# Patient Record
Sex: FEMALE | Race: WHITE | Employment: OTHER | URBAN - NONMETROPOLITAN AREA
[De-identification: names, ages, dates, MRNs, and addresses within clinical notes are randomized per-mention and may not be internally consistent; named-entity substitution may affect disease eponyms.]

---

## 2017-11-16 ENCOUNTER — HOSPITAL ENCOUNTER (OUTPATIENT)
Dept: MAMMOGRAPHY | Age: 62
Discharge: HOME | End: 2017-11-16
Attending: ORTHOPAEDIC SURGERY | Admitting: ORTHOPAEDIC SURGERY

## 2017-11-16 DIAGNOSIS — Z12.31 VISIT FOR SCREENING MAMMOGRAM: ICD-10-CM

## 2020-09-22 ENCOUNTER — HOSPITAL ENCOUNTER (EMERGENCY)
Age: 65
Discharge: ANOTHER HOSPITAL | End: 2020-09-22
Attending: EMERGENCY MEDICINE
Payer: COMMERCIAL

## 2020-09-22 ENCOUNTER — HOSPITAL ENCOUNTER (INPATIENT)
Age: 65
LOS: 7 days | Discharge: NURSING, REHAB OR LONG-TERM CARE | DRG: 481 | End: 2020-09-29
Attending: INTERNAL MEDICINE | Admitting: INTERNAL MEDICINE
Payer: COMMERCIAL

## 2020-09-22 ENCOUNTER — APPOINTMENT (OUTPATIENT)
Dept: GENERAL RADIOLOGY | Age: 65
End: 2020-09-22
Payer: COMMERCIAL

## 2020-09-22 VITALS
OXYGEN SATURATION: 98 % | HEIGHT: 68 IN | WEIGHT: 209 LBS | BODY MASS INDEX: 31.67 KG/M2 | SYSTOLIC BLOOD PRESSURE: 132 MMHG | HEART RATE: 101 BPM | TEMPERATURE: 98.6 F | DIASTOLIC BLOOD PRESSURE: 57 MMHG | RESPIRATION RATE: 13 BRPM

## 2020-09-22 PROBLEM — S72.111K: Status: ACTIVE | Noted: 2020-09-22

## 2020-09-22 LAB
ABO/RH: NORMAL
ANION GAP SERPL CALCULATED.3IONS-SCNC: 17 MMOL/L (ref 4–16)
ANTIBODY SCREEN: NEGATIVE
BACTERIA: ABNORMAL /HPF
BASOPHILS ABSOLUTE: 0 K/CU MM
BASOPHILS RELATIVE PERCENT: 0.4 % (ref 0–1)
BILIRUBIN URINE: NEGATIVE MG/DL
BLOOD, URINE: ABNORMAL
BUN BLDV-MCNC: 17 MG/DL (ref 6–23)
CALCIUM SERPL-MCNC: 9.1 MG/DL (ref 8.3–10.6)
CAST TYPE: ABNORMAL /HPF
CHLORIDE BLD-SCNC: 98 MMOL/L (ref 99–110)
CLARITY: CLEAR
CO2: 23 MMOL/L (ref 21–32)
COLOR: YELLOW
CREAT SERPL-MCNC: 0.7 MG/DL (ref 0.6–1.1)
CRYSTAL TYPE: NEGATIVE /HPF
DIFFERENTIAL TYPE: ABNORMAL
EKG ATRIAL RATE: 80 BPM
EKG DIAGNOSIS: NORMAL
EKG P AXIS: 53 DEGREES
EKG P-R INTERVAL: 132 MS
EKG Q-T INTERVAL: 398 MS
EKG QRS DURATION: 96 MS
EKG QTC CALCULATION (BAZETT): 459 MS
EKG R AXIS: 28 DEGREES
EKG T AXIS: 23 DEGREES
EKG VENTRICULAR RATE: 80 BPM
EOSINOPHILS ABSOLUTE: 0.1 K/CU MM
EOSINOPHILS RELATIVE PERCENT: 1.5 % (ref 0–3)
EPITHELIAL CELLS, UA: 3 /HPF
GFR AFRICAN AMERICAN: >60 ML/MIN/1.73M2
GFR NON-AFRICAN AMERICAN: >60 ML/MIN/1.73M2
GLUCOSE BLD-MCNC: 281 MG/DL (ref 70–99)
GLUCOSE BLD-MCNC: 306 MG/DL (ref 70–99)
GLUCOSE, URINE: 500 MG/DL
HCT VFR BLD CALC: 41.5 % (ref 37–47)
HEMOGLOBIN: 13.3 GM/DL (ref 12.5–16)
IMMATURE NEUTROPHIL %: 1.7 % (ref 0–0.43)
INR BLD: 0.94 INDEX
KETONES, URINE: 15 MG/DL
LEUKOCYTE ESTERASE, URINE: NEGATIVE
LYMPHOCYTES ABSOLUTE: 2.4 K/CU MM
LYMPHOCYTES RELATIVE PERCENT: 25.2 % (ref 24–44)
MCH RBC QN AUTO: 29.2 PG (ref 27–31)
MCHC RBC AUTO-ENTMCNC: 32 % (ref 32–36)
MCV RBC AUTO: 91 FL (ref 78–100)
MONOCYTES ABSOLUTE: 0.5 K/CU MM
MONOCYTES RELATIVE PERCENT: 5 % (ref 0–4)
NITRITE URINE, QUANTITATIVE: NEGATIVE
PDW BLD-RTO: 13 % (ref 11.7–14.9)
PH, URINE: 5.5 (ref 5–8)
PLATELET # BLD: 269 K/CU MM (ref 140–440)
PMV BLD AUTO: 10.4 FL (ref 7.5–11.1)
POTASSIUM SERPL-SCNC: 3.9 MMOL/L (ref 3.5–5.1)
PROTEIN UA: ABNORMAL MG/DL
PROTHROMBIN TIME: 10.7 SECONDS (ref 11.7–14.5)
RBC # BLD: 4.56 M/CU MM (ref 4.2–5.4)
RBC URINE: 1 /HPF (ref 0–6)
SEGMENTED NEUTROPHILS ABSOLUTE COUNT: 6.4 K/CU MM
SEGMENTED NEUTROPHILS RELATIVE PERCENT: 66.2 % (ref 36–66)
SODIUM BLD-SCNC: 138 MMOL/L (ref 135–145)
SPECIFIC GRAVITY UA: >1.03 (ref 1–1.03)
TOTAL IMMATURE NEUTOROPHIL: 0.16 K/CU MM
UROBILINOGEN, URINE: 0.2 MG/DL (ref 0.2–1)
WBC # BLD: 9.7 K/CU MM (ref 4–10.5)
WBC UA: 5 /HPF (ref 0–5)

## 2020-09-22 PROCEDURE — 6360000002 HC RX W HCPCS: Performed by: HOSPITALIST

## 2020-09-22 PROCEDURE — 6360000002 HC RX W HCPCS: Performed by: EMERGENCY MEDICINE

## 2020-09-22 PROCEDURE — 86850 RBC ANTIBODY SCREEN: CPT

## 2020-09-22 PROCEDURE — 87086 URINE CULTURE/COLONY COUNT: CPT

## 2020-09-22 PROCEDURE — 96374 THER/PROPH/DIAG INJ IV PUSH: CPT

## 2020-09-22 PROCEDURE — 2580000003 HC RX 258: Performed by: NURSE PRACTITIONER

## 2020-09-22 PROCEDURE — 85610 PROTHROMBIN TIME: CPT

## 2020-09-22 PROCEDURE — 86900 BLOOD TYPING SEROLOGIC ABO: CPT

## 2020-09-22 PROCEDURE — 73502 X-RAY EXAM HIP UNI 2-3 VIEWS: CPT

## 2020-09-22 PROCEDURE — 80048 BASIC METABOLIC PNL TOTAL CA: CPT

## 2020-09-22 PROCEDURE — 93010 ELECTROCARDIOGRAM REPORT: CPT | Performed by: INTERNAL MEDICINE

## 2020-09-22 PROCEDURE — 81001 URINALYSIS AUTO W/SCOPE: CPT

## 2020-09-22 PROCEDURE — 86901 BLOOD TYPING SEROLOGIC RH(D): CPT

## 2020-09-22 PROCEDURE — 85025 COMPLETE CBC W/AUTO DIFF WBC: CPT

## 2020-09-22 PROCEDURE — 4500000027

## 2020-09-22 PROCEDURE — 82962 GLUCOSE BLOOD TEST: CPT

## 2020-09-22 PROCEDURE — 99285 EMERGENCY DEPT VISIT HI MDM: CPT

## 2020-09-22 PROCEDURE — 93005 ELECTROCARDIOGRAM TRACING: CPT | Performed by: EMERGENCY MEDICINE

## 2020-09-22 PROCEDURE — 2580000003 HC RX 258: Performed by: HOSPITALIST

## 2020-09-22 PROCEDURE — 6370000000 HC RX 637 (ALT 250 FOR IP): Performed by: HOSPITALIST

## 2020-09-22 PROCEDURE — 96375 TX/PRO/DX INJ NEW DRUG ADDON: CPT

## 2020-09-22 PROCEDURE — 73552 X-RAY EXAM OF FEMUR 2/>: CPT

## 2020-09-22 PROCEDURE — 1200000000 HC SEMI PRIVATE

## 2020-09-22 PROCEDURE — 6360000002 HC RX W HCPCS: Performed by: NURSE PRACTITIONER

## 2020-09-22 RX ORDER — FENTANYL CITRATE 50 UG/ML
200 INJECTION, SOLUTION INTRAMUSCULAR; INTRAVENOUS ONCE
Status: COMPLETED | OUTPATIENT
Start: 2020-09-22 | End: 2020-09-22

## 2020-09-22 RX ORDER — FAMOTIDINE 20 MG/1
20 TABLET, FILM COATED ORAL 2 TIMES DAILY
Status: DISCONTINUED | OUTPATIENT
Start: 2020-09-22 | End: 2020-09-29 | Stop reason: HOSPADM

## 2020-09-22 RX ORDER — PROMETHAZINE HYDROCHLORIDE 25 MG/1
12.5 TABLET ORAL EVERY 6 HOURS PRN
Status: DISCONTINUED | OUTPATIENT
Start: 2020-09-22 | End: 2020-09-29 | Stop reason: HOSPADM

## 2020-09-22 RX ORDER — HYDROMORPHONE HCL 110MG/55ML
2 PATIENT CONTROLLED ANALGESIA SYRINGE INTRAVENOUS ONCE
Status: COMPLETED | OUTPATIENT
Start: 2020-09-22 | End: 2020-09-22

## 2020-09-22 RX ORDER — POTASSIUM CHLORIDE 20 MEQ/1
40 TABLET, EXTENDED RELEASE ORAL PRN
Status: DISCONTINUED | OUTPATIENT
Start: 2020-09-22 | End: 2020-09-29 | Stop reason: HOSPADM

## 2020-09-22 RX ORDER — SODIUM CHLORIDE 0.9 % (FLUSH) 0.9 %
10 SYRINGE (ML) INJECTION EVERY 12 HOURS SCHEDULED
Status: DISCONTINUED | OUTPATIENT
Start: 2020-09-22 | End: 2020-09-23

## 2020-09-22 RX ORDER — ONDANSETRON 2 MG/ML
4 INJECTION INTRAMUSCULAR; INTRAVENOUS EVERY 6 HOURS PRN
Status: DISCONTINUED | OUTPATIENT
Start: 2020-09-22 | End: 2020-09-29 | Stop reason: HOSPADM

## 2020-09-22 RX ORDER — SODIUM CHLORIDE 0.9 % (FLUSH) 0.9 %
10 SYRINGE (ML) INJECTION PRN
Status: DISCONTINUED | OUTPATIENT
Start: 2020-09-22 | End: 2020-09-23

## 2020-09-22 RX ORDER — NICOTINE POLACRILEX 4 MG
15 LOZENGE BUCCAL PRN
Status: DISCONTINUED | OUTPATIENT
Start: 2020-09-22 | End: 2020-09-23 | Stop reason: SDUPTHER

## 2020-09-22 RX ORDER — POLYETHYLENE GLYCOL 3350 17 G/17G
17 POWDER, FOR SOLUTION ORAL DAILY PRN
Status: DISCONTINUED | OUTPATIENT
Start: 2020-09-22 | End: 2020-09-29 | Stop reason: HOSPADM

## 2020-09-22 RX ORDER — LOSARTAN POTASSIUM 25 MG/1
50 TABLET ORAL 2 TIMES DAILY
Status: DISCONTINUED | OUTPATIENT
Start: 2020-09-22 | End: 2020-09-29 | Stop reason: HOSPADM

## 2020-09-22 RX ORDER — MORPHINE SULFATE 4 MG/ML
4 INJECTION, SOLUTION INTRAMUSCULAR; INTRAVENOUS
Status: DISCONTINUED | OUTPATIENT
Start: 2020-09-22 | End: 2020-09-22

## 2020-09-22 RX ORDER — DEXTROSE MONOHYDRATE 50 MG/ML
100 INJECTION, SOLUTION INTRAVENOUS PRN
Status: DISCONTINUED | OUTPATIENT
Start: 2020-09-22 | End: 2020-09-29 | Stop reason: HOSPADM

## 2020-09-22 RX ORDER — MORPHINE SULFATE 2 MG/ML
2 INJECTION, SOLUTION INTRAMUSCULAR; INTRAVENOUS
Status: DISCONTINUED | OUTPATIENT
Start: 2020-09-22 | End: 2020-09-22

## 2020-09-22 RX ORDER — POTASSIUM CHLORIDE 7.45 MG/ML
10 INJECTION INTRAVENOUS PRN
Status: DISCONTINUED | OUTPATIENT
Start: 2020-09-22 | End: 2020-09-29 | Stop reason: HOSPADM

## 2020-09-22 RX ORDER — DEXTROSE MONOHYDRATE 25 G/50ML
12.5 INJECTION, SOLUTION INTRAVENOUS PRN
Status: DISCONTINUED | OUTPATIENT
Start: 2020-09-22 | End: 2020-09-23 | Stop reason: SDUPTHER

## 2020-09-22 RX ORDER — ONDANSETRON 2 MG/ML
4 INJECTION INTRAMUSCULAR; INTRAVENOUS ONCE
Status: COMPLETED | OUTPATIENT
Start: 2020-09-22 | End: 2020-09-22

## 2020-09-22 RX ORDER — LOSARTAN POTASSIUM 50 MG/1
50 TABLET ORAL 2 TIMES DAILY
COMMUNITY

## 2020-09-22 RX ORDER — SODIUM CHLORIDE 9 MG/ML
INJECTION, SOLUTION INTRAVENOUS CONTINUOUS
Status: ACTIVE | OUTPATIENT
Start: 2020-09-23 | End: 2020-09-23

## 2020-09-22 RX ORDER — MAGNESIUM SULFATE IN WATER 40 MG/ML
2 INJECTION, SOLUTION INTRAVENOUS PRN
Status: DISCONTINUED | OUTPATIENT
Start: 2020-09-22 | End: 2020-09-29 | Stop reason: HOSPADM

## 2020-09-22 RX ADMIN — SODIUM CHLORIDE: 9 INJECTION, SOLUTION INTRAVENOUS at 22:26

## 2020-09-22 RX ADMIN — HYDROMORPHONE HYDROCHLORIDE 0.5 MG: 1 INJECTION, SOLUTION INTRAMUSCULAR; INTRAVENOUS; SUBCUTANEOUS at 22:25

## 2020-09-22 RX ADMIN — ENOXAPARIN SODIUM 40 MG: 40 INJECTION SUBCUTANEOUS at 21:31

## 2020-09-22 RX ADMIN — HYDROMORPHONE HYDROCHLORIDE 2 MG: 2 INJECTION, SOLUTION INTRAMUSCULAR; INTRAVENOUS; SUBCUTANEOUS at 14:03

## 2020-09-22 RX ADMIN — HYDROMORPHONE HYDROCHLORIDE 2 MG: 2 INJECTION, SOLUTION INTRAMUSCULAR; INTRAVENOUS; SUBCUTANEOUS at 17:38

## 2020-09-22 RX ADMIN — FAMOTIDINE 20 MG: 20 TABLET ORAL at 21:30

## 2020-09-22 RX ADMIN — SODIUM CHLORIDE, PRESERVATIVE FREE 10 ML: 5 INJECTION INTRAVENOUS at 21:31

## 2020-09-22 RX ADMIN — LOSARTAN POTASSIUM 50 MG: 25 TABLET, FILM COATED ORAL at 21:30

## 2020-09-22 RX ADMIN — ONDANSETRON 4 MG: 2 INJECTION INTRAMUSCULAR; INTRAVENOUS at 11:59

## 2020-09-22 RX ADMIN — FENTANYL CITRATE 200 MCG: 50 INJECTION INTRAMUSCULAR; INTRAVENOUS at 11:59

## 2020-09-22 ASSESSMENT — ENCOUNTER SYMPTOMS
COLOR CHANGE: 0
COUGH: 0
ABDOMINAL PAIN: 0
SORE THROAT: 0
VOMITING: 0
CONSTIPATION: 0
SHORTNESS OF BREATH: 0
EYE DISCHARGE: 0
DIARRHEA: 0
RHINORRHEA: 0
EYE ITCHING: 0

## 2020-09-22 ASSESSMENT — PAIN SCALES - GENERAL
PAINLEVEL_OUTOF10: 10
PAINLEVEL_OUTOF10: 8
PAINLEVEL_OUTOF10: 10

## 2020-09-22 ASSESSMENT — PAIN DESCRIPTION - DESCRIPTORS: DESCRIPTORS: THROBBING;ACHING

## 2020-09-22 ASSESSMENT — PAIN DESCRIPTION - ORIENTATION: ORIENTATION: RIGHT

## 2020-09-22 ASSESSMENT — PAIN DESCRIPTION - LOCATION: LOCATION: LEG

## 2020-09-22 ASSESSMENT — PAIN DESCRIPTION - FREQUENCY: FREQUENCY: CONTINUOUS

## 2020-09-22 NOTE — ED NOTES
Called access center to have [facility] hospitalist paged for consult      Kathleen Chance  09/22/20 1257

## 2020-09-22 NOTE — H&P
[facility] History & Physical      PCP: Stefan Crigler, DO    Date of Admission: 9/22/2020    of Service: Pt seen/examined on 09/22/20 and Admitted to Inpatient    Hx taken from patient    Chief Complaint: Right leg pain status post fall  History Of Present Illness: The patient is a 76 y.o. female PMHx HTN, DM who was transferred from [facility]. Patient was in bathroom around noon and had gotten out of shower and slipped on a wet spot. She fell back and hit her right leg against door frame. Has pain in the entire right leg. Does not radiate. Aching pain. Sharp pain when moves. Moving makes worse. Laying still helps. She is not able to get up from the floor. She had her cell phone on her and called her family and had the squad come get her. Never broken a bone before. Does not think she hit her head. Did not lose consciousness. No lightheadedness or dizziness. No chest pain or palpitations. No shortness of breath. No fever. She is not on any blood thinners. In the ER patient was found to have a laterally displaced right distal femur fracture. She was given pain medication. Patient requested to be seen by Dr. Henry Enter the orthopedic surgeon, who she is familiar with. He was notified in the ER. Past Medical History:        Diagnosis Date    Arthritis     Diabetes mellitus ([address]     Hypertension        PastSurgical History:        Procedure Laterality Date    CHOLECYSTECTOMY  1998       Medications Prior to Admission:    Prior to Admission medications    Medication Sig Start Date End Date Taking? Authorizing Provider   losartan (COZAAR) 50 MG tablet Take 50 mg by mouth 2 times daily   Yes Historical Provider, MD   metFORMIN (GLUCOPHAGE) 500 MG tablet Take 500 mg by mouth 2 times daily (with meals)   Yes Historical Provider, MD   GLIPIZIDE PO Take 10 mg by mouth daily    Yes Historical Provider, MD       Allergies:    Dristan spray [nasal spray] and Entex la    Social History:     The patient currently lives at home at independent living. TOBACCO:   reports that she has never smoked. She has never used smokeless tobacco.  ETOH:   reports current alcohol use. History:  Positive as follows:        Problem Relation Age of Onset    Diabetes Mother     Diabetes Sister        REVIEW OF SYSTEMS:   Pertinent positives and negatives as noted in the HPI and ROS. All other systems reviewed and negative. Review of Systems   Constitutional: Negative for fever. HENT: Negative for rhinorrhea and sore throat. Eyes: Negative for discharge and itching. Respiratory: Negative for cough and shortness of breath. Cardiovascular: Positive for leg swelling. Negative for chest pain and palpitations. Gastrointestinal: Negative for abdominal pain, constipation, diarrhea and vomiting. Genitourinary: Negative for dysuria and hematuria. Musculoskeletal:        Chronic arthritis  Right hip and leg pain       Skin: Negative for color change and pallor. Neurological: Negative for dizziness, light-headedness and headaches. Psychiatric/Behavioral: Negative for confusion. The patient is not nervous/anxious. PHYSICAL EXAM:  BP (!) 132/59   Pulse 91   Temp 98.9 °F (37.2 °C) (Oral)   Resp 16   SpO2 96%   Physical Exam  Constitutional:       General: She is not in acute distress. Appearance: She is not ill-appearing, toxic-appearing or diaphoretic. HENT:      Head: Normocephalic and atraumatic. Nose: No rhinorrhea. Mouth/Throat:      Mouth: Mucous membranes are dry. Eyes:      General: No scleral icterus. Right eye: No discharge. Left eye: No discharge. Conjunctiva/sclera: Conjunctivae normal.   Neck:      Musculoskeletal: Normal range of motion and neck supple. No neck rigidity or muscular tenderness. Cardiovascular:      Rate and Rhythm: Normal rate and regular rhythm. Pulses: Normal pulses. Heart sounds: No murmur. No friction rub. No gallop. Pulmonary:      Effort: Pulmonary effort is normal. No respiratory distress. Breath sounds: Normal breath sounds. No stridor. No wheezing, rhonchi or rales. Abdominal:      General: Abdomen is flat. There is no distension. Palpations: Abdomen is soft. There is no mass. Tenderness: There is no abdominal tenderness. There is no guarding or rebound. Musculoskeletal:         General: Swelling (Right leg swelling) and tenderness (Tenderness over the lateral medial aspect of the right femur.) present. Comments: Right lower extremity is externally rotated   Lymphadenopathy:      Cervical: No cervical adenopathy. Skin:     General: Skin is warm and dry. Coloration: Skin is not jaundiced or pale. Findings: No erythema or rash. Neurological:      General: No focal deficit present. Mental Status: She is alert. Mental status is at baseline. Cranial Nerves: No cranial nerve deficit. Psychiatric:         Mood and Affect: Mood normal.         Behavior: Behavior normal.         Thought Content: Thought content normal.         Judgment: Judgment normal.           Imaging:    Xr Femur Right (min 2 Views)    Result Date: 9/22/2020  EXAMINATION: ONE XRAY VIEW OF THE PELVIS AND TWO XRAY VIEWS RIGHT HIP; 3 XRAY VIEWS OF THE RIGHT FEMUR 9/22/2020 12:04 pm COMPARISON: None. HISTORY: ORDERING SYSTEM PROVIDED HISTORY: Fall TECHNOLOGIST PROVIDED HISTORY: Reason for exam:->Fall Reason for Exam: fall Acuity: Acute Type of Exam: Initial Mechanism of Injury: tripped Relevant Medical/Surgical History: distal femur pain and swelling; ORDERING SYSTEM PROVIDED HISTORY: Fall TECHNOLOGIST PROVIDED HISTORY: Reason for exam:->Fall Reason for Exam: fall Acuity: Acute Type of Exam: Initial Mechanism of Injury: tripped Relevant Medical/Surgical History: diatal femur pain and swelling FINDINGS: Oblique laterally displaced fracture distal femur. Otherwise, anatomic alignment. No other fracture identified. that Katia Carias is expected to be hospitalized for >2 midnights based on the following assessment and plan:    ASSESSMENT/PLAN:    1. Laterally displaced fracture of the right distal femur- consult orthopedic surgeon. Patient requesting Dr. Merlin Hippo. Morphine as needed for pain. Neurovascular checks. 2. Hypertension-continue home losartan  3. Diabetes mellitus-hold home meds. Placed on sliding scale insulin.       DVT Prophylaxis: lovenox  Diet: No diet orders on file  Code Status: Full   POA: Daughter Norman Tai MD

## 2020-09-22 NOTE — ED PROVIDER NOTES
Triage Chief Complaint:   Fall (to room per [facility]. States (slipped and fell coming from bathroom striking right leg on door frame))    [place]:  Adela Benitez is a 76 y.o. female that presents to the ED by EMS. Patient slipped getting out of the shower. She hit the door frame 7 0 pain to the right hip right leg. She cannot bear weight. Her knee is slightly flexed. She is laying on back and was transferred by the medics. She denies striking her head. She denies any associated symptoms prior such as lightheadedness and rapid regular heart rate chest pain cough shortness of breath. No associated back pain. No incontinence    Past Medical History:   Diagnosis Date    Arthritis     Hypertension      History reviewed. No pertinent surgical history. History reviewed. No pertinent family history. Social History     Socioeconomic History    Marital status:       Spouse name: Not on file    Number of children: Not on file    Years of education: Not on file    Highest education level: Not on file   Occupational History    Not on file   Social Needs    Financial resource strain: Not on file    Food insecurity     Worry: Not on file     Inability: Not on file    Transportation needs     Medical: Not on file     Non-medical: Not on file   Tobacco Use    Smoking status: Never Smoker    Smokeless tobacco: Never Used   Substance and Sexual Activity    Alcohol use: Yes     Comment: occasionally    Drug use: No    Sexual activity: Not on file   Lifestyle    Physical activity     Days per week: Not on file     Minutes per session: Not on file    Stress: Not on file   Relationships    Social connections     Talks on phone: Not on file     Gets together: Not on file     Attends Tenriism service: Not on file     Active member of club or organization: Not on file     Attends meetings of clubs or organizations: Not on file     Relationship status: Not on file    Intimate partner violence     Fear of current or ex partner: Not on file     Emotionally abused: Not on file     Physically abused: Not on file     Forced sexual activity: Not on file   Other Topics Concern    Not on file   Social History Narrative    Not on file     No current facility-administered medications for this encounter. Current Outpatient Medications   Medication Sig Dispense Refill    losartan (COZAAR) 50 MG tablet Take 50 mg by mouth 2 times daily      metFORMIN (GLUCOPHAGE) 500 MG tablet Take 500 mg by mouth 2 times daily (with meals)      GLIPIZIDE PO Take by mouth daily       Allergies   Allergen Reactions    Dristan Spray [Nasal Spray] Hives    Entex La Rash         ROS:    Review of Systems   Musculoskeletal: Positive for gait problem and joint swelling (R hip ; leg ). All other systems reviewed and are negative. Nursing Notes Reviewed    Physical Exam:  ED Triage Vitals [09/22/20 1123]   Enc Vitals Group      /73      Pulse 92      Resp 16      Temp 98.6 °F (37 °C)      Temp Source Oral      SpO2 100 %      Weight 209 lb (94.8 kg)      Height 5' 8\" (1.727 m)      Head Circumference       Peak Flow       Pain Score       Pain Loc       Pain Edu? Excl. in [address]? Physical Exam  Vitals signs and nursing note reviewed. Exam conducted with a chaperone present. Constitutional:       General: She is in acute distress. Appearance: She is well-developed. She is obese. She is ill-appearing. HENT:      Head: Normocephalic and atraumatic. Right Ear: External ear normal.      Left Ear: External ear normal.   Eyes:      General: No scleral icterus. Right eye: No discharge. Left eye: No discharge. Conjunctiva/sclera: Conjunctivae normal.      Pupils: Pupils are equal, round, and reactive to light. Neck:      Musculoskeletal: Normal range of motion and neck supple. Thyroid: No thyromegaly. Vascular: No JVD. Trachea: No tracheal deviation.    Cardiovascular:      Rate and Rhythm: Normal rate and regular rhythm. Pulses:           Carotid pulses are 2+ on the right side and 2+ on the left side. Radial pulses are 2+ on the right side and 2+ on the left side. Femoral pulses are 2+ on the right side and 2+ on the left side. Popliteal pulses are 2+ on the right side and 2+ on the left side. Dorsalis pedis pulses are 2+ on the right side and 2+ on the left side. Posterior tibial pulses are 2+ on the right side and 2+ on the left side. Heart sounds: Normal heart sounds. No murmur. No friction rub. No gallop. Pulmonary:      Effort: Pulmonary effort is normal. No respiratory distress. Breath sounds: Normal breath sounds. No stridor. No wheezing or rales. Chest:      Chest wall: No tenderness. Abdominal:      General: Bowel sounds are normal. There is no distension. Palpations: Abdomen is soft. There is no mass. Tenderness: There is no abdominal tenderness. There is no guarding or rebound. Hernia: No hernia is present. Musculoskeletal:         General: No deformity. Right hip: She exhibits decreased range of motion, tenderness and bony tenderness. She exhibits normal strength. Right upper leg: She exhibits tenderness, bony tenderness and swelling. Lymphadenopathy:      Cervical: No cervical adenopathy. Skin:     General: Skin is warm and dry. Coloration: Skin is not pale. Findings: No erythema or rash. Neurological:      Mental Status: She is alert and oriented to person, place, and time. Cranial Nerves: No cranial nerve deficit. Sensory: No sensory deficit. Deep Tendon Reflexes: Reflexes are normal and symmetric. Reflexes normal.   Psychiatric:         Speech: Speech normal.         Behavior: Behavior normal.         Thought Content:  Thought content normal.         Judgment: Judgment normal.         I have reviewed and interpreted all of the currently available lab results from this visit (ifapplicable):  Results for orders placed or performed during the hospital encounter of 09/22/20   CBC Auto Differential   Result Value Ref Range    WBC 9.7 4.0 - 10.5 K/CU MM    RBC 4.56 4.2 - 5.4 M/CU MM    Hemoglobin 13.3 12.5 - 16.0 GM/DL    Hematocrit 41.5 37 - 47 %    MCV 91.0 78 - 100 FL    MCH 29.2 27 - 31 PG    MCHC 32.0 32.0 - 36.0 %    RDW 13.0 11.7 - 14.9 %    Platelets 527 381 - 016 K/CU MM    MPV 10.4 7.5 - 11.1 FL    Differential Type AUTOMATED DIFFERENTIAL     Segs Relative 66.2 (H) 36 - 66 %    Lymphocytes % 25.2 24 - 44 %    Monocytes % 5.0 (H) 0 - 4 %    Eosinophils % 1.5 0 - 3 %    Basophils % 0.4 0 - 1 %    Segs Absolute 6.4 K/CU MM    Lymphocytes Absolute 2.4 K/CU MM    Monocytes Absolute 0.5 K/CU MM    Eosinophils Absolute 0.1 K/CU MM    Basophils Absolute 0.0 K/CU MM    Immature Neutrophil % 1.7 (H) 0 - 0.43 %    Total Immature Neutrophil 0.16 K/CU MM   Basic Metabolic Panel w/ Reflex to MG   Result Value Ref Range    Sodium 138 135 - 145 MMOL/L    Potassium 3.9 3.5 - 5.1 MMOL/L    Chloride 98 (L) 99 - 110 mMol/L    CO2 23 21 - 32 MMOL/L    Anion Gap 17 (H) 4 - 16    BUN 17 6 - 23 MG/DL    CREATININE 0.7 0.6 - 1.1 MG/DL    Glucose 306 (H) 70 - 99 MG/DL    Calcium 9.1 8.3 - 10.6 MG/DL    GFR Non-African American >60 >60 mL/min/1.73m2    GFR African American >60 >60 mL/min/1.73m2   PT - INR   Result Value Ref Range    Protime 10.7 (L) 11.7 - 14.5 SECONDS    INR 0.94 INDEX   EKG 12 Lead   Result Value Ref Range    Ventricular Rate 80 BPM    Atrial Rate 80 BPM    P-R Interval 132 ms    QRS Duration 96 ms    Q-T Interval 398 ms    QTc Calculation (Bazett) 459 ms    P Axis 53 degrees    R Axis 28 degrees    T Axis 23 degrees    Diagnosis       Normal sinus rhythm  Normal ECG  No previous ECGs available        Radiographs (if obtained):  [] The following radiograph wasinterpreted by myself in the absence of a radiologist:   [] Radiologist's Report Reviewed:  XR FEMUR RIGHT (MIN 2 VIEWS) Final Result   Oblique laterally displaced fracture distal femur         XR HIP 2-3 VW W PELVIS RIGHT   Final Result   Oblique laterally displaced fracture distal femur               EKG (if obtained): (All EKG's are interpreted by myself in the absence of a cardiologist)          The 12 lead EKG was interpreted by me, and the interpretation is as follows:  normal sinus rhythm, rate = 80. Intervals are within the normal range. QTc is not prolonged. ST elevations are not present. T wave inversions are not present. Non-specific T wave changes are not present. Delta waves, Brugada Syndrome, and Short [place] are not present. There is no acute ischemia. Q waves: 3    Chart review shows recent radiographs:  No results found. MDM:      Presents ED by EMS status post limp and fall she is a midshaft oblique fracture. She was stabilized pain was controlled with IV fentanyl 200 mcg. Patient is requesting Dr. Freida Barrow. He has not operated on this patient but the patient is aware of him and wanted me to attempt to call him first.  Patient is aware of the need for possible transfer. If I do not hear back in a short period of time we will call the on-call assigned orthopedic surgeon. Patient is aware appreciative the update and care      ED Course as of Sep 22 1253   Tue Sep 22, 2020   1253 Discussed with Dr. Freida Barrow. He accepted he states most likely he will do the surgical repair tomorrow    [PW]      ED Course User Index  [PW] Jose Rudolph DO         Clinical Impression:  1. Closed displaced oblique fracture of shaft of right femur, initial encounter ([address]      Disposition referral (if applicable):  No follow-up provider specified. Disposition medications (if applicable):  New Prescriptions    No medications on file           Jefferson Roy DO, FACEP      Comment: Please note this report has been produced using speech recognition software and maycontain errors related to that system including errors in grammar, punctuation, and spelling, as well as words and phrases that may be inappropriate. If there are any questions or concerns please feel free to contact thedictating provider for clarification.         Chad Calderon DO  09/28/20 7112

## 2020-09-23 ENCOUNTER — ANESTHESIA (OUTPATIENT)
Dept: OPERATING ROOM | Age: 65
DRG: 481 | End: 2020-09-23
Payer: COMMERCIAL

## 2020-09-23 ENCOUNTER — APPOINTMENT (OUTPATIENT)
Dept: GENERAL RADIOLOGY | Age: 65
DRG: 481 | End: 2020-09-23
Attending: INTERNAL MEDICINE
Payer: COMMERCIAL

## 2020-09-23 ENCOUNTER — ANESTHESIA EVENT (OUTPATIENT)
Dept: OPERATING ROOM | Age: 65
DRG: 481 | End: 2020-09-23
Payer: COMMERCIAL

## 2020-09-23 VITALS
TEMPERATURE: 98.1 F | OXYGEN SATURATION: 86 % | RESPIRATION RATE: 21 BRPM | SYSTOLIC BLOOD PRESSURE: 123 MMHG | DIASTOLIC BLOOD PRESSURE: 65 MMHG

## 2020-09-23 LAB
ALBUMIN SERPL-MCNC: 3.8 GM/DL (ref 3.4–5)
ALP BLD-CCNC: 80 IU/L (ref 40–128)
ALT SERPL-CCNC: 15 U/L (ref 10–40)
ANION GAP SERPL CALCULATED.3IONS-SCNC: 9 MMOL/L (ref 4–16)
AST SERPL-CCNC: 14 IU/L (ref 15–37)
BILIRUB SERPL-MCNC: 0.6 MG/DL (ref 0–1)
BUN BLDV-MCNC: 16 MG/DL (ref 6–23)
CALCIUM SERPL-MCNC: 8.5 MG/DL (ref 8.3–10.6)
CHLORIDE BLD-SCNC: 101 MMOL/L (ref 99–110)
CO2: 27 MMOL/L (ref 21–32)
CREAT SERPL-MCNC: 0.7 MG/DL (ref 0.6–1.1)
CULTURE: NORMAL
ESTIMATED AVERAGE GLUCOSE: 197 MG/DL
GFR AFRICAN AMERICAN: >60 ML/MIN/1.73M2
GFR NON-AFRICAN AMERICAN: >60 ML/MIN/1.73M2
GLUCOSE BLD-MCNC: 229 MG/DL (ref 70–99)
GLUCOSE BLD-MCNC: 257 MG/DL (ref 70–99)
GLUCOSE BLD-MCNC: 260 MG/DL (ref 70–99)
GLUCOSE BLD-MCNC: 260 MG/DL (ref 70–99)
GLUCOSE BLD-MCNC: 275 MG/DL (ref 70–99)
HBA1C MFR BLD: 8.5 % (ref 4.2–6.3)
HCT VFR BLD CALC: 34.7 % (ref 37–47)
HEMOGLOBIN: 10.6 GM/DL (ref 12.5–16)
Lab: NORMAL
MCH RBC QN AUTO: 28.5 PG (ref 27–31)
MCHC RBC AUTO-ENTMCNC: 30.5 % (ref 32–36)
MCV RBC AUTO: 93.3 FL (ref 78–100)
PDW BLD-RTO: 13.1 % (ref 11.7–14.9)
PLATELET # BLD: 259 K/CU MM (ref 140–440)
PMV BLD AUTO: 10.2 FL (ref 7.5–11.1)
POTASSIUM SERPL-SCNC: 4 MMOL/L (ref 3.5–5.1)
RBC # BLD: 3.72 M/CU MM (ref 4.2–5.4)
SARS-COV-2, NAAT: NOT DETECTED
SODIUM BLD-SCNC: 137 MMOL/L (ref 135–145)
SOURCE: NORMAL
SPECIMEN: NORMAL
TOTAL PROTEIN: 5.8 GM/DL (ref 6.4–8.2)
VITAMIN D 25-HYDROXY: 20.96 NG/ML
WBC # BLD: 8.6 K/CU MM (ref 4–10.5)

## 2020-09-23 PROCEDURE — 3700000001 HC ADD 15 MINUTES (ANESTHESIA): Performed by: ORTHOPAEDIC SURGERY

## 2020-09-23 PROCEDURE — 3700000000 HC ANESTHESIA ATTENDED CARE: Performed by: ORTHOPAEDIC SURGERY

## 2020-09-23 PROCEDURE — 83036 HEMOGLOBIN GLYCOSYLATED A1C: CPT

## 2020-09-23 PROCEDURE — 2500000003 HC RX 250 WO HCPCS: Performed by: NURSE ANESTHETIST, CERTIFIED REGISTERED

## 2020-09-23 PROCEDURE — 94761 N-INVAS EAR/PLS OXIMETRY MLT: CPT

## 2020-09-23 PROCEDURE — 6360000002 HC RX W HCPCS: Performed by: NURSE ANESTHETIST, CERTIFIED REGISTERED

## 2020-09-23 PROCEDURE — 82306 VITAMIN D 25 HYDROXY: CPT

## 2020-09-23 PROCEDURE — 3600000014 HC SURGERY LEVEL 4 ADDTL 15MIN: Performed by: ORTHOPAEDIC SURGERY

## 2020-09-23 PROCEDURE — 6370000000 HC RX 637 (ALT 250 FOR IP): Performed by: HOSPITALIST

## 2020-09-23 PROCEDURE — 2580000003 HC RX 258: Performed by: ORTHOPAEDIC SURGERY

## 2020-09-23 PROCEDURE — C1713 ANCHOR/SCREW BN/BN,TIS/BN: HCPCS | Performed by: ORTHOPAEDIC SURGERY

## 2020-09-23 PROCEDURE — 1200000000 HC SEMI PRIVATE

## 2020-09-23 PROCEDURE — 85027 COMPLETE CBC AUTOMATED: CPT

## 2020-09-23 PROCEDURE — 2500000003 HC RX 250 WO HCPCS

## 2020-09-23 PROCEDURE — 6360000002 HC RX W HCPCS: Performed by: INTERNAL MEDICINE

## 2020-09-23 PROCEDURE — 7100000001 HC PACU RECOVERY - ADDTL 15 MIN: Performed by: ORTHOPAEDIC SURGERY

## 2020-09-23 PROCEDURE — 82962 GLUCOSE BLOOD TEST: CPT

## 2020-09-23 PROCEDURE — P9045 ALBUMIN (HUMAN), 5%, 250 ML: HCPCS | Performed by: NURSE ANESTHETIST, CERTIFIED REGISTERED

## 2020-09-23 PROCEDURE — 88311 DECALCIFY TISSUE: CPT | Performed by: PATHOLOGY

## 2020-09-23 PROCEDURE — U0002 COVID-19 LAB TEST NON-CDC: HCPCS

## 2020-09-23 PROCEDURE — 80053 COMPREHEN METABOLIC PANEL: CPT

## 2020-09-23 PROCEDURE — 88305 TISSUE EXAM BY PATHOLOGIST: CPT | Performed by: PATHOLOGY

## 2020-09-23 PROCEDURE — 7100000000 HC PACU RECOVERY - FIRST 15 MIN: Performed by: ORTHOPAEDIC SURGERY

## 2020-09-23 PROCEDURE — 6360000002 HC RX W HCPCS: Performed by: ORTHOPAEDIC SURGERY

## 2020-09-23 PROCEDURE — C1769 GUIDE WIRE: HCPCS | Performed by: ORTHOPAEDIC SURGERY

## 2020-09-23 PROCEDURE — 88312 SPECIAL STAINS GROUP 1: CPT | Performed by: PATHOLOGY

## 2020-09-23 PROCEDURE — 6370000000 HC RX 637 (ALT 250 FOR IP): Performed by: ORTHOPAEDIC SURGERY

## 2020-09-23 PROCEDURE — 36415 COLL VENOUS BLD VENIPUNCTURE: CPT

## 2020-09-23 PROCEDURE — 0QS806Z REPOSITION RIGHT FEMORAL SHAFT WITH INTRAMEDULLARY INTERNAL FIXATION DEVICE, OPEN APPROACH: ICD-10-PCS | Performed by: UROLOGY

## 2020-09-23 PROCEDURE — 2580000003 HC RX 258: Performed by: NURSE PRACTITIONER

## 2020-09-23 PROCEDURE — 6360000002 HC RX W HCPCS: Performed by: NURSE PRACTITIONER

## 2020-09-23 PROCEDURE — 2709999900 HC NON-CHARGEABLE SUPPLY: Performed by: ORTHOPAEDIC SURGERY

## 2020-09-23 PROCEDURE — 3600000004 HC SURGERY LEVEL 4 BASE: Performed by: ORTHOPAEDIC SURGERY

## 2020-09-23 PROCEDURE — 2780000010 HC IMPLANT OTHER: Performed by: ORTHOPAEDIC SURGERY

## 2020-09-23 PROCEDURE — C9803 HOPD COVID-19 SPEC COLLECT: HCPCS

## 2020-09-23 PROCEDURE — 2580000003 HC RX 258: Performed by: NURSE ANESTHETIST, CERTIFIED REGISTERED

## 2020-09-23 PROCEDURE — 64450 NJX AA&/STRD OTHER PN/BRANCH: CPT

## 2020-09-23 PROCEDURE — 76000 FLUOROSCOPY <1 HR PHYS/QHP: CPT

## 2020-09-23 PROCEDURE — 2720000010 HC SURG SUPPLY STERILE: Performed by: ORTHOPAEDIC SURGERY

## 2020-09-23 DEVICE — SCREW BNE L42MM DIA5MM TIB LT GRN TI ST CANN LOK FULL THRD: Type: IMPLANTABLE DEVICE | Status: FUNCTIONAL

## 2020-09-23 DEVICE — SCREW BNE L46MM DIA5MM LAT FEM LT GRN TI ST LOK FULL THRD: Type: IMPLANTABLE DEVICE | Status: FUNCTIONAL

## 2020-09-23 DEVICE — SCREW BNE L58MM DIA5MM ST TIB LT GRN TI ST CANN LOK FULL: Type: IMPLANTABLE DEVICE | Site: FEMUR | Status: FUNCTIONAL

## 2020-09-23 DEVICE — IMPLANTABLE DEVICE: Type: IMPLANTABLE DEVICE | Status: FUNCTIONAL

## 2020-09-23 RX ORDER — ONDANSETRON 2 MG/ML
INJECTION INTRAMUSCULAR; INTRAVENOUS PRN
Status: DISCONTINUED | OUTPATIENT
Start: 2020-09-23 | End: 2020-09-23 | Stop reason: SDUPTHER

## 2020-09-23 RX ORDER — MIDAZOLAM HYDROCHLORIDE 1 MG/ML
INJECTION INTRAMUSCULAR; INTRAVENOUS PRN
Status: DISCONTINUED | OUTPATIENT
Start: 2020-09-23 | End: 2020-09-23 | Stop reason: SDUPTHER

## 2020-09-23 RX ORDER — ONDANSETRON 2 MG/ML
8 INJECTION INTRAMUSCULAR; INTRAVENOUS
Status: DISCONTINUED | OUTPATIENT
Start: 2020-09-23 | End: 2020-09-23 | Stop reason: HOSPADM

## 2020-09-23 RX ORDER — ROPIVACAINE HYDROCHLORIDE 5 MG/ML
INJECTION, SOLUTION EPIDURAL; INFILTRATION; PERINEURAL
Status: COMPLETED | OUTPATIENT
Start: 2020-09-23 | End: 2020-09-23

## 2020-09-23 RX ORDER — OXYCODONE HYDROCHLORIDE 5 MG/1
5 TABLET ORAL EVERY 4 HOURS PRN
Status: DISCONTINUED | OUTPATIENT
Start: 2020-09-23 | End: 2020-09-27

## 2020-09-23 RX ORDER — FENTANYL CITRATE 50 UG/ML
INJECTION, SOLUTION INTRAMUSCULAR; INTRAVENOUS PRN
Status: DISCONTINUED | OUTPATIENT
Start: 2020-09-23 | End: 2020-09-23 | Stop reason: SDUPTHER

## 2020-09-23 RX ORDER — LIDOCAINE HYDROCHLORIDE 20 MG/ML
INJECTION, SOLUTION INTRAVENOUS PRN
Status: DISCONTINUED | OUTPATIENT
Start: 2020-09-23 | End: 2020-09-23 | Stop reason: SDUPTHER

## 2020-09-23 RX ORDER — SODIUM CHLORIDE 0.9 % (FLUSH) 0.9 %
10 SYRINGE (ML) INJECTION EVERY 12 HOURS SCHEDULED
Status: DISCONTINUED | OUTPATIENT
Start: 2020-09-23 | End: 2020-09-29 | Stop reason: HOSPADM

## 2020-09-23 RX ORDER — SODIUM CHLORIDE, SODIUM LACTATE, POTASSIUM CHLORIDE, CALCIUM CHLORIDE 600; 310; 30; 20 MG/100ML; MG/100ML; MG/100ML; MG/100ML
INJECTION, SOLUTION INTRAVENOUS CONTINUOUS PRN
Status: DISCONTINUED | OUTPATIENT
Start: 2020-09-23 | End: 2020-09-23 | Stop reason: SDUPTHER

## 2020-09-23 RX ORDER — ALBUMIN, HUMAN INJ 5% 5 %
SOLUTION INTRAVENOUS PRN
Status: DISCONTINUED | OUTPATIENT
Start: 2020-09-23 | End: 2020-09-23 | Stop reason: SDUPTHER

## 2020-09-23 RX ORDER — DEXTROSE, SODIUM CHLORIDE, AND POTASSIUM CHLORIDE 5; .45; .15 G/100ML; G/100ML; G/100ML
INJECTION INTRAVENOUS CONTINUOUS
Status: DISCONTINUED | OUTPATIENT
Start: 2020-09-23 | End: 2020-09-24

## 2020-09-23 RX ORDER — SODIUM CHLORIDE, SODIUM LACTATE, POTASSIUM CHLORIDE, CALCIUM CHLORIDE 600; 310; 30; 20 MG/100ML; MG/100ML; MG/100ML; MG/100ML
INJECTION, SOLUTION INTRAVENOUS CONTINUOUS
Status: DISCONTINUED | OUTPATIENT
Start: 2020-09-23 | End: 2020-09-25

## 2020-09-23 RX ORDER — ACETAMINOPHEN 325 MG/1
650 TABLET ORAL EVERY 6 HOURS
Status: DISCONTINUED | OUTPATIENT
Start: 2020-09-23 | End: 2020-09-29 | Stop reason: HOSPADM

## 2020-09-23 RX ORDER — DEXTROSE MONOHYDRATE 50 MG/ML
100 INJECTION, SOLUTION INTRAVENOUS PRN
Status: DISCONTINUED | OUTPATIENT
Start: 2020-09-23 | End: 2020-09-29 | Stop reason: HOSPADM

## 2020-09-23 RX ORDER — OXYCODONE HYDROCHLORIDE 10 MG/1
10 TABLET ORAL EVERY 4 HOURS PRN
Status: DISCONTINUED | OUTPATIENT
Start: 2020-09-23 | End: 2020-09-27

## 2020-09-23 RX ORDER — DEXTROSE MONOHYDRATE 25 G/50ML
12.5 INJECTION, SOLUTION INTRAVENOUS PRN
Status: DISCONTINUED | OUTPATIENT
Start: 2020-09-23 | End: 2020-09-29 | Stop reason: HOSPADM

## 2020-09-23 RX ORDER — LABETALOL HYDROCHLORIDE 5 MG/ML
5 INJECTION, SOLUTION INTRAVENOUS EVERY 10 MIN PRN
Status: DISCONTINUED | OUTPATIENT
Start: 2020-09-23 | End: 2020-09-23 | Stop reason: HOSPADM

## 2020-09-23 RX ORDER — TRANEXAMIC ACID 100 MG/ML
INJECTION, SOLUTION INTRAVENOUS PRN
Status: DISCONTINUED | OUTPATIENT
Start: 2020-09-23 | End: 2020-09-23 | Stop reason: SDUPTHER

## 2020-09-23 RX ORDER — ROCURONIUM BROMIDE 10 MG/ML
INJECTION, SOLUTION INTRAVENOUS PRN
Status: DISCONTINUED | OUTPATIENT
Start: 2020-09-23 | End: 2020-09-23 | Stop reason: SDUPTHER

## 2020-09-23 RX ORDER — GLIPIZIDE 5 MG/1
10 TABLET ORAL DAILY
Status: DISCONTINUED | OUTPATIENT
Start: 2020-09-24 | End: 2020-09-29 | Stop reason: HOSPADM

## 2020-09-23 RX ORDER — SODIUM CHLORIDE 0.9 % (FLUSH) 0.9 %
10 SYRINGE (ML) INJECTION PRN
Status: DISCONTINUED | OUTPATIENT
Start: 2020-09-23 | End: 2020-09-29 | Stop reason: HOSPADM

## 2020-09-23 RX ORDER — NICOTINE POLACRILEX 4 MG
15 LOZENGE BUCCAL PRN
Status: DISCONTINUED | OUTPATIENT
Start: 2020-09-23 | End: 2020-09-29 | Stop reason: HOSPADM

## 2020-09-23 RX ORDER — METOCLOPRAMIDE HYDROCHLORIDE 5 MG/ML
5 INJECTION INTRAMUSCULAR; INTRAVENOUS
Status: DISCONTINUED | OUTPATIENT
Start: 2020-09-23 | End: 2020-09-23 | Stop reason: HOSPADM

## 2020-09-23 RX ORDER — FENTANYL CITRATE 50 UG/ML
25 INJECTION, SOLUTION INTRAMUSCULAR; INTRAVENOUS EVERY 5 MIN PRN
Status: DISCONTINUED | OUTPATIENT
Start: 2020-09-23 | End: 2020-09-23 | Stop reason: HOSPADM

## 2020-09-23 RX ORDER — SENNA AND DOCUSATE SODIUM 50; 8.6 MG/1; MG/1
1 TABLET, FILM COATED ORAL 2 TIMES DAILY
Status: DISCONTINUED | OUTPATIENT
Start: 2020-09-23 | End: 2020-09-29 | Stop reason: HOSPADM

## 2020-09-23 RX ORDER — KETAMINE HYDROCHLORIDE 10 MG/ML
INJECTION, SOLUTION INTRAMUSCULAR; INTRAVENOUS PRN
Status: DISCONTINUED | OUTPATIENT
Start: 2020-09-23 | End: 2020-09-23 | Stop reason: SDUPTHER

## 2020-09-23 RX ORDER — HYDROMORPHONE HCL 110MG/55ML
0.5 PATIENT CONTROLLED ANALGESIA SYRINGE INTRAVENOUS EVERY 5 MIN PRN
Status: DISCONTINUED | OUTPATIENT
Start: 2020-09-23 | End: 2020-09-23 | Stop reason: HOSPADM

## 2020-09-23 RX ORDER — PROPOFOL 10 MG/ML
INJECTION, EMULSION INTRAVENOUS PRN
Status: DISCONTINUED | OUTPATIENT
Start: 2020-09-23 | End: 2020-09-23 | Stop reason: SDUPTHER

## 2020-09-23 RX ADMIN — SODIUM CHLORIDE, POTASSIUM CHLORIDE, SODIUM LACTATE AND CALCIUM CHLORIDE: 600; 310; 30; 20 INJECTION, SOLUTION INTRAVENOUS at 12:33

## 2020-09-23 RX ADMIN — FENTANYL CITRATE 25 MCG: 50 INJECTION INTRAMUSCULAR; INTRAVENOUS at 10:47

## 2020-09-23 RX ADMIN — KETAMINE HYDROCHLORIDE 20 MG: 10 INJECTION INTRAMUSCULAR; INTRAVENOUS at 10:35

## 2020-09-23 RX ADMIN — HYDROMORPHONE HYDROCHLORIDE 0.5 MG: 1 INJECTION, SOLUTION INTRAMUSCULAR; INTRAVENOUS; SUBCUTANEOUS at 16:06

## 2020-09-23 RX ADMIN — ROCURONIUM BROMIDE 10 MG: 10 INJECTION INTRAVENOUS at 12:03

## 2020-09-23 RX ADMIN — HYDROMORPHONE HYDROCHLORIDE 0.5 MG: 1 INJECTION, SOLUTION INTRAMUSCULAR; INTRAVENOUS; SUBCUTANEOUS at 20:52

## 2020-09-23 RX ADMIN — PHENYLEPHRINE HYDROCHLORIDE 100 MCG: 10 INJECTION INTRAVENOUS at 11:29

## 2020-09-23 RX ADMIN — SODIUM CHLORIDE, POTASSIUM CHLORIDE, SODIUM LACTATE AND CALCIUM CHLORIDE: 600; 310; 30; 20 INJECTION, SOLUTION INTRAVENOUS at 11:54

## 2020-09-23 RX ADMIN — CEFAZOLIN SODIUM 2 G: 10 INJECTION, POWDER, FOR SOLUTION INTRAVENOUS at 10:44

## 2020-09-23 RX ADMIN — ACETAMINOPHEN 650 MG: 325 TABLET ORAL at 20:45

## 2020-09-23 RX ADMIN — PHENYLEPHRINE HYDROCHLORIDE 100 MCG: 10 INJECTION INTRAVENOUS at 12:11

## 2020-09-23 RX ADMIN — HYDROMORPHONE HYDROCHLORIDE 0.5 MG: 1 INJECTION, SOLUTION INTRAMUSCULAR; INTRAVENOUS; SUBCUTANEOUS at 08:41

## 2020-09-23 RX ADMIN — FAMOTIDINE 20 MG: 20 TABLET ORAL at 20:44

## 2020-09-23 RX ADMIN — KETAMINE HYDROCHLORIDE 10 MG: 10 INJECTION INTRAMUSCULAR; INTRAVENOUS at 12:08

## 2020-09-23 RX ADMIN — ROCURONIUM BROMIDE 10 MG: 10 INJECTION INTRAVENOUS at 12:42

## 2020-09-23 RX ADMIN — LOSARTAN POTASSIUM 50 MG: 25 TABLET, FILM COATED ORAL at 20:45

## 2020-09-23 RX ADMIN — SODIUM CHLORIDE: 9 INJECTION, SOLUTION INTRAVENOUS at 03:51

## 2020-09-23 RX ADMIN — INSULIN LISPRO 6 UNITS: 100 INJECTION, SOLUTION INTRAVENOUS; SUBCUTANEOUS at 17:06

## 2020-09-23 RX ADMIN — ALBUMIN (HUMAN) 12.5 G: 12.5 INJECTION, SOLUTION INTRAVENOUS at 11:13

## 2020-09-23 RX ADMIN — MIDAZOLAM 2 MG: 1 INJECTION INTRAMUSCULAR; INTRAVENOUS at 10:20

## 2020-09-23 RX ADMIN — PHENYLEPHRINE HYDROCHLORIDE 100 MCG: 10 INJECTION INTRAVENOUS at 11:00

## 2020-09-23 RX ADMIN — PROPOFOL 140 MG: 10 INJECTION, EMULSION INTRAVENOUS at 10:35

## 2020-09-23 RX ADMIN — CEFAZOLIN SODIUM 2 G: 10 INJECTION, POWDER, FOR SOLUTION INTRAVENOUS at 17:59

## 2020-09-23 RX ADMIN — TRANEXAMIC ACID 1000 MG: 100 INJECTION, SOLUTION INTRAVENOUS at 10:54

## 2020-09-23 RX ADMIN — LIDOCAINE HYDROCHLORIDE 100 MG: 20 INJECTION, SOLUTION INTRAVENOUS at 10:35

## 2020-09-23 RX ADMIN — ACETAMINOPHEN 650 MG: 325 TABLET ORAL at 17:08

## 2020-09-23 RX ADMIN — SODIUM CHLORIDE, POTASSIUM CHLORIDE, SODIUM LACTATE AND CALCIUM CHLORIDE: 600; 310; 30; 20 INJECTION, SOLUTION INTRAVENOUS at 14:43

## 2020-09-23 RX ADMIN — FENTANYL CITRATE 25 MCG: 50 INJECTION INTRAMUSCULAR; INTRAVENOUS at 11:04

## 2020-09-23 RX ADMIN — SUGAMMADEX 150 MG: 100 INJECTION, SOLUTION INTRAVENOUS at 13:56

## 2020-09-23 RX ADMIN — FENTANYL CITRATE 25 MCG: 50 INJECTION INTRAMUSCULAR; INTRAVENOUS at 11:15

## 2020-09-23 RX ADMIN — ROCURONIUM BROMIDE 20 MG: 10 INJECTION INTRAVENOUS at 11:04

## 2020-09-23 RX ADMIN — PHENYLEPHRINE HYDROCHLORIDE 100 MCG: 10 INJECTION INTRAVENOUS at 11:09

## 2020-09-23 RX ADMIN — ROCURONIUM BROMIDE 10 MG: 10 INJECTION INTRAVENOUS at 12:58

## 2020-09-23 RX ADMIN — SUGAMMADEX 200 MG: 100 INJECTION, SOLUTION INTRAVENOUS at 13:52

## 2020-09-23 RX ADMIN — ROCURONIUM BROMIDE 50 MG: 10 INJECTION INTRAVENOUS at 10:35

## 2020-09-23 RX ADMIN — OXYCODONE HYDROCHLORIDE 10 MG: 10 TABLET ORAL at 17:08

## 2020-09-23 RX ADMIN — ROCURONIUM BROMIDE 10 MG: 10 INJECTION INTRAVENOUS at 11:35

## 2020-09-23 RX ADMIN — ONDANSETRON 4 MG: 2 INJECTION INTRAMUSCULAR; INTRAVENOUS at 13:06

## 2020-09-23 RX ADMIN — FENTANYL CITRATE 25 MCG: 50 INJECTION INTRAMUSCULAR; INTRAVENOUS at 11:06

## 2020-09-23 RX ADMIN — ROCURONIUM BROMIDE 20 MG: 10 INJECTION INTRAVENOUS at 11:14

## 2020-09-23 RX ADMIN — DOCUSATE SODIUM 50MG AND SENNOSIDES 8.6MG 1 TABLET: 8.6; 5 TABLET, FILM COATED ORAL at 20:44

## 2020-09-23 RX ADMIN — SODIUM CHLORIDE, POTASSIUM CHLORIDE, SODIUM LACTATE AND CALCIUM CHLORIDE: 600; 310; 30; 20 INJECTION, SOLUTION INTRAVENOUS at 10:29

## 2020-09-23 RX ADMIN — ROPIVACAINE HYDROCHLORIDE 30 ML: 5 INJECTION, SOLUTION EPIDURAL; INFILTRATION; PERINEURAL at 10:59

## 2020-09-23 RX ADMIN — HYDROMORPHONE HYDROCHLORIDE 0.5 MG: 1 INJECTION, SOLUTION INTRAMUSCULAR; INTRAVENOUS; SUBCUTANEOUS at 03:50

## 2020-09-23 ASSESSMENT — PULMONARY FUNCTION TESTS
PIF_VALUE: 27
PIF_VALUE: 19
PIF_VALUE: 1
PIF_VALUE: 6
PIF_VALUE: 29
PIF_VALUE: 30
PIF_VALUE: 3
PIF_VALUE: 23
PIF_VALUE: 24
PIF_VALUE: 23
PIF_VALUE: 26
PIF_VALUE: 23
PIF_VALUE: 6
PIF_VALUE: 29
PIF_VALUE: 25
PIF_VALUE: 28
PIF_VALUE: 27
PIF_VALUE: 6
PIF_VALUE: 23
PIF_VALUE: 22
PIF_VALUE: 25
PIF_VALUE: 23
PIF_VALUE: 8
PIF_VALUE: 28
PIF_VALUE: 17
PIF_VALUE: 25
PIF_VALUE: 28
PIF_VALUE: 23
PIF_VALUE: 20
PIF_VALUE: 23
PIF_VALUE: 28
PIF_VALUE: 28
PIF_VALUE: 23
PIF_VALUE: 25
PIF_VALUE: 18
PIF_VALUE: 29
PIF_VALUE: 27
PIF_VALUE: 25
PIF_VALUE: 27
PIF_VALUE: 27
PIF_VALUE: 0
PIF_VALUE: 7
PIF_VALUE: 29
PIF_VALUE: 28
PIF_VALUE: 23
PIF_VALUE: 17
PIF_VALUE: 29
PIF_VALUE: 20
PIF_VALUE: 26
PIF_VALUE: 23
PIF_VALUE: 28
PIF_VALUE: 27
PIF_VALUE: 28
PIF_VALUE: 21
PIF_VALUE: 25
PIF_VALUE: 24
PIF_VALUE: 26
PIF_VALUE: 23
PIF_VALUE: 21
PIF_VALUE: 22
PIF_VALUE: 29
PIF_VALUE: 23
PIF_VALUE: 29
PIF_VALUE: 23
PIF_VALUE: 23
PIF_VALUE: 27
PIF_VALUE: 25
PIF_VALUE: 23
PIF_VALUE: 1
PIF_VALUE: 21
PIF_VALUE: 27
PIF_VALUE: 21
PIF_VALUE: 23
PIF_VALUE: 28
PIF_VALUE: 28
PIF_VALUE: 27
PIF_VALUE: 27
PIF_VALUE: 28
PIF_VALUE: 26
PIF_VALUE: 27
PIF_VALUE: 25
PIF_VALUE: 23
PIF_VALUE: 28
PIF_VALUE: 19
PIF_VALUE: 23
PIF_VALUE: 27
PIF_VALUE: 27
PIF_VALUE: 24
PIF_VALUE: 28
PIF_VALUE: 28
PIF_VALUE: 23
PIF_VALUE: 1
PIF_VALUE: 29
PIF_VALUE: 27
PIF_VALUE: 23
PIF_VALUE: 23
PIF_VALUE: 19
PIF_VALUE: 27
PIF_VALUE: 23
PIF_VALUE: 23
PIF_VALUE: 3
PIF_VALUE: 22
PIF_VALUE: 23
PIF_VALUE: 27
PIF_VALUE: 28
PIF_VALUE: 24
PIF_VALUE: 28
PIF_VALUE: 12
PIF_VALUE: 25
PIF_VALUE: 25
PIF_VALUE: 1
PIF_VALUE: 25
PIF_VALUE: 22
PIF_VALUE: 29
PIF_VALUE: 22
PIF_VALUE: 1
PIF_VALUE: 28
PIF_VALUE: 27
PIF_VALUE: 29
PIF_VALUE: 23
PIF_VALUE: 21
PIF_VALUE: 27
PIF_VALUE: 28
PIF_VALUE: 28
PIF_VALUE: 29
PIF_VALUE: 20
PIF_VALUE: 28
PIF_VALUE: 25
PIF_VALUE: 29
PIF_VALUE: 25
PIF_VALUE: 23
PIF_VALUE: 27
PIF_VALUE: 23
PIF_VALUE: 28
PIF_VALUE: 1
PIF_VALUE: 23
PIF_VALUE: 0
PIF_VALUE: 27
PIF_VALUE: 24
PIF_VALUE: 27
PIF_VALUE: 27
PIF_VALUE: 25
PIF_VALUE: 25
PIF_VALUE: 28
PIF_VALUE: 30
PIF_VALUE: 23
PIF_VALUE: 28
PIF_VALUE: 23
PIF_VALUE: 23
PIF_VALUE: 25
PIF_VALUE: 29
PIF_VALUE: 27
PIF_VALUE: 23
PIF_VALUE: 23
PIF_VALUE: 17
PIF_VALUE: 18
PIF_VALUE: 26
PIF_VALUE: 28
PIF_VALUE: 23
PIF_VALUE: 32
PIF_VALUE: 24
PIF_VALUE: 27
PIF_VALUE: 27
PIF_VALUE: 23
PIF_VALUE: 28
PIF_VALUE: 24
PIF_VALUE: 23
PIF_VALUE: 23
PIF_VALUE: 28
PIF_VALUE: 26
PIF_VALUE: 30
PIF_VALUE: 27
PIF_VALUE: 25
PIF_VALUE: 28
PIF_VALUE: 27
PIF_VALUE: 29
PIF_VALUE: 27
PIF_VALUE: 27
PIF_VALUE: 28
PIF_VALUE: 17
PIF_VALUE: 29
PIF_VALUE: 23
PIF_VALUE: 23
PIF_VALUE: 27
PIF_VALUE: 27
PIF_VALUE: 20
PIF_VALUE: 2
PIF_VALUE: 29
PIF_VALUE: 23
PIF_VALUE: 21
PIF_VALUE: 28
PIF_VALUE: 22
PIF_VALUE: 27
PIF_VALUE: 23
PIF_VALUE: 23
PIF_VALUE: 29
PIF_VALUE: 23
PIF_VALUE: 27
PIF_VALUE: 27
PIF_VALUE: 28

## 2020-09-23 ASSESSMENT — PAIN SCALES - GENERAL
PAINLEVEL_OUTOF10: 8
PAINLEVEL_OUTOF10: 0
PAINLEVEL_OUTOF10: 7
PAINLEVEL_OUTOF10: 8
PAINLEVEL_OUTOF10: 7
PAINLEVEL_OUTOF10: 10
PAINLEVEL_OUTOF10: 0
PAINLEVEL_OUTOF10: 7
PAINLEVEL_OUTOF10: 7
PAINLEVEL_OUTOF10: 8
PAINLEVEL_OUTOF10: 8
PAINLEVEL_OUTOF10: 10

## 2020-09-23 ASSESSMENT — PAIN DESCRIPTION - ORIENTATION: ORIENTATION: RIGHT

## 2020-09-23 ASSESSMENT — PAIN DESCRIPTION - LOCATION: LOCATION: LEG

## 2020-09-23 NOTE — PROGRESS NOTES
Hospitalist Progress Note      Name:  John Kerr /Age/Sex: 1955  (76 y.o. female)   MRN & CSN:  [id number] & [id number] Admission Date/Time: 2020  6:41 PM   Location:  OR/NONE PCP: Tobias Day: 2    Assessment and Plan:   John Kerr is a 76 y.o.  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 Femoral fracture, right: Status post open reduction internal fixation. · Pain control, diet per surgery  · Physical therapy and Occupational Therapy  · DVT prophylaxis. 2. Hypertension: Continue losartan. 3.   4. Diabetes: Sliding scale hypoglycemia protocol. Hemoglobin A1c 8.2. Resume glipizide. 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 Status post open reduction internal fixation. Pain scale 6/10. No chest pain or shortness of breath. Ten point ROS reviewed negative, unless as noted above    Objective: Intake/Output Summary (Last 24 hours) at 2020 1448  Last data filed at 2020 1400  Gross per 24 hour   Intake 2510 ml   Output 1250 ml   Net 1260 ml      Vitals:   Vitals:    20 1435   BP: 134/88   Pulse: 94   Resp: 16   Temp:    SpO2: 97%     Physical Exam:   GEN Awake female, sitting upright in bed in no apparent distress. Appears given age. EYES Pupils are equally round. No scleral erythema, discharge, or conjunctivitis. HENT Mucous membranes are moist. Oral pharynx without exudates, no evidence of thrush. NECK Supple, no apparent thyromegaly or masses. RESP Clear to auscultation, no wheezes, rales or rhonchi.   Symmetric chest movement while on room air. CARDIO/VASC S1/S2 auscultated. Regular rate without appreciable murmurs, rubs, or gallops. No JVD or carotid bruits. Peripheral pulses equal bilaterally and palpable. No peripheral edema. GI Abdomen is soft without significant tenderness, masses, or guarding. Bowel sounds are normoactive. Rectal exam deferred. MSK No gross joint deformities. Right thigh with swelling. SKIN Normal coloration, warm, dry. NEURO Cranial nerves appear grossly intact, normal speech, no lateralizing weakness. PSYCH Awake, alert, oriented x 4. Affect appropriate.     Medications:   Medications:    sodium chloride flush  10 mL Intravenous 2 times per day    enoxaparin  40 mg Subcutaneous Daily    famotidine  20 mg Oral BID    insulin lispro  0-12 Units Subcutaneous TID WC    insulin lispro  0-6 Units Subcutaneous Nightly    losartan  50 mg Oral BID      Infusions:    dextrose 5% and 0.45% NaCl with KCl 20 mEq      lactated ringers 100 mL/hr at 09/23/20 1443    dextrose       PRN Meds: labetalol, 5 mg, Q10 Min PRN  ondansetron, 8 mg, Once PRN  metoclopramide, 5 mg, Once PRN  HYDROmorphone, 0.5 mg, Q5 Min PRN  fentanNYL, 25 mcg, Q5 Min PRN  sodium chloride flush, 10 mL, PRN  polyethylene glycol, 17 g, Daily PRN  promethazine, 12.5 mg, Q6H PRN    Or  ondansetron, 4 mg, Q6H PRN  potassium chloride, 40 mEq, PRN    Or  potassium alternative oral replacement, 40 mEq, PRN    Or  potassium chloride, 10 mEq, PRN  magnesium sulfate, 2 g, PRN  glucose, 15 g, PRN  dextrose, 12.5 g, PRN  glucagon (rDNA), 1 mg, PRN  dextrose, 100 mL/hr, PRN  HYDROmorphone, 0.5 mg, Q4H PRN      Recent Labs     09/22/20  1140 09/23/20  0551   WBC 9.7 8.6   HGB 13.3 10.6*   HCT 41.5 34.7*    259      Recent Labs     09/22/20  1140 09/23/20  0551    137   K 3.9 4.0   CL 98* 101   CO2 23 27   BUN 17 16   CREATININE 0.7 0.7     Recent Labs     09/23/20  0551   AST 14*   ALT 15   BILITOT 0.6   ALKPHOS 80     Recent Labs     09/22/20  1140   INR 0.94     Imaging reviewed    Electronically signed by Paula Lou MD on 9/23/2020 at 2:48 PM

## 2020-09-23 NOTE — PROGRESS NOTES
1405- pt rec'd from the OR and placed on pacu monitor with alarms on. Report rec'd from Calvin LECHUGA and OR nurse. Pt arousing and following commands. Shivering noted. Warm blankets applied. resps even and unlabored. RLE dressing dry and intact. Right upper thigh/ hip area dressing has moderate amount sero-sang drainage present. Pt able to wiggle all right toes and flex and dorsiflex with right foot. Pt denies pain. 1445- right upper thigh sterile dressing change done due to drainage. Pt turned and repositioned. Pt tolerated well. Denies any pain. 1513- pt denies any pain. VSS. Pt transferred back to room 1125 via bed without incident.

## 2020-09-23 NOTE — ANESTHESIA PROCEDURE NOTES
Peripheral Block    Patient location during procedure: PACU  Start time: 9/23/2020 10:10 AM  End time: 9/23/2020 10:20 AM  Staffing  Anesthesiologist: Flower Puentes MD  Resident/CRNA: ELLIE Nesbitt CRNA  Performed: resident/CRNA   Preanesthetic Checklist  Completed: patient identified, site marked, surgical consent, pre-op evaluation, timeout performed, IV checked, risks and benefits discussed, monitors and equipment checked, anesthesia consent given, oxygen available and patient being monitored  Peripheral Block  Patient position: supine  Prep: ChloraPrep  Patient monitoring: cardiac monitor, continuous pulse ox, continuous capnometry, frequent blood pressure checks and IV access  Block type: Fascia iliaca  Laterality: right  Injection technique: single-shot  Procedures: ultrasound guided  Local infiltration: lidocaine  Infiltration strength: 1 %  Dose: 1 mL  Provider prep: mask  Local infiltration: lidocaine  Needle  Needle type: combined needle/nerve stimulator   Needle gauge: 22 G  Needle length: 11 cm  Assessment  Injection assessment: negative aspiration for heme, no paresthesia on injection and local visualized surrounding nerve on ultrasound  Paresthesia pain: none  Slow fractionated injection: yes  Hemodynamics: stable  Medications Administered  Ropivacaine (NAROPIN) injection 0.5%, 30 mL  Reason for block: procedure for pain, post-op pain management and at surgeon's request

## 2020-09-23 NOTE — PROGRESS NOTES
Call initiated by: Nursing staff:  Tony Titus  Call addressed around: 9/22/2020 10:05 PM      Reason for call: Patient requesting Dilaudid instead of morphine states that was when she was getting in [facility].   Patient states she does not want to try morphine      Orders placed: Switch to [address] [place] [address], ELLIE - CNP

## 2020-09-23 NOTE — CONSULTS
symptoms    Physical Exam:    Vitals: /61   Pulse 88   Temp 98.7 °F (37.1 °C) (Oral)   Resp 18   Ht 5' 7.99\" (1.727 m)   Wt 208 lb 15.9 oz (94.8 kg)   SpO2 93%   BMI 31.79 kg/m² ,  Body mass index is 31.79 kg/m². General appearance: alert, appears stated age and cooperative  Skin: Skin color, texture, turgor normal. No rashes or lesions  HEENT: Head: Normocephalic, no lesions, without obvious abnormality. Extremities:    Right femur is tender to palpation. Obvious deformity is present around the leg, femur   Toes with some swelling, sensation intact to light touch   No ankle deformity or tenderness   Good capillary refill toes   Difficult to asses motor function due to pain and apprehension due to fracture   Can wiggle toes   No tenderness over bilateral wrist, elbow or shoulders. .    Neurologic: Mental status: Alert, oriented, thought content appropriate    Labs     CBC:   Recent Labs     09/22/20  1140   WBC 9.7   HGB 13.3        BMP:    Recent Labs     09/22/20  1140      K 3.9   CL 98*   CO2 23   BUN 17   CREATININE 0.7   GLUCOSE 306*     INR:   Lab Results   Component Value Date    INR 0.94 09/22/2020    PROTIME 10.7 (L) 09/22/2020         X-ray view   Imaging Review.   X-rays were personally reviewed by myself  Narrative    EXAMINATION:    ONE XRAY VIEW OF THE PELVIS AND TWO XRAY VIEWS RIGHT HIP; 3 XRAY VIEWS OF THE    RIGHT FEMUR         9/22/2020 12:04 pm         COMPARISON:    None.         HISTORY:    ORDERING SYSTEM PROVIDED HISTORY: Fall    TECHNOLOGIST PROVIDED HISTORY:    Reason for exam:->Fall    Reason for Exam: fall    Acuity: Acute    Type of Exam: Initial    Mechanism of Injury: tripped    Relevant Medical/Surgical History: distal femur pain and swelling; ORDERING    SYSTEM PROVIDED HISTORY: Fall    TECHNOLOGIST PROVIDED HISTORY:    Reason for exam:->Fall    Reason for Exam: fall    Acuity: Acute    Type of Exam: Initial    Mechanism of Injury: tripped    Relevant Medical/Surgical History: diatal femur pain and swelling         FINDINGS:    Oblique laterally displaced fracture distal femur.  Otherwise, anatomic    alignment.  No other fracture identified.              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 Right Displaced Femur Fracture    We reviewed the X-rays with the patient and thoughtfully discussed operative vs nonoperative treatment for the femur fracture. The X-Rays demonstrate displacement and would benefit from ORIF. We thoughtfully considered closed management with the outcome possibly being deformity about the knee, loss of motion, continued pain and likely posttraumatic arthritis. We discussed non-surgical treatment which would include clast immobilization and non weightbearing. Based on the fracture pattern I am recommending operative fixation to improve alignment and fracture position to help restore anatomy and stability. The goal of surgical management is to promote early mobilization, maintaining limb length and alignment, and preserving the soft-tissue envelope with a durable fixation that allows functional recovery during bone healing. Risks of the procedure include but not limited to infection, residual instability, anesthetic risks, deep venous clot, pulmonary embolism, bleeding, persistent pain, persistent weakness, loss of reduction, residual stiffness, postoperative stoke and heart attack and possibly death. The patient will receive post-operative antibiotics will be given both chemo (Lovenox) and mechanical (SIDNEY juan mes and SCD's) DVT prophylaxis post-operatively. The need for revision surgery is always a distinct possibility.  The patient will be given detailed information of the postoperative recovery and restrictions. All questions were answered and the patient understands the risks and wished to proceed with surgery.       Kena Dumas MD

## 2020-09-23 NOTE — ANESTHESIA PRE PROCEDURE
Department of Anesthesiology  Preprocedure Note       Name:  Nick Godoy   Age:  76 y.o.  :  1955                                          MRN:  [id number]         Date:  2020      Surgeon: Samaria Grayson):  Fani Nix MD    Procedure: Procedure(s):  RIGHT FEMUR IM NAIL AMAYA INSERTION    Medications prior to admission:   Prior to Admission medications    Medication Sig Start Date End Date Taking?  Authorizing Provider   losartan (COZAAR) 50 MG tablet Take 50 mg by mouth 2 times daily   Yes Historical Provider, MD   metFORMIN (GLUCOPHAGE) 500 MG tablet Take 500 mg by mouth 2 times daily (with meals)   Yes Historical Provider, MD   GLIPIZIDE PO Take 10 mg by mouth daily    Yes Historical Provider, MD       Current medications:    Current Facility-Administered Medications   Medication Dose Route Frequency Provider Last Rate Last Dose    sodium chloride flush 0.9 % injection 10 mL  10 mL Intravenous 2 times per day Phong Day MD   10 mL at 20    sodium chloride flush 0.9 % injection 10 mL  10 mL Intravenous PRN Phong Day MD        polyethylene glycol (GLYCOLAX) packet 17 g  17 g Oral Daily PRN Phong Day MD        promethazine (PHENERGAN) tablet 12.5 mg  12.5 mg Oral Q6H PRN Phong Day MD        Or    ondansetron [facility]) injection 4 mg  4 mg Intravenous Q6H PRN Phong Day MD        enoxaparin (LOVENOX) injection 40 mg  40 mg Subcutaneous Daily Phong Day MD   40 mg at 20    potassium chloride (KLOR-CON M) extended release tablet 40 mEq  40 mEq Oral PRN Phong Day MD        Or   [facility] potassium bicarb-citric acid (EFFER-K) effervescent tablet 40 mEq  40 mEq Oral PRN Phong Day MD        Or   [facility] potassium chloride 10 mEq/100 mL IVPB (Peripheral Line)  10 mEq Intravenous PRN Phong Day MD        magnesium sulfate 2 g in 50 mL IVPB premix  2 g Intravenous PRN Phong Day MD        famotidine (PEPCID) tablet 20 mg  20 mg Oral BID Phong Day MD   20 mg at 09/22/20 2130    insulin lispro (HUMALOG) injection vial 0-12 Units  0-12 Units Subcutaneous TID WC Concepción Frankel MD        insulin lispro (HUMALOG) injection vial 0-6 Units  0-6 Units Subcutaneous Nightly Concepción Frankel MD   3 Units at 09/22/20 2152    glucose (GLUTOSE) 40 % oral gel 15 g  15 g Oral PRN Concepción Frankel MD        dextrose 50 % IV solution  12.5 g Intravenous PRN Concepción Frankel MD        glucagon (rDNA) injection 1 mg  1 mg Intramuscular PRN Concepción Frankel MD        dextrose 5 % solution  100 mL/hr Intravenous PRN Concepción Frankel MD        losartan (COZAAR) tablet 50 mg  50 mg Oral BID Concepción Frankel MD   50 mg at 09/22/20 2130    HYDROmorphone (DILAUDID) injection 0.5 mg  0.5 mg Intravenous Q4H PRN Radha Aj, APRN - CNP   0.5 mg at 09/23/20 0350    0.9 % sodium chloride infusion   Intravenous Continuous Radha Ashleylah, APRN -  mL/hr at 09/23/20 0351         Allergies:     Allergies   Allergen Reactions    Dristan Spray [Nasal Spray] Hives    Entex La Rash       Problem List:    Patient Active Problem List   Diagnosis Code    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       Past Medical History:        Diagnosis Date    Arthritis     Diabetes mellitus ([facility] [address]     Hypertension        Past Surgical History:        Procedure Laterality Date    CHOLECYSTECTOMY  1998       Social History:    Social History     Tobacco Use    Smoking status: Never Smoker    Smokeless tobacco: Never Used   Substance Use Topics    Alcohol use: Yes     Comment: occasionally                                Counseling given: Not Answered      Vital Signs (Current):   Vitals:    09/22/20 1846 09/22/20 2000 09/22/20 2115 09/23/20 0447   BP: (!) 132/59  (!) 142/78 129/61   Pulse: 91  109 88   Resp: 16  16 18   Temp: 37.2 °C (98.9 °F)  36.7 °C (98.1 °F) 37.1 °C (98.7 °F)   TempSrc: Oral  Oral Oral   SpO2: 96%  93% 93%   Weight:  208 lb 15.9 oz (94.8 kg)     Height:  5' 7.99\" (1.727 m)                                                BP Readings from Last 3 Encounters:   09/23/20 129/61   09/22/20 (!) 132/57       NPO Status:                                                                                 BMI:   Wt Readings from Last 3 Encounters:   09/22/20 208 lb 15.9 oz (94.8 kg)   09/22/20 209 lb (94.8 kg)     Body mass index is 31.79 kg/m².     CBC:   Lab Results   Component Value Date    WBC 8.6 09/23/2020    RBC 3.72 09/23/2020    HGB 10.6 09/23/2020    HCT 34.7 09/23/2020    MCV 93.3 09/23/2020    RDW 13.1 09/23/2020     09/23/2020       CMP:   Lab Results   Component Value Date     09/23/2020    K 4.0 09/23/2020     09/23/2020    CO2 27 09/23/2020    BUN 16 09/23/2020    CREATININE 0.7 09/23/2020    GFRAA >60 09/23/2020    LABGLOM >60 09/23/2020    GLUCOSE 229 09/23/2020    PROT 5.8 09/23/2020    CALCIUM 8.5 09/23/2020    BILITOT 0.6 09/23/2020    ALKPHOS 80 09/23/2020    AST 14 09/23/2020    ALT 15 09/23/2020       POC Tests:   Recent Labs     09/23/20  0291   POCGLU 257*       Coags:   Lab Results   Component Value Date    PROTIME 10.7 09/22/2020    INR 0.94 09/22/2020       HCG (If Applicable): No results found for: PREGTESTUR, PREGSERUM, HCG, HCGQUANT     ABGs: No results found for: PHART, PO2ART, YPJ7WCY, GCH3XSU, BEART, B6LZQGVX     Type & Screen (If Applicable):  No results found for: LABABO, LABRH    Drug/Infectious Status (If Applicable):  No results found for: HIV, HEPCAB    COVID-19 Screening (If Applicable): No results found for: COVID19      Anesthesia Evaluation  Patient summary reviewed and Nursing notes reviewed  Airway: Mallampati: II  TM distance: >3 FB   Neck ROM: limited  Mouth opening: > = 3 FB Dental:    (+) upper dentures      Pulmonary:Negative Pulmonary ROS and normal exam                               Cardiovascular:  Exercise tolerance: good (>4 METS),   (+) hypertension:,       NYHA Classification: I  ECG reviewed               Beta Blocker:  Not on Beta Blocker      ROS comment: Normal sinus rhythm   Nonspecific ST abnormality   ? old IWMI   Abnormal ECG   No previous ECGs available   Reconfirmed by [facility] Nickie ZAVALA ([id number]) on 9/22/2020 1:07:27 PM      Neuro/Psych:   Negative Neuro/Psych ROS              GI/Hepatic/Renal: Neg GI/Hepatic/Renal ROS            Endo/Other:    (+) Diabetes, : arthritis:., .                 Abdominal:   (+) obese,         Vascular: negative vascular ROS. Anesthesia Plan      regional, spinal and general     ASA 2     (COVID-19 pending  CBC normal  Glucose 281, rest of BMP normal)  Induction: intravenous. MIPS: Postoperative opioids intended and Prophylactic antiemetics administered. Anesthetic plan and risks discussed with patient and child/children. Plan discussed with CRNA.     Attending anesthesiologist reviewed and agrees with Pre Eval content          ELLIE Hermosillo - CRNA   9/23/2020

## 2020-09-23 NOTE — ANESTHESIA POSTPROCEDURE EVALUATION
Department of Anesthesiology  Postprocedure Note    Patient: Adela Benitez  MRN: [id number]  YOB: 1955  Date of evaluation: 9/23/2020  Time:  2:16 PM     Procedure Summary     Date:  09/23/20 Room / Location:  [address] / [facility]    Anesthesia Start:  8211 Anesthesia Stop:      Procedure:  RIGHT FEMUR IM NAIL AMAYA INSERTION (Right ) Diagnosis:  (RIGHT HIP FRACTURE)    Surgeon:  Danielle Pagan MD Responsible Provider:  ELLIE Morales CRNA    Anesthesia Type:  regional, spinal, general ASA Status:  2          Anesthesia Type: regional, spinal, general    Landen Phase I:      Landen Phase II:      Last vitals: Reviewed and per EMR flowsheets.        Anesthesia Post Evaluation    Patient location during evaluation: PACU  Patient participation: complete - patient participated  Level of consciousness: awake and alert  Pain score: 0  Airway patency: patent  Nausea & Vomiting: no nausea and no vomiting  Complications: no  Cardiovascular status: hemodynamically stable and blood pressure returned to baseline  Respiratory status: acceptable, spontaneous ventilation, nonlabored ventilation and face mask  Hydration status: stable

## 2020-09-24 ENCOUNTER — APPOINTMENT (OUTPATIENT)
Dept: GENERAL RADIOLOGY | Age: 65
DRG: 481 | End: 2020-09-24
Attending: INTERNAL MEDICINE
Payer: COMMERCIAL

## 2020-09-24 LAB
ANION GAP SERPL CALCULATED.3IONS-SCNC: 9 MMOL/L (ref 4–16)
BASOPHILS ABSOLUTE: 0.1 K/CU MM
BASOPHILS RELATIVE PERCENT: 0.5 % (ref 0–1)
BUN BLDV-MCNC: 15 MG/DL (ref 6–23)
CALCIUM SERPL-MCNC: 7.8 MG/DL (ref 8.3–10.6)
CHLORIDE BLD-SCNC: 102 MMOL/L (ref 99–110)
CO2: 26 MMOL/L (ref 21–32)
CREAT SERPL-MCNC: 0.8 MG/DL (ref 0.6–1.1)
DIFFERENTIAL TYPE: ABNORMAL
EOSINOPHILS ABSOLUTE: 0.1 K/CU MM
EOSINOPHILS RELATIVE PERCENT: 0.6 % (ref 0–3)
GFR AFRICAN AMERICAN: >60 ML/MIN/1.73M2
GFR NON-AFRICAN AMERICAN: >60 ML/MIN/1.73M2
GLUCOSE BLD-MCNC: 189 MG/DL (ref 70–99)
GLUCOSE BLD-MCNC: 224 MG/DL (ref 70–99)
GLUCOSE BLD-MCNC: 225 MG/DL (ref 70–99)
GLUCOSE BLD-MCNC: 226 MG/DL (ref 70–99)
GLUCOSE BLD-MCNC: 337 MG/DL (ref 70–99)
HCT VFR BLD CALC: 26.8 % (ref 37–47)
HEMOGLOBIN: 8.2 GM/DL (ref 12.5–16)
IMMATURE NEUTROPHIL %: 0.4 % (ref 0–0.43)
LACTIC ACID, SEPSIS: 1.6 MMOL/L (ref 0.5–1.9)
LYMPHOCYTES ABSOLUTE: 2.1 K/CU MM
LYMPHOCYTES RELATIVE PERCENT: 21.2 % (ref 24–44)
MCH RBC QN AUTO: 29.1 PG (ref 27–31)
MCHC RBC AUTO-ENTMCNC: 30.6 % (ref 32–36)
MCV RBC AUTO: 95 FL (ref 78–100)
MONOCYTES ABSOLUTE: 1 K/CU MM
MONOCYTES RELATIVE PERCENT: 10.2 % (ref 0–4)
NUCLEATED RBC %: 0 %
PDW BLD-RTO: 13.2 % (ref 11.7–14.9)
PLATELET # BLD: 197 K/CU MM (ref 140–440)
PMV BLD AUTO: 10.2 FL (ref 7.5–11.1)
POTASSIUM SERPL-SCNC: 3.9 MMOL/L (ref 3.5–5.1)
RBC # BLD: 2.82 M/CU MM (ref 4.2–5.4)
SEGMENTED NEUTROPHILS ABSOLUTE COUNT: 6.6 K/CU MM
SEGMENTED NEUTROPHILS RELATIVE PERCENT: 67.1 % (ref 36–66)
SODIUM BLD-SCNC: 137 MMOL/L (ref 135–145)
TOTAL IMMATURE NEUTOROPHIL: 0.04 K/CU MM
TOTAL NUCLEATED RBC: 0 K/CU MM
WBC # BLD: 9.8 K/CU MM (ref 4–10.5)

## 2020-09-24 PROCEDURE — 2700000000 HC OXYGEN THERAPY PER DAY

## 2020-09-24 PROCEDURE — 6370000000 HC RX 637 (ALT 250 FOR IP): Performed by: ORTHOPAEDIC SURGERY

## 2020-09-24 PROCEDURE — 97530 THERAPEUTIC ACTIVITIES: CPT

## 2020-09-24 PROCEDURE — 36415 COLL VENOUS BLD VENIPUNCTURE: CPT

## 2020-09-24 PROCEDURE — 6360000002 HC RX W HCPCS: Performed by: ORTHOPAEDIC SURGERY

## 2020-09-24 PROCEDURE — 1200000000 HC SEMI PRIVATE

## 2020-09-24 PROCEDURE — 6370000000 HC RX 637 (ALT 250 FOR IP): Performed by: INTERNAL MEDICINE

## 2020-09-24 PROCEDURE — 85025 COMPLETE CBC W/AUTO DIFF WBC: CPT

## 2020-09-24 PROCEDURE — 87040 BLOOD CULTURE FOR BACTERIA: CPT

## 2020-09-24 PROCEDURE — 94150 VITAL CAPACITY TEST: CPT

## 2020-09-24 PROCEDURE — 94761 N-INVAS EAR/PLS OXIMETRY MLT: CPT

## 2020-09-24 PROCEDURE — 80048 BASIC METABOLIC PNL TOTAL CA: CPT

## 2020-09-24 PROCEDURE — 83605 ASSAY OF LACTIC ACID: CPT

## 2020-09-24 PROCEDURE — 6370000000 HC RX 637 (ALT 250 FOR IP): Performed by: HOSPITALIST

## 2020-09-24 PROCEDURE — 2580000003 HC RX 258: Performed by: ORTHOPAEDIC SURGERY

## 2020-09-24 PROCEDURE — 82962 GLUCOSE BLOOD TEST: CPT

## 2020-09-24 PROCEDURE — 97167 OT EVAL HIGH COMPLEX 60 MIN: CPT

## 2020-09-24 PROCEDURE — 72100 X-RAY EXAM L-S SPINE 2/3 VWS: CPT

## 2020-09-24 PROCEDURE — 6360000002 HC RX W HCPCS: Performed by: NURSE PRACTITIONER

## 2020-09-24 PROCEDURE — 97163 PT EVAL HIGH COMPLEX 45 MIN: CPT

## 2020-09-24 RX ORDER — INSULIN GLARGINE 100 [IU]/ML
10 INJECTION, SOLUTION SUBCUTANEOUS NIGHTLY
Status: DISCONTINUED | OUTPATIENT
Start: 2020-09-24 | End: 2020-09-25

## 2020-09-24 RX ADMIN — INSULIN GLARGINE 10 UNITS: 100 INJECTION, SOLUTION SUBCUTANEOUS at 20:12

## 2020-09-24 RX ADMIN — FAMOTIDINE 20 MG: 20 TABLET ORAL at 09:55

## 2020-09-24 RX ADMIN — SODIUM CHLORIDE, PRESERVATIVE FREE 10 ML: 5 INJECTION INTRAVENOUS at 09:56

## 2020-09-24 RX ADMIN — CEFAZOLIN SODIUM 2 G: 10 INJECTION, POWDER, FOR SOLUTION INTRAVENOUS at 02:27

## 2020-09-24 RX ADMIN — SODIUM CHLORIDE, POTASSIUM CHLORIDE, SODIUM LACTATE AND CALCIUM CHLORIDE: 600; 310; 30; 20 INJECTION, SOLUTION INTRAVENOUS at 23:04

## 2020-09-24 RX ADMIN — LOSARTAN POTASSIUM 50 MG: 25 TABLET, FILM COATED ORAL at 20:19

## 2020-09-24 RX ADMIN — DOCUSATE SODIUM 50MG AND SENNOSIDES 8.6MG 1 TABLET: 8.6; 5 TABLET, FILM COATED ORAL at 20:19

## 2020-09-24 RX ADMIN — ACETAMINOPHEN 650 MG: 325 TABLET ORAL at 22:59

## 2020-09-24 RX ADMIN — ACETAMINOPHEN 650 MG: 325 TABLET ORAL at 09:55

## 2020-09-24 RX ADMIN — OXYCODONE HYDROCHLORIDE 5 MG: 5 TABLET ORAL at 20:19

## 2020-09-24 RX ADMIN — DOCUSATE SODIUM 50MG AND SENNOSIDES 8.6MG 1 TABLET: 8.6; 5 TABLET, FILM COATED ORAL at 09:55

## 2020-09-24 RX ADMIN — ACETAMINOPHEN 650 MG: 325 TABLET ORAL at 03:57

## 2020-09-24 RX ADMIN — ENOXAPARIN SODIUM 40 MG: 40 INJECTION SUBCUTANEOUS at 09:55

## 2020-09-24 RX ADMIN — INSULIN LISPRO 8 UNITS: 100 INJECTION, SOLUTION INTRAVENOUS; SUBCUTANEOUS at 12:19

## 2020-09-24 RX ADMIN — OXYCODONE HYDROCHLORIDE 10 MG: 10 TABLET ORAL at 02:29

## 2020-09-24 RX ADMIN — SODIUM CHLORIDE, POTASSIUM CHLORIDE, SODIUM LACTATE AND CALCIUM CHLORIDE: 600; 310; 30; 20 INJECTION, SOLUTION INTRAVENOUS at 02:26

## 2020-09-24 RX ADMIN — HYDROMORPHONE HYDROCHLORIDE 0.5 MG: 1 INJECTION, SOLUTION INTRAMUSCULAR; INTRAVENOUS; SUBCUTANEOUS at 09:50

## 2020-09-24 RX ADMIN — ACETAMINOPHEN 650 MG: 325 TABLET ORAL at 17:08

## 2020-09-24 RX ADMIN — FAMOTIDINE 20 MG: 20 TABLET ORAL at 20:20

## 2020-09-24 RX ADMIN — HYDROMORPHONE HYDROCHLORIDE 0.5 MG: 1 INJECTION, SOLUTION INTRAMUSCULAR; INTRAVENOUS; SUBCUTANEOUS at 14:34

## 2020-09-24 RX ADMIN — INSULIN LISPRO 4 UNITS: 100 INJECTION, SOLUTION INTRAVENOUS; SUBCUTANEOUS at 17:09

## 2020-09-24 RX ADMIN — LOSARTAN POTASSIUM 50 MG: 25 TABLET, FILM COATED ORAL at 09:55

## 2020-09-24 RX ADMIN — GLIPIZIDE 10 MG: 5 TABLET ORAL at 09:55

## 2020-09-24 RX ADMIN — SODIUM CHLORIDE, POTASSIUM CHLORIDE, SODIUM LACTATE AND CALCIUM CHLORIDE: 600; 310; 30; 20 INJECTION, SOLUTION INTRAVENOUS at 13:12

## 2020-09-24 ASSESSMENT — PAIN DESCRIPTION - FREQUENCY
FREQUENCY: CONTINUOUS
FREQUENCY: CONTINUOUS
FREQUENCY: INTERMITTENT

## 2020-09-24 ASSESSMENT — PAIN DESCRIPTION - DESCRIPTORS
DESCRIPTORS: ACHING;DISCOMFORT
DESCRIPTORS: ACHING
DESCRIPTORS: DISCOMFORT;PRESSURE;SHARP;SORE
DESCRIPTORS: CRAMPING

## 2020-09-24 ASSESSMENT — PAIN DESCRIPTION - ONSET
ONSET: ON-GOING
ONSET: ON-GOING
ONSET: PROGRESSIVE

## 2020-09-24 ASSESSMENT — PAIN SCALES - GENERAL
PAINLEVEL_OUTOF10: 4
PAINLEVEL_OUTOF10: 6
PAINLEVEL_OUTOF10: 7
PAINLEVEL_OUTOF10: 3
PAINLEVEL_OUTOF10: 8
PAINLEVEL_OUTOF10: 10
PAINLEVEL_OUTOF10: 8
PAINLEVEL_OUTOF10: 10
PAINLEVEL_OUTOF10: 7
PAINLEVEL_OUTOF10: 5
PAINLEVEL_OUTOF10: 6
PAINLEVEL_OUTOF10: 5
PAINLEVEL_OUTOF10: 4

## 2020-09-24 ASSESSMENT — PAIN DESCRIPTION - PAIN TYPE
TYPE: SURGICAL PAIN
TYPE: ACUTE PAIN;SURGICAL PAIN
TYPE: SURGICAL PAIN

## 2020-09-24 ASSESSMENT — PAIN DESCRIPTION - LOCATION
LOCATION: LEG
LOCATION: HIP
LOCATION: BACK
LOCATION: BACK

## 2020-09-24 ASSESSMENT — PAIN DESCRIPTION - PROGRESSION
CLINICAL_PROGRESSION: GRADUALLY WORSENING
CLINICAL_PROGRESSION: GRADUALLY WORSENING

## 2020-09-24 ASSESSMENT — PAIN DESCRIPTION - ORIENTATION
ORIENTATION: RIGHT

## 2020-09-24 NOTE — PROGRESS NOTES
Physical Therapy  [facility] ACUTE CARE PHYSICAL THERAPY EVALUATION  Lolly Colón, 1955, 1125/1125-A, 9/24/2020    History  [place]:  There were no encounter diagnoses. Patient  has a past medical history of Arthritis, Diabetes mellitus ([address] and Hypertension. Patient  has a past surgical history that includes Cholecystectomy (1998). Subjective:  Patient states: \"My back and hip are hurting me. I need my pain meds\"  Pain:  8/10 back and right hip   Communication with other providers:   RN, co-eval with OT, Achilles Chidi   Restrictions: TTWB RLE, falls, bed exit, portable tele,BP cuff, O2     Home Setup/Prior level of function  Social/Functional History  Lives With: Alone  Type of Home: Apartment  Home Layout: One level  Home Access: Level entry  [employer] Equipment: Grab bars in shower  Bathroom Accessibility: Accessible  Receives Help From: Family  ADL Assistance: Independent  Homemaking Assistance: Independent  Homemaking Responsibilities: Yes  Ambulation Assistance: Independent  Transfer Assistance: Independent  Active : Yes  Occupation: Retired    Examination of body systems (includes body structures/functions, activity/participation limitations):  · Observation:  Supine in bed upon arrival. Pt in pain but cooperative with therapy. Dtr visiting. Overall limited session due to significant pain. Right hip positioned in IR and resistant to positioning in neutral 2* pain. · [facility]  [place]   · Hearing:  [facility]   · Cardiopulmonary: stable vitals throughout session. · Orientation: [facility]     Musculoskeletal  · ROM R/L:  Right hip limited with ER and flexion. Able to achieve neutral with PROM and 90deg of hip flexion in sitting. Right knee ~0-45deg. Required pillow under foot to prop due to significant pain with knee flexion. Right ankle WFL. Left LLE WFL though painful with hip flexion past 90 deg. · Strength R/L:  RLE grossly 2-/5, LLE grossly 3/5.  Significant strength deficits observed in function and endurance. Mobility/treatment:   · Rolling L/R:  Partial roll maxA x 2 to the right. · Supine to sit:  maxA x 2-3 person for BLE guidance and full trunk support to limit pain and protect spine/hip. Kept HOB elevated. Very slow pace. Very slow transition. · Sit to supine: maxA x 3 person for bilat LE and full trunk support. Kept HOB elevated. Very slow transition   · Transfers: Not safe to attempt at this time. · Sitting balance:  X ~5 minutes EOB varied from total trunk support initially to CGA. BUE support   · Standing balance:  Not safe to attempt at this time   · Gait: Not safe to attempt at this time   · Educated pt on POC, role of PT, TTWB precautions, technique to protect spine. Cues for sequencing to inc safety and indep with mobility     [facility] 6 Clicks Inpatient Mobility:  AM-PAC Inpatient Mobility Raw Score : 9    Safety: patient left in bed, call light within reach, RN notified    Assessment:  Pt is a 76year old female admitted after a fall sustaining a right femur fx. She is s/p right hip ORIF 9/23. Recommend subacute rehab once medically stable. At baseline she is indep with gross mobility and ADLs. She is currently functioning well below her typical baseline and would benefit from continued therapy to address her current deficits, dec potential fall risk, and restore function. Complexity: high   Prognosis: Good/fair  Plan Times per week:  Pt continues to have severe pain to her back. Imagining of lumbar spine is non acute but thoracic spine needs further investigated. Hold until all spine imagining completed and cleared by physician for continuing mobility.    Discharge Recommendations: Subacute/Skilled Nursing Facility  Equipment: continue to assess     Goals:  Short term goals  Time Frame for Short term goals: 1 week  Short term goal 1: Pt will perform rolling Yosvany  Short term goal 2: Pt will transition sit><supine maxA  Short term goal 3: Pt will sit and perform light dynamic activity x 5 minutes SBA, single UE support  Short term goal 4: Pt will transfer between surfaces maxA x 2 while maintainin TTWB RLE       Treatment plan:  Bed mobility, transfers, balance, gait, TA, TX, WC     Time:   Time in: 3697  Time out: 0945  Timed treatment minutes: 10  Total time: 27    Electronically signed by:    Pam Sullivan [id number]  9/24/2020, 11:28 AM

## 2020-09-24 NOTE — PROGRESS NOTES
mobility:   Supine to sit: DEP  Sit to supine: DE    Sitting balance: ModA eventually progresses to close SBA but with pt uses heavy BUE support. Poor posture due pt pt trying to avoid pain. No true trunk weakness    Transfers: unable    Standing balance: unable    Ambulation:  unable     Activity tolerance  Poor due to significant pain    Assessment:  Assessment  Performance deficits / Impairments: Decreased functional mobility , Decreased ADL status, Decreased strength, Decreased safe awareness, Decreased balance, Decreased high-level IADLs  Treatment Diagnosis: R IMN and rodafter fall c hi pfracture  Prognosis: Good  Decision Making: High Complexity  REQUIRES OT FOLLOW UP: Yes  Discharge Recommendations: [address]    Pt is a 76year old F admitted from home after falling in the shower from which she sustained a R displaced femoral shaft fracture. She underwent surgical repair (IMN and Saji) on 9/23/20. She continues to have back pain but imaging of L-spine has proved non-acute. T-spine imaging pending. Will await further therapy until T-spine further assessed and subsequently treated per physician. Pt will need SNF once medically clear. Goals:  By d/c or goals met:     Pt will perform all bed mobility with ModAx2 in prep for EOB/OOB activity. Pt will perform all functional transfers with ModAx2 and appropriate use of LRD to bed, toilet, chair in prep for increased functional independence. Pt will perform UB ADLs with ModA to increase functional independence. Pt will perform LB ADLs with MaxA to increase functional independence. Pt will perform all aspects of toileting with MaxA to increase functional independence. Pt will participate in therex/therax c emphasis on strength, activity tolerance,  safety, RANI tasks.     Plan:  Plan  Times per week: 2x      Recommendation for activity with nursing staff:  Continue to assess    Treatment today:      Therapeutic Activity Training: Therapeutic activity training was instructed today. Cues were given for safety, sequence, UE/LE placement, visual cues, and balance. Activities performed today included bed mobility training, sup-sit, sitting, sit to supine  Education: Role of OT, OT POC, d/c needs, home safety    Safety: Left in bed with all needs in reach. Gait belt used for transfer and mobility. Time in:  0915  Time out:  0945  Timed treatment minutes:  15  Total treatment time:  30    Electronically signed by:    KAT Olivia/L, [address] [place] [address]   [id number]   12:51 PM, 9/24/2020

## 2020-09-24 NOTE — CARE COORDINATION
Met c pt and dghtr at bedside to initiate discharge planning. Pt lives at home alone and prior to admit was ind with ADLs. Discussed the need for therapy before returning home. Provided with SNF list and they chose [facility] Swing bed. Referral sent to [facility] admissions for Swing bed.

## 2020-09-24 NOTE — OP NOTE
[address] [place] [address], [address] [place] [address]                                OPERATIVE REPORT    PATIENT NAME: Genesis Long                     :        1955  MED REC NO:   [id number]                          ROOM:       [id number]  ACCOUNT NO:   [de-identified]                           ADMIT DATE: 2020  PROVIDER:     Savage Mireles MD    DATE OF PROCEDURE:  2020    PREOPERATIVE DIAGNOSIS:  Right displaced femoral shaft fracture. POSTOPERATIVE DIAGNOSIS:  Right displaced femoral shaft fracture. PROCEDURE PERFORMED:  Intramedullary nail fixation, right femur. SURGEON:  Savage Gleason. Rao Mireles MD    ASSISTANT:  None. ANESTHESIA:  General endotracheal with regional block. ESTIMATED BLOOD LOSS:  400 mL. COMPLICATIONS:  None. SPECIMENS:  A 1.5 x 1.5 cm calcified nodule removed for surgical  pathology. DRAINS:  None. SIGNIFICANT FINDINGS:  Highly comminuted displaced femoral shaft  fracture. INDICATIONS FOR PROCEDURE:  The patient is a 60-year-old female who  slipped and fell landing on to her right leg. She presented to [facility] where x-rays showed a displaced shortened femoral shaft  fracture. Based on the amount of displacement, we discussed treatment  options. The risks and benefits of operative intervention as outlined  in the office notes. DESCRIPTION OF PROCEDURE:  The patient was brought back to the operating  room and placed supine on the operating room table. Once under general  endotracheal anesthesia and preoperative regional block, the patient was  then placed in the fracture table. Longitudinal traction was applied;  however, fracture was not able to be reduced due to likely the initial  amount of displacement and soft tissue in position. Therefore, decision  was made for open reduction. This was done after the right lower  extremity was prepped and draped in the usual manner. with  0-Vicryl stitch. This was done both proximally and distally. We then  closed subcutaneous tissue with 2-0 Vicryl stitch and the skin was  closed with staples. Sterile dressing was applied and the patient was  brought back to the recovery room in stable condition.     Signed but not read, IMT      Leslie Russell MD    D: 09/23/2020 13:40:30       T: 09/23/2020 15:03:19     [email or web address]  Job#: [id number]     Doc#: [id number]    CC:

## 2020-09-24 NOTE — PROGRESS NOTES
This nurse was called by the aide because the patient's O2 sat was in the 70's and had Temp of 101.9. The patient was on room air due to weaning off oxygen from surgery on 9/23/2020. This nurse applied her O2 at 3L per NC and sat her up in bed. Her sat increased to 95%. Patient currently on 2L NC. Patient has scheduled Tylenol which was given for the fever. Dr. Nii Sanches notified. Blood cultures ordered. Patient's Temp at 1845 was 99.0.

## 2020-09-24 NOTE — PROGRESS NOTES
Hospitalist Progress Note      Name:  Jean Cornejo /Age/Sex: 1955  (76 y.o. female)   MRN & CSN:  [id number] & [id number] Admission Date/Time: 2020  6:41 PM   Location:  [address] [place] [address] PCP: Tobias Day: 3    Assessment and Plan:   Jean Cornejo is a 76 y.o.  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 Femoral fracture, right: Status post open reduction internal fixation. · Pain control, diet per surgery  · Physical therapy and Occupational Therapy  · DVT prophylaxis. 2. Low Back Pain: could still be related to the fall  · Lumbar x ray pending    3. Hypertension: Continue losartan. 4. Acute blood loss anemia: Hemoglobin 8.2. No active bleeding. Will monitor. 5. Diabetes: Sliding scale hypoglycemia protocol. Hemoglobin A1c 8.2. Resume glipizide. 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 Pain is well controlled. But complaining of low back pain. No weakness on both legs. No numbness. Appetite fair. Ten point ROS reviewed negative, unless as noted above    Objective:        Intake/Output Summary (Last 24 hours) at 2020 0933  Last data filed at 2020 9626  Gross per 24 hour   Intake 3210 ml   Output 1550 ml   Net 1660 ml      Vitals:   Vitals:    20 0747   BP: (!) 102/50   Pulse:    Resp:    Temp:    SpO2:      Physical Exam:   GEN Awake female, sitting upright in bed in no apparent distress. Appears given age. EYES Pupils are equally round. No scleral erythema, discharge, or conjunctivitis. HENT Mucous membranes are moist. Oral pharynx without exudates, no evidence of thrush. NECK Supple, no apparent thyromegaly or masses. RESP Clear to auscultation, no wheezes, rales or rhonchi. Symmetric chest movement while on room air. CARDIO/VASC S1/S2 auscultated. Regular rate without appreciable murmurs, rubs, or gallops. No JVD or carotid bruits. Peripheral pulses equal bilaterally and palpable. No peripheral edema. GI Abdomen is soft without significant tenderness, masses, or guarding. Bowel sounds are normoactive. Rectal exam deferred. MSK No gross joint deformities. Right thigh with swelling. SKIN Normal coloration, warm, dry. NEURO Cranial nerves appear grossly intact, normal speech, no lateralizing weakness. No sensory deficit. PSYCH Awake, alert, oriented x 4. Affect appropriate.     Medications:   Medications:    insulin glargine  10 Units Subcutaneous Nightly    insulin lispro  5 Units Subcutaneous TID WC    sodium chloride flush  10 mL Intravenous 2 times per day    sennosides-docusate sodium  1 tablet Oral BID    acetaminophen  650 mg Oral Q6H    enoxaparin  40 mg Subcutaneous Daily    glipiZIDE  10 mg Oral Daily    famotidine  20 mg Oral BID    insulin lispro  0-12 Units Subcutaneous TID WC    insulin lispro  0-6 Units Subcutaneous Nightly    losartan  50 mg Oral BID      Infusions:    lactated ringers 100 mL/hr at 09/24/20 0226    dextrose      dextrose       PRN Meds: sodium chloride flush, 10 mL, PRN  HYDROmorphone, 0.25 mg, Q3H PRN    Or  HYDROmorphone, 0.5 mg, Q3H PRN  magnesium hydroxide, 30 mL, Daily PRN  oxyCODONE, 5 mg, Q4H PRN    Or  oxyCODONE, 10 mg, Q4H PRN  glucose, 15 g, PRN  dextrose, 12.5 g, PRN  glucagon (rDNA), 1 mg, PRN  dextrose, 100 mL/hr, PRN  polyethylene glycol, 17 g, Daily PRN  promethazine, 12.5 mg, Q6H PRN

## 2020-09-24 NOTE — PROGRESS NOTES
Ursula Ro (1955)    Daily Progress Note-  Walter Tony MD                     Today's Date:     9/24/2020          Subjective:      Doing better this morning  Pain improved in leg but has most of her pain in her back  Pain rated pain is perceived as moderate (4-6 pain scale)  Denies shortness of breath or chest pain. Objective:     Patient Vitals for the past 4 hrs:   BP Temp Temp src Pulse Resp SpO2 Weight   09/24/20 0348 (!) 103/51 100.3 °F (37.9 °C) Oral 96 16 94 % 230 lb 9.6 oz (104.6 kg)     I/O last 3 completed shifts: In: 2077 [P.O.:240; I.V.:2370]  Out: 1450 [Urine:1050; Blood:400]  DRAIN/TUBE OUTPUT:       Physical Exam:   Orientation:  alert and oriented to person, place and time    Right Lower Extremity    Incision:  dressing in place, clean, dry and intact    Lower Extremity Motor :    Moving lower extremities without difficulty today. Able to dorsiflex and   plantar flex foot/ankle. Lower Extremity Sensory:   Neurovascularly intact to gross sensation and touch in lower extremities. Pulses:    present 2+ bilaterally lower extremities.       LABS   CBC:   Recent Labs     09/22/20  1140 09/23/20  0551 09/24/20  0322   WBC 9.7 8.6 9.8   HGB 13.3 10.6* 8.2*    259 197     BMP:    Recent Labs     09/22/20  1140 09/23/20  0551 09/24/20  0322    137 137   K 3.9 4.0 3.9   CL 98* 101 102   CO2 23 27 26   BUN 17 16 15   CREATININE 0.7 0.7 0.8   GLUCOSE 306* 229* 224*         Medications   Meds:    sodium chloride flush  10 mL Intravenous 2 times per day    sennosides-docusate sodium  1 tablet Oral BID    acetaminophen  650 mg Oral Q6H    enoxaparin  40 mg Subcutaneous Daily    glipiZIDE  10 mg Oral Daily    famotidine  20 mg Oral BID    insulin lispro  0-12 Units Subcutaneous TID WC    insulin lispro  0-6 Units

## 2020-09-24 NOTE — PLAN OF CARE
Problem: Pain:  Goal: Pain level will decrease  Description: Pain level will decrease  Outcome: Ongoing  Goal: Control of acute pain  Description: Control of acute pain  Outcome: Ongoing  Goal: Control of chronic pain  Description: Control of chronic pain  Outcome: Ongoing  Goal: Patient's pain/discomfort is manageable  Description: Patient's pain/discomfort is manageable  Outcome: Ongoing     Problem: Infection:  Goal: Will remain free from infection  Description: Will remain free from infection  Outcome: Ongoing     Problem: Safety:  Goal: Free from accidental physical injury  Description: Free from accidental physical injury  Outcome: Ongoing  Goal: Free from intentional harm  Description: Free from intentional harm  Outcome: Ongoing     Problem: Daily Care:  Goal: Daily care needs are met  Description: Daily care needs are met  Outcome: Ongoing     Problem: Skin Integrity:  Goal: Skin integrity will stabilize  Description: Skin integrity will stabilize  Outcome: Ongoing     Problem: Discharge Planning:  Goal: Patients continuum of care needs are met  Description: Patients continuum of care needs are met  Outcome: Ongoing     Problem: Skin Integrity:  Goal: Will show no infection signs and symptoms  Description: Will show no infection signs and symptoms  Outcome: Ongoing  Goal: Absence of new skin breakdown  Description: Absence of new skin breakdown  Outcome: Ongoing     Problem: Falls - Risk of:  Goal: Will remain free from falls  Description: Will remain free from falls  Outcome: Ongoing  Goal: Absence of physical injury  Description: Absence of physical injury  Outcome: Ongoing

## 2020-09-25 ENCOUNTER — APPOINTMENT (OUTPATIENT)
Dept: GENERAL RADIOLOGY | Age: 65
DRG: 481 | End: 2020-09-25
Attending: INTERNAL MEDICINE
Payer: COMMERCIAL

## 2020-09-25 LAB
BASOPHILS ABSOLUTE: 0 K/CU MM
BASOPHILS RELATIVE PERCENT: 0.4 % (ref 0–1)
DIFFERENTIAL TYPE: ABNORMAL
EOSINOPHILS ABSOLUTE: 0.1 K/CU MM
EOSINOPHILS RELATIVE PERCENT: 0.5 % (ref 0–3)
GLUCOSE BLD-MCNC: 143 MG/DL (ref 70–99)
GLUCOSE BLD-MCNC: 214 MG/DL (ref 70–99)
GLUCOSE BLD-MCNC: 226 MG/DL (ref 70–99)
GLUCOSE BLD-MCNC: 295 MG/DL (ref 70–99)
HCT VFR BLD CALC: 24.2 % (ref 37–47)
HCT VFR BLD CALC: 30.5 % (ref 37–47)
HEMOGLOBIN: 7.4 GM/DL (ref 12.5–16)
HEMOGLOBIN: 8.3 GM/DL (ref 12.5–16)
IMMATURE NEUTROPHIL %: 0.8 % (ref 0–0.43)
LYMPHOCYTES ABSOLUTE: 2.3 K/CU MM
LYMPHOCYTES RELATIVE PERCENT: 19.8 % (ref 24–44)
MCH RBC QN AUTO: 28.9 PG (ref 27–31)
MCHC RBC AUTO-ENTMCNC: 30.6 % (ref 32–36)
MCV RBC AUTO: 94.5 FL (ref 78–100)
MONOCYTES ABSOLUTE: 1.1 K/CU MM
MONOCYTES RELATIVE PERCENT: 9.2 % (ref 0–4)
NUCLEATED RBC %: 0 %
PDW BLD-RTO: 13.2 % (ref 11.7–14.9)
PLATELET # BLD: 191 K/CU MM (ref 140–440)
PMV BLD AUTO: 10.8 FL (ref 7.5–11.1)
RBC # BLD: 2.56 M/CU MM (ref 4.2–5.4)
SEGMENTED NEUTROPHILS ABSOLUTE COUNT: 7.9 K/CU MM
SEGMENTED NEUTROPHILS RELATIVE PERCENT: 69.3 % (ref 36–66)
TOTAL IMMATURE NEUTOROPHIL: 0.09 K/CU MM
TOTAL NUCLEATED RBC: 0 K/CU MM
WBC # BLD: 11.4 K/CU MM (ref 4–10.5)

## 2020-09-25 PROCEDURE — 6370000000 HC RX 637 (ALT 250 FOR IP): Performed by: HOSPITALIST

## 2020-09-25 PROCEDURE — 97116 GAIT TRAINING THERAPY: CPT

## 2020-09-25 PROCEDURE — 6360000002 HC RX W HCPCS: Performed by: NURSE PRACTITIONER

## 2020-09-25 PROCEDURE — 1200000000 HC SEMI PRIVATE

## 2020-09-25 PROCEDURE — 2580000003 HC RX 258: Performed by: ORTHOPAEDIC SURGERY

## 2020-09-25 PROCEDURE — 85018 HEMOGLOBIN: CPT

## 2020-09-25 PROCEDURE — 6360000002 HC RX W HCPCS: Performed by: ORTHOPAEDIC SURGERY

## 2020-09-25 PROCEDURE — 6370000000 HC RX 637 (ALT 250 FOR IP): Performed by: ORTHOPAEDIC SURGERY

## 2020-09-25 PROCEDURE — 94761 N-INVAS EAR/PLS OXIMETRY MLT: CPT

## 2020-09-25 PROCEDURE — 97530 THERAPEUTIC ACTIVITIES: CPT

## 2020-09-25 PROCEDURE — 2700000000 HC OXYGEN THERAPY PER DAY

## 2020-09-25 PROCEDURE — 85025 COMPLETE CBC W/AUTO DIFF WBC: CPT

## 2020-09-25 PROCEDURE — 2580000003 HC RX 258: Performed by: INTERNAL MEDICINE

## 2020-09-25 PROCEDURE — 82962 GLUCOSE BLOOD TEST: CPT

## 2020-09-25 PROCEDURE — 97110 THERAPEUTIC EXERCISES: CPT

## 2020-09-25 PROCEDURE — 71045 X-RAY EXAM CHEST 1 VIEW: CPT

## 2020-09-25 PROCEDURE — 36415 COLL VENOUS BLD VENIPUNCTURE: CPT

## 2020-09-25 PROCEDURE — 84145 PROCALCITONIN (PCT): CPT

## 2020-09-25 PROCEDURE — 94150 VITAL CAPACITY TEST: CPT

## 2020-09-25 PROCEDURE — 51798 US URINE CAPACITY MEASURE: CPT

## 2020-09-25 PROCEDURE — 85014 HEMATOCRIT: CPT

## 2020-09-25 PROCEDURE — 94664 DEMO&/EVAL PT USE INHALER: CPT

## 2020-09-25 PROCEDURE — 6370000000 HC RX 637 (ALT 250 FOR IP): Performed by: INTERNAL MEDICINE

## 2020-09-25 PROCEDURE — 87040 BLOOD CULTURE FOR BACTERIA: CPT

## 2020-09-25 RX ORDER — SODIUM CHLORIDE 9 MG/ML
INJECTION, SOLUTION INTRAVENOUS CONTINUOUS
Status: DISCONTINUED | OUTPATIENT
Start: 2020-09-25 | End: 2020-09-26

## 2020-09-25 RX ORDER — 0.9 % SODIUM CHLORIDE 0.9 %
500 INTRAVENOUS SOLUTION INTRAVENOUS ONCE
Status: COMPLETED | OUTPATIENT
Start: 2020-09-25 | End: 2020-09-25

## 2020-09-25 RX ORDER — INSULIN GLARGINE 100 [IU]/ML
15 INJECTION, SOLUTION SUBCUTANEOUS NIGHTLY
Status: DISCONTINUED | OUTPATIENT
Start: 2020-09-25 | End: 2020-09-28

## 2020-09-25 RX ADMIN — ACETAMINOPHEN 650 MG: 325 TABLET ORAL at 20:45

## 2020-09-25 RX ADMIN — ACETAMINOPHEN 650 MG: 325 TABLET ORAL at 05:10

## 2020-09-25 RX ADMIN — FAMOTIDINE 20 MG: 20 TABLET ORAL at 09:02

## 2020-09-25 RX ADMIN — ACETAMINOPHEN 650 MG: 325 TABLET ORAL at 16:15

## 2020-09-25 RX ADMIN — LOSARTAN POTASSIUM 50 MG: 25 TABLET, FILM COATED ORAL at 09:02

## 2020-09-25 RX ADMIN — DOCUSATE SODIUM 50MG AND SENNOSIDES 8.6MG 1 TABLET: 8.6; 5 TABLET, FILM COATED ORAL at 09:02

## 2020-09-25 RX ADMIN — INSULIN LISPRO 4 UNITS: 100 INJECTION, SOLUTION INTRAVENOUS; SUBCUTANEOUS at 10:03

## 2020-09-25 RX ADMIN — HYDROMORPHONE HYDROCHLORIDE 0.5 MG: 1 INJECTION, SOLUTION INTRAMUSCULAR; INTRAVENOUS; SUBCUTANEOUS at 14:01

## 2020-09-25 RX ADMIN — INSULIN LISPRO 4 UNITS: 100 INJECTION, SOLUTION INTRAVENOUS; SUBCUTANEOUS at 17:11

## 2020-09-25 RX ADMIN — HYDROMORPHONE HYDROCHLORIDE 0.5 MG: 1 INJECTION, SOLUTION INTRAMUSCULAR; INTRAVENOUS; SUBCUTANEOUS at 10:54

## 2020-09-25 RX ADMIN — HYDROMORPHONE HYDROCHLORIDE 0.5 MG: 1 INJECTION, SOLUTION INTRAMUSCULAR; INTRAVENOUS; SUBCUTANEOUS at 17:50

## 2020-09-25 RX ADMIN — DOCUSATE SODIUM 50MG AND SENNOSIDES 8.6MG 1 TABLET: 8.6; 5 TABLET, FILM COATED ORAL at 20:46

## 2020-09-25 RX ADMIN — OXYCODONE HYDROCHLORIDE 10 MG: 10 TABLET ORAL at 09:02

## 2020-09-25 RX ADMIN — SODIUM CHLORIDE, PRESERVATIVE FREE 10 ML: 5 INJECTION INTRAVENOUS at 20:45

## 2020-09-25 RX ADMIN — HYDROMORPHONE HYDROCHLORIDE 0.5 MG: 1 INJECTION, SOLUTION INTRAMUSCULAR; INTRAVENOUS; SUBCUTANEOUS at 22:14

## 2020-09-25 RX ADMIN — FAMOTIDINE 20 MG: 20 TABLET ORAL at 20:46

## 2020-09-25 RX ADMIN — INSULIN LISPRO 6 UNITS: 100 INJECTION, SOLUTION INTRAVENOUS; SUBCUTANEOUS at 12:06

## 2020-09-25 RX ADMIN — LOSARTAN POTASSIUM 50 MG: 25 TABLET, FILM COATED ORAL at 20:46

## 2020-09-25 RX ADMIN — OXYCODONE HYDROCHLORIDE 10 MG: 10 TABLET ORAL at 16:14

## 2020-09-25 RX ADMIN — ENOXAPARIN SODIUM 40 MG: 40 INJECTION SUBCUTANEOUS at 09:03

## 2020-09-25 RX ADMIN — GLIPIZIDE 10 MG: 5 TABLET ORAL at 09:02

## 2020-09-25 RX ADMIN — SODIUM CHLORIDE 500 ML: 9 INJECTION, SOLUTION INTRAVENOUS at 17:45

## 2020-09-25 ASSESSMENT — PAIN DESCRIPTION - ORIENTATION
ORIENTATION: RIGHT;LOWER
ORIENTATION: RIGHT

## 2020-09-25 ASSESSMENT — PAIN SCALES - GENERAL
PAINLEVEL_OUTOF10: 0
PAINLEVEL_OUTOF10: 10
PAINLEVEL_OUTOF10: 10
PAINLEVEL_OUTOF10: 9
PAINLEVEL_OUTOF10: 10
PAINLEVEL_OUTOF10: 0
PAINLEVEL_OUTOF10: 10
PAINLEVEL_OUTOF10: 0
PAINLEVEL_OUTOF10: 0
PAINLEVEL_OUTOF10: 8
PAINLEVEL_OUTOF10: 0
PAINLEVEL_OUTOF10: 10
PAINLEVEL_OUTOF10: 3
PAINLEVEL_OUTOF10: 5
PAINLEVEL_OUTOF10: 0

## 2020-09-25 ASSESSMENT — PAIN - FUNCTIONAL ASSESSMENT
PAIN_FUNCTIONAL_ASSESSMENT: PREVENTS OR INTERFERES SOME ACTIVE ACTIVITIES AND ADLS
PAIN_FUNCTIONAL_ASSESSMENT: PREVENTS OR INTERFERES WITH ALL ACTIVE AND SOME PASSIVE ACTIVITIES

## 2020-09-25 ASSESSMENT — PAIN DESCRIPTION - PROGRESSION: CLINICAL_PROGRESSION: GRADUALLY IMPROVING

## 2020-09-25 ASSESSMENT — PAIN DESCRIPTION - PAIN TYPE: TYPE: SURGICAL PAIN

## 2020-09-25 ASSESSMENT — PAIN DESCRIPTION - ONSET: ONSET: ON-GOING

## 2020-09-25 ASSESSMENT — PAIN DESCRIPTION - DESCRIPTORS: DESCRIPTORS: DISCOMFORT

## 2020-09-25 ASSESSMENT — PAIN DESCRIPTION - FREQUENCY: FREQUENCY: INTERMITTENT

## 2020-09-25 ASSESSMENT — PAIN DESCRIPTION - LOCATION
LOCATION: HIP
LOCATION: HIP;LEG;BACK

## 2020-09-25 NOTE — PROGRESS NOTES
Pt had only voided 100 mL today. Bladder scan showed only 81mL in bladder. [address] [facility] [address] Leobardo Hayes at 8506 and got telephone orders for 500mL bolus.

## 2020-09-25 NOTE — PROGRESS NOTES
Physical Therapy  Appears to have no more planned imaging of the spine at this time. Dr Aleoj Simpson has seen the imaging and noted pt had \"No acute lumbar fracture\" and \"Mild compression deformities of T11 and T12\". Will plan to continue with therapy and maintain TTWB to RLE as well as spinal precautions.

## 2020-09-25 NOTE — PROGRESS NOTES
Occupational Therapy  . Occupational Therapy Treatment Note  Name: Chris Loomis MRN: [id number] :   1955   Date:  2020   Admission Date: 2020 Room:  [address] [place], [place] [address]A   Restrictions/Precautions:    General precautions; Fall Risk; TTWB RLE    Communication with other providers:  Per chart review, patient is appropriate for therapeutic intervention. Notified Nurse Nate of pt's pain level and request for pain meds. Co-Tx c PTA Bindu Sears. Per orthopedic note on 2020, no acute lumbar fracture noted, mild compression deformities of T11 and T12. Plan included mobilization c physical therapy. Subjective:  Patient states: \"My back hurts so bad, I don't even think about my leg. I think I sat up in the chair too long. \" Pt reports being up in the chair >3 hours. Pt educated to notify nursing staff for transfer back to bed before pain rises too much, educated for benefits of multiple transfers to chair throughout the day c shorter times spent up in the chair until tolerance increases. Pain:   Location, Type, Intensity (0/10 to 10/10):  10/10, back    Objective:    Observation:  Pt received in bedside chair, daughter present in room. Pt exhibits s/s of pain behaviors c movement and transfers, requires increased time to perform, cues for safe body positioning and spinal protection techniques for pain management. Objective Measures:  N/A    Treatment, including education:  Therapeutic Activity Training:   Therapeutic activity training was instructed today. Cues were given for safety, sequence, UE/LE placement, awareness, and balance. Activities performed today included bed mobility training, sup-sit, sit-stand, SPT. Attempted transfer to [facility] initially but patient unable to transfer to L-side due to pain level. Bedside chair then moved into position near bed. Max A x2 c RW for sit to stand from chair + increased time and effort. Mod A x2 c RW for stand to sit.   Stand Step Transfer: Max A x2 c RW  Pt requires max cues for safe body positioning / sequencing / advancement of walker + min cues for TTWB precautions  Supine to sit performed c Max A x2 using leg . Pt reported unable to log roll due to R hip and was assisted c care to maintain spinal protection throughout to manage pain level. Scooting: Min A at shoulders c use of bed positioned in Trendelenburg c L knee flexed for assistance for upward push. All therapeutic intervention performed c emphasis on dynamic balance / standing tolerance to inc strength, endurance and act tolerance for inc Indep c ADL tasks, func transfers / mobility. Safety  Patient safely in bed + alarm activated at end of session, with call light/phone in reach, and nursing aware. Gait belt was used for func transfers / mobility. Daughter present at bed side, nurse arriving c pain meds. Assessment / Impression:        Patient's tolerance of treatment:  Fair   Adverse Reaction: None  Significant change in status and impact:  None  Barriers to improvement:  Pain, decreased strength, decreased safety    Plan for Next Session:    Continue per OT POC c plan to address functional transfers during ADLs and sitting tolerance. Time in:  1320  Time out:  1400  Timed treatment minutes:  40  Total treatment time:  40    Electronically signed by:    VIOLETTA Gerber  9/25/2020, 2:57 PM    Previously filed values:       Goals:  By d/c or goals met:     Pt will perform all bed mobility with ModAx2 in prep for EOB/OOB activity. Pt will perform all functional transfers with ModAx2 and appropriate use of LRD to bed, toilet, chair in prep for increased functional independence. Pt will perform UB ADLs with ModA to increase functional independence. Pt will perform LB ADLs with MaxA to increase functional independence. Pt will perform all aspects of toileting with MaxA to increase functional independence.    Pt will participate in therex/therax c emphasis on strength, activity tolerance,  safety, RANI tasks.

## 2020-09-25 NOTE — PROGRESS NOTES
Physical Therapy    Physical Therapy Treatment Note  Name: Jane Disla MRN: [id number] :   1955   Date:  2020   Admission Date: 2020 Room:  [address] [place] [address]-A   Restrictions/Precautions:        TTWB to RLE as well as spinal precautions.   Communication with other providers:  Nurse gave IV pain meds after tx session. Subjective:  Patient states:  Pt reports very tired and painful and has been up in chair 3 hrs. Pt's dghter present and very supportive. Pain:   Location, Type, Intensity (0/10 to 10/10):  Rates pain in back and hip 10  Objective:    Observation:  Alert and oriented  Treatment, including education/measures:  Pt requesting to use BSC but was unable to transfer after set up on left side of pt due to c/o pain. BSC removed and then moved chair to bed side. Sit to stand from chair to rw  max assist of 2 needing increased time and effort. Difficult to assess if she maintain TTWB but did appear to maintain due to unable to tolerate any WB.  amb 4-5 steps with rw max assist of 2 and assist to help move rw. Pt needing increased time and effort moving to left side. Mod assist of 2 stand to sit. Sit to sup max assist of 2 using leg . Pt is unable to log roll due to right hip but had assist at trunk and legs. Scooting in sup with bed in trendelenburg left knee bend and min assist from BROWN at shoulders/ upper trunk. Safety  Patient left safely in the bed, with call light/phone in reach with alarm applied. Gait belt was used for transfers and gait. Assessment / Impression:       Patient's tolerance of treatment:  fair   Adverse Reaction: na  Significant change in status and impact:  na  Barriers to improvement:  Pain, strength and safety  Plan for Next Session:    Cont.  POC  Time in:  1320  Time out:  1400  Timed treatment minutes:  40  Total treatment time:  40    Previously filed items:  Social/Functional History  Lives With: Alone  Type of Home: Apartment  Home Layout: One level  Home

## 2020-09-25 NOTE — PROGRESS NOTES
Physical Therapy    Physical Therapy Treatment Note  Name: Tarun Pang MRN: [id number] :   1955   Date:  2020   Admission Date: 2020 Room:  [address] [place] [address]   Restrictions/Precautions:        TTWB to RLE as well as spinal precautions. Communication with other providers:  Per nurse ok to tx and gave pain meds before tx session  Subjective:  Patient states:  Pt agreeable to tx. dghter present and very supportive. Pain:   Location, Type, Intensity (0/10 to 10/10): At start of tx back 8 and hip 5, at end of tx both 8  Objective:    Observation:  Alert and oriented sup in bed. Pt needing increased time, effort and encouragement with all ex and mobility. Treatment, including education/measures:  Ex with written copy;  Trunk stretches with shoulder flex and cues for deep breathing  20 reps aps  Pt attempted quad sets and was able to perform on left but not on RLE. With leg  pt was able to lift lower leg right leg for AAROM. 10 reps glut sets  10 reps heel slides with leg    5 reps abd/add with leg  very limited AAROM   sup to sit with HOB up, bed rail and leg  max assist but allowing pt to move very slowly and as tolerated  Sit to stand from elevated bed mod assist and cues\  Pt was able to take 4 small steps mod assist  with rw bed to chair but needing increased time and effort. Stand to sit min assist and cues  Safety  Patient left safely in the chair, with call light/phone in reach with alarm applied. Gait belt was used for transfers and gait. Assessment / Impression:       Patient's tolerance of treatment:  fair  Adverse Reaction: na  Significant change in status and impact:  na  Barriers to improvement:  Pain, strength, and safety  Plan for Next Session:    Cont.  POC  Time in:  0940  Time out:  1050  Timed treatment minutes:  70  Total treatment time:  79    Previously filed items:  Social/Functional History  Lives With: Alone  Type of Home: Apartment  Home Layout: One level  Home Access: Level entry  Bathroom Equipment: Grab bars in shower  Bathroom Accessibility: Accessible  Receives Help From: Family  ADL Assistance: Independent  Homemaking Assistance: Independent  Homemaking Responsibilities: Yes  Ambulation Assistance: Independent  Transfer Assistance: Independent  Active : Yes  Occupation: Retired  Short term goals  Time Frame for [employer] term goals: 1 week  Short term goal 1: Pt will perform rolling Yosvany  Short term goal 2: Pt will transition sit><supine maxA  Short term goal 3: Pt will sit and perform light dynamic activity x 5 minutes SBA, single UE support  Short term goal 4: Pt will transfer between surfaces maxA x 2 while maintainin TTWB RLE       Electronically signed by:    Felicita Cox PTA  9/25/2020, 8:39 AM

## 2020-09-25 NOTE — PROGRESS NOTES
Hospitalist Progress Note      Name:  Shana Tadeo /Age/Sex: 1955  (76 y.o. female)   MRN & CSN:  [id number] & [id number] Admission Date/Time: 2020  6:41 PM   Location:  [address] [place] [address] PCP: Tobias Day: 4    Assessment and Plan:   Shana Tadeo is a 76 y.o.  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 Femoral fracture, right: Status post open reduction internal fixation. · Pain control, diet per surgery  · Physical therapy and Occupational Therapy recommending skilled nursing facility. · DVT prophylaxis. 2. Fever, tachycardia, hypoxemia: Could be from atelectasis. · WBC normal, no respiratory symptoms. · Blood culture pending, lactic acid normal.  · Advised incentive spirometry. 3. Low Back Pain: could still be related to the fall  · Lumbar x ray with mild compression of T11 and T12.    4. Hypertension: Continue losartan. 5. Acute blood loss anemia: Hemoglobin dropped to 7.4 this morning but has improved to 8.3. Transfuse for hemoglobin less than 7.    6. Diabetes: Sliding scale hypoglycemia protocol. Hemoglobin A1c 8.2. Resume glipizide. Started on Lantus and Humalog due to uncontrolled BG. Seeing blood sugar 226. Adjust Lantus. Continue Humalog. 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 Still with pain. No cough or shortness of breath. Fever yesterday at 101.4. No dysuria or urinary frequency.     Ten point ROS reviewed Or  HYDROmorphone, 0.5 mg, Q3H PRN  magnesium hydroxide, 30 mL, Daily PRN  oxyCODONE, 5 mg, Q4H PRN    Or  oxyCODONE, 10 mg, Q4H PRN  glucose, 15 g, PRN  dextrose, 12.5 g, PRN  glucagon (rDNA), 1 mg, PRN  dextrose, 100 mL/hr, PRN  polyethylene glycol, 17 g, Daily PRN  promethazine, 12.5 mg, Q6H PRN    Or  ondansetron, 4 mg, Q6H PRN  potassium chloride, 40 mEq, PRN    Or  potassium alternative oral replacement, 40 mEq, PRN    Or  potassium chloride, 10 mEq, PRN  magnesium sulfate, 2 g, PRN  dextrose, 100 mL/hr, PRN  HYDROmorphone, 0.5 mg, Q4H PRN      Recent Labs     09/23/20  0551 09/24/20  0322 09/25/20  0538 09/25/20  1211   WBC 8.6 9.8 11.4*  --    HGB 10.6* 8.2* 7.4* 8.3*   HCT 34.7* 26.8* 24.2* 30.5*    197 191  --       Recent Labs     09/23/20  0551 09/24/20  0322    137   K 4.0 3.9    102   CO2 27 26   BUN 16 15   CREATININE 0.7 0.8     Recent Labs     09/23/20  0551   AST 14*   ALT 15   BILITOT 0.6   ALKPHOS 80     Imaging reviewed    Electronically signed by Jacque Bethea MD on 9/25/2020 at 2:12 PM

## 2020-09-25 NOTE — PROGRESS NOTES
Celine Cruz (1955)    Daily Progress Note-  Edin Fajardo MD                     Today's Date:     9/25/2020              Patient Vitals for the past 4 hrs:   BP Temp Temp src Pulse Resp SpO2   09/25/20 0558 (!) 111/56 99.3 °F (37.4 °C) Oral 105 17 95 %   09/25/20 0400 134/71 99.3 °F (37.4 °C) Oral 89 16 97 %     I/O last 3 completed shifts: In: 1240 [P.O.:280; I.V.:960]  Out: 2050 [Urine:2050]  DRAIN/TUBE OUTPUT:         LABS   CBC:   Recent Labs     09/23/20  0551 09/24/20  0322 09/25/20  0538   WBC 8.6 9.8 11.4*   HGB 10.6* 8.2* 7.4*    197 191     BMP:    Recent Labs     09/22/20  1140 09/23/20  0551 09/24/20  0322    137 137   K 3.9 4.0 3.9   CL 98* 101 102   CO2 23 27 26   BUN 17 16 15   CREATININE 0.7 0.7 0.8   GLUCOSE 306* 229* 224*         Medications   Meds:    insulin glargine  15 Units Subcutaneous Nightly    insulin lispro  5 Units Subcutaneous TID WC    sodium chloride flush  10 mL Intravenous 2 times per day    sennosides-docusate sodium  1 tablet Oral BID    acetaminophen  650 mg Oral Q6H    enoxaparin  40 mg Subcutaneous Daily    glipiZIDE  10 mg Oral Daily    famotidine  20 mg Oral BID    insulin lispro  0-12 Units Subcutaneous TID WC    insulin lispro  0-6 Units Subcutaneous Nightly    losartan  50 mg Oral BID       Assessment and Plan     Impression:   1. POD#2 Right femur IM nail for comminuted femur fracture   2. Diabetes   3. Back pain S/P fall   4.   Low grade fever    Plan:  1:  Mobilize with Physical therapy   -TTWB   2.  Acute blood loss anemia, not a complication   -ZG-7.9 , 7.4   -Repeat in am   -May need PRBC if Hb <7  3:  Continue Deep venous thrombosis prophylaxis    -Chemoprophylaxis Lovenox   -Mechanical-SIDNEY hose, -SCD's, ambulation  4:  Back pain   -No acute lumbar fracture   -Mild compression deformities of T11 and T12  5. Continue Pain Control   -wean to PO meds  6.    D/C Planning:     -Likely home with home Health     -OK to DC home when medically stable   -FU in 2 weeks, DC instructions in chart       Carlos Soriano MD

## 2020-09-26 LAB
BACTERIA: NEGATIVE /HPF
BASOPHILS ABSOLUTE: 0.1 K/CU MM
BASOPHILS RELATIVE PERCENT: 0.5 % (ref 0–1)
BILIRUBIN URINE: NEGATIVE MG/DL
BLOOD, URINE: ABNORMAL
CLARITY: CLEAR
COLOR: YELLOW
DIFFERENTIAL TYPE: ABNORMAL
EOSINOPHILS ABSOLUTE: 0.2 K/CU MM
EOSINOPHILS RELATIVE PERCENT: 1.4 % (ref 0–3)
GLUCOSE BLD-MCNC: 118 MG/DL (ref 70–99)
GLUCOSE BLD-MCNC: 171 MG/DL (ref 70–99)
GLUCOSE BLD-MCNC: 195 MG/DL (ref 70–99)
GLUCOSE BLD-MCNC: 198 MG/DL (ref 70–99)
GLUCOSE, URINE: 150 MG/DL
GRANULAR CASTS: 1 /LPF
HCT VFR BLD CALC: 22.9 % (ref 37–47)
HCT VFR BLD CALC: 23.6 % (ref 37–47)
HEMOGLOBIN: 6.8 GM/DL (ref 12.5–16)
HEMOGLOBIN: 7.4 GM/DL (ref 12.5–16)
HYALINE CASTS: 2 /LPF
IMMATURE NEUTROPHIL %: 0.6 % (ref 0–0.43)
KETONES, URINE: ABNORMAL MG/DL
LEUKOCYTE ESTERASE, URINE: NEGATIVE
LYMPHOCYTES ABSOLUTE: 2.3 K/CU MM
LYMPHOCYTES RELATIVE PERCENT: 21 % (ref 24–44)
MCH RBC QN AUTO: 29.2 PG (ref 27–31)
MCHC RBC AUTO-ENTMCNC: 29.7 % (ref 32–36)
MCV RBC AUTO: 98.3 FL (ref 78–100)
MONOCYTES ABSOLUTE: 0.8 K/CU MM
MONOCYTES RELATIVE PERCENT: 7.3 % (ref 0–4)
MUCUS: ABNORMAL HPF
NITRITE URINE, QUANTITATIVE: NEGATIVE
NUCLEATED RBC %: 0 %
PDW BLD-RTO: 13.2 % (ref 11.7–14.9)
PH, URINE: 5 (ref 5–8)
PLATELET # BLD: 215 K/CU MM (ref 140–440)
PMV BLD AUTO: 11.1 FL (ref 7.5–11.1)
PROCALCITONIN: 0.13
PROTEIN UA: 30 MG/DL
RBC # BLD: 2.33 M/CU MM (ref 4.2–5.4)
RBC URINE: 2 /HPF (ref 0–6)
SEGMENTED NEUTROPHILS ABSOLUTE COUNT: 7.5 K/CU MM
SEGMENTED NEUTROPHILS RELATIVE PERCENT: 69.2 % (ref 36–66)
SPECIFIC GRAVITY UA: 1.02 (ref 1–1.03)
SQUAMOUS EPITHELIAL: <1 /HPF
TOTAL IMMATURE NEUTOROPHIL: 0.07 K/CU MM
TOTAL NUCLEATED RBC: 0 K/CU MM
TRICHOMONAS: ABNORMAL /HPF
UROBILINOGEN, URINE: NORMAL MG/DL (ref 0.2–1)
WBC # BLD: 10.8 K/CU MM (ref 4–10.5)
WBC UA: 2 /HPF (ref 0–5)

## 2020-09-26 PROCEDURE — P9016 RBC LEUKOCYTES REDUCED: HCPCS

## 2020-09-26 PROCEDURE — 6360000002 HC RX W HCPCS: Performed by: HOSPITALIST

## 2020-09-26 PROCEDURE — 82962 GLUCOSE BLOOD TEST: CPT

## 2020-09-26 PROCEDURE — 36430 TRANSFUSION BLD/BLD COMPNT: CPT

## 2020-09-26 PROCEDURE — 86850 RBC ANTIBODY SCREEN: CPT

## 2020-09-26 PROCEDURE — 6360000002 HC RX W HCPCS: Performed by: NURSE PRACTITIONER

## 2020-09-26 PROCEDURE — 86922 COMPATIBILITY TEST ANTIGLOB: CPT

## 2020-09-26 PROCEDURE — 51702 INSERT TEMP BLADDER CATH: CPT

## 2020-09-26 PROCEDURE — 6370000000 HC RX 637 (ALT 250 FOR IP): Performed by: ORTHOPAEDIC SURGERY

## 2020-09-26 PROCEDURE — 97110 THERAPEUTIC EXERCISES: CPT

## 2020-09-26 PROCEDURE — 2580000003 HC RX 258: Performed by: INTERNAL MEDICINE

## 2020-09-26 PROCEDURE — 6370000000 HC RX 637 (ALT 250 FOR IP): Performed by: INTERNAL MEDICINE

## 2020-09-26 PROCEDURE — 85025 COMPLETE CBC W/AUTO DIFF WBC: CPT

## 2020-09-26 PROCEDURE — 36415 COLL VENOUS BLD VENIPUNCTURE: CPT

## 2020-09-26 PROCEDURE — 85018 HEMOGLOBIN: CPT

## 2020-09-26 PROCEDURE — 84145 PROCALCITONIN (PCT): CPT

## 2020-09-26 PROCEDURE — 6370000000 HC RX 637 (ALT 250 FOR IP): Performed by: HOSPITALIST

## 2020-09-26 PROCEDURE — 51798 US URINE CAPACITY MEASURE: CPT

## 2020-09-26 PROCEDURE — 85014 HEMATOCRIT: CPT

## 2020-09-26 PROCEDURE — 51701 INSERT BLADDER CATHETER: CPT

## 2020-09-26 PROCEDURE — 86900 BLOOD TYPING SEROLOGIC ABO: CPT

## 2020-09-26 PROCEDURE — 6360000002 HC RX W HCPCS: Performed by: ORTHOPAEDIC SURGERY

## 2020-09-26 PROCEDURE — 81001 URINALYSIS AUTO W/SCOPE: CPT

## 2020-09-26 PROCEDURE — 2580000003 HC RX 258: Performed by: ORTHOPAEDIC SURGERY

## 2020-09-26 PROCEDURE — 94761 N-INVAS EAR/PLS OXIMETRY MLT: CPT

## 2020-09-26 PROCEDURE — 86901 BLOOD TYPING SEROLOGIC RH(D): CPT

## 2020-09-26 PROCEDURE — 1200000000 HC SEMI PRIVATE

## 2020-09-26 RX ORDER — 0.9 % SODIUM CHLORIDE 0.9 %
250 INTRAVENOUS SOLUTION INTRAVENOUS ONCE
Status: COMPLETED | OUTPATIENT
Start: 2020-09-26 | End: 2020-09-26

## 2020-09-26 RX ORDER — POLYETHYLENE GLYCOL 3350 17 G/17G
17 POWDER, FOR SOLUTION ORAL DAILY
Status: DISCONTINUED | OUTPATIENT
Start: 2020-09-26 | End: 2020-09-27

## 2020-09-26 RX ADMIN — SODIUM CHLORIDE, PRESERVATIVE FREE 10 ML: 5 INJECTION INTRAVENOUS at 21:13

## 2020-09-26 RX ADMIN — INSULIN LISPRO 2 UNITS: 100 INJECTION, SOLUTION INTRAVENOUS; SUBCUTANEOUS at 11:44

## 2020-09-26 RX ADMIN — ACETAMINOPHEN 650 MG: 325 TABLET ORAL at 08:44

## 2020-09-26 RX ADMIN — DOCUSATE SODIUM 50MG AND SENNOSIDES 8.6MG 1 TABLET: 8.6; 5 TABLET, FILM COATED ORAL at 08:44

## 2020-09-26 RX ADMIN — FAMOTIDINE 20 MG: 20 TABLET ORAL at 21:13

## 2020-09-26 RX ADMIN — OXYCODONE HYDROCHLORIDE 10 MG: 10 TABLET ORAL at 08:57

## 2020-09-26 RX ADMIN — GLIPIZIDE 10 MG: 5 TABLET ORAL at 08:44

## 2020-09-26 RX ADMIN — ACETAMINOPHEN 650 MG: 325 TABLET ORAL at 21:13

## 2020-09-26 RX ADMIN — DOCUSATE SODIUM 50MG AND SENNOSIDES 8.6MG 1 TABLET: 8.6; 5 TABLET, FILM COATED ORAL at 21:13

## 2020-09-26 RX ADMIN — HYDROMORPHONE HYDROCHLORIDE 0.5 MG: 1 INJECTION, SOLUTION INTRAMUSCULAR; INTRAVENOUS; SUBCUTANEOUS at 06:21

## 2020-09-26 RX ADMIN — FAMOTIDINE 20 MG: 20 TABLET ORAL at 08:44

## 2020-09-26 RX ADMIN — POLYETHYLENE GLYCOL (3350) 17 G: 17 POWDER, FOR SOLUTION ORAL at 13:13

## 2020-09-26 RX ADMIN — SODIUM CHLORIDE 250 ML: 9 INJECTION, SOLUTION INTRAVENOUS at 16:30

## 2020-09-26 RX ADMIN — HYDROMORPHONE HYDROCHLORIDE 0.5 MG: 1 INJECTION, SOLUTION INTRAMUSCULAR; INTRAVENOUS; SUBCUTANEOUS at 21:32

## 2020-09-26 RX ADMIN — ACETAMINOPHEN 650 MG: 325 TABLET ORAL at 05:27

## 2020-09-26 RX ADMIN — ENOXAPARIN SODIUM 40 MG: 40 INJECTION SUBCUTANEOUS at 08:44

## 2020-09-26 RX ADMIN — INSULIN LISPRO 2 UNITS: 100 INJECTION, SOLUTION INTRAVENOUS; SUBCUTANEOUS at 08:43

## 2020-09-26 RX ADMIN — LOSARTAN POTASSIUM 50 MG: 25 TABLET, FILM COATED ORAL at 21:13

## 2020-09-26 RX ADMIN — ONDANSETRON 4 MG: 2 INJECTION INTRAMUSCULAR; INTRAVENOUS at 21:32

## 2020-09-26 RX ADMIN — LOSARTAN POTASSIUM 50 MG: 25 TABLET, FILM COATED ORAL at 08:44

## 2020-09-26 RX ADMIN — HYDROMORPHONE HYDROCHLORIDE 0.5 MG: 1 INJECTION, SOLUTION INTRAMUSCULAR; INTRAVENOUS; SUBCUTANEOUS at 13:19

## 2020-09-26 RX ADMIN — ACETAMINOPHEN 650 MG: 325 TABLET ORAL at 16:29

## 2020-09-26 RX ADMIN — INSULIN GLARGINE 15 UNITS: 100 INJECTION, SOLUTION SUBCUTANEOUS at 21:33

## 2020-09-26 ASSESSMENT — PAIN DESCRIPTION - DESCRIPTORS
DESCRIPTORS: ACHING
DESCRIPTORS: ACHING

## 2020-09-26 ASSESSMENT — PAIN SCALES - GENERAL
PAINLEVEL_OUTOF10: 0
PAINLEVEL_OUTOF10: 0
PAINLEVEL_OUTOF10: 7
PAINLEVEL_OUTOF10: 0
PAINLEVEL_OUTOF10: 10
PAINLEVEL_OUTOF10: 10
PAINLEVEL_OUTOF10: 0
PAINLEVEL_OUTOF10: 4
PAINLEVEL_OUTOF10: 0
PAINLEVEL_OUTOF10: 8
PAINLEVEL_OUTOF10: 0

## 2020-09-26 ASSESSMENT — PAIN DESCRIPTION - ONSET
ONSET: ON-GOING
ONSET: ON-GOING

## 2020-09-26 ASSESSMENT — PAIN DESCRIPTION - PROGRESSION: CLINICAL_PROGRESSION: GRADUALLY WORSENING

## 2020-09-26 ASSESSMENT — PAIN DESCRIPTION - FREQUENCY
FREQUENCY: INTERMITTENT
FREQUENCY: INTERMITTENT

## 2020-09-26 ASSESSMENT — PAIN DESCRIPTION - ORIENTATION
ORIENTATION: RIGHT
ORIENTATION: RIGHT

## 2020-09-26 ASSESSMENT — PAIN DESCRIPTION - PAIN TYPE
TYPE: SURGICAL PAIN
TYPE: SURGICAL PAIN

## 2020-09-26 ASSESSMENT — PAIN DESCRIPTION - LOCATION
LOCATION: HIP
LOCATION: HIP

## 2020-09-26 ASSESSMENT — PAIN - FUNCTIONAL ASSESSMENT
PAIN_FUNCTIONAL_ASSESSMENT: PREVENTS OR INTERFERES SOME ACTIVE ACTIVITIES AND ADLS
PAIN_FUNCTIONAL_ASSESSMENT: PREVENTS OR INTERFERES SOME ACTIVE ACTIVITIES AND ADLS

## 2020-09-26 NOTE — PROGRESS NOTES
Night Shift RN Notes:  Patient has been voiding 100-200 at a time until the morning when she voiced inability to urinate and feeling full in the bladder. Bladder scan order was obtained and showed 400 cc and straight cath was done after, 630 cc was obtained during the straight cath. Urinalysis specimen was submitted. Education was provided.

## 2020-09-26 NOTE — PROGRESS NOTES
Hospitalist Progress Note      Name:  Olga Sotelo /Age/Sex: 1955  (76 y.o. female)   MRN & CSN:  [id number] & [id number] Admission Date/Time: 2020  6:41 PM   Location:  [address] [place] [address] PCP: Tobias Day: 5    Assessment and Plan:   Olga Sotelo is a 76 y.o.  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 Femoral fracture, right: Status post open reduction internal fixation. · Pain control, diet per surgery  · Physical therapy and Occupational Therapy recommending skilled nursing facility. · DVT prophylaxis. 2. Fever, tachycardia, hypoxemia: Could be from atelectasis. · WBC normal, no respiratory symptoms. · Blood culture pending, lactic acid normal.  · Procalcitonin normal.   · Advised incentive spirometry. 3. Low Back Pain: could still be related to the fall  · Lumbar x ray with mild compression of T11 and T12.    4. Hypertension: Continue losartan. 5. Acute blood loss anemia: Hemoglobin dropped to 6.8 admission. For PRBC 1 unit. Post transfusion HH. 6. Diabetes: Sliding scale hypoglycemia protocol. Hemoglobin A1c 8.2. Resume glipizide. Started on Lantus and Humalog due to uncontrolled BG. Fasting blood sugar 171. Adjust Lantus. Continue Humalog. 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 Pain is well controlled. No cough or shortness of breath. No abdominal pain. No urinary frequency. Denied dysuria.   No active

## 2020-09-26 NOTE — FLOWSHEET NOTE
Physical Therapy Treatment Note  Name: Jane Disla MRN: [id number] :   1955   Date:  2020   Admission Date: 2020 Room:  [address] [place], [place] [address]A   Restrictions/Precautions:        TTWB on right LE  Communication with other providers:  PT ok per nsazul Mcgee)   Subjective:  Patient states:  \"I am not having a very good morning. \"  Pain:   Location, Type, Intensity (0/10 to 10/10):  8/10 (right femur)   Objective:    Observation:  Pt seen in semi-sommer's position in bed at beginning of treatment. Agreeable to therapy. Pt left in semi-sommer's position in bed with call light and bed alarm at end of treatment. Treatment, including education/measures:  Exercises: Ankle pumps x 20 reps  Quad sets x 20 reps  Buttock squeezes x 20 reps  Heel slides x 10 reps (AAROM on right LE)  SAQs x 10 reps (AAROM on right LE)    Supplied patient with HEP handout as pt reported that she had lost her other HEP handout. Other: positioned two ice packs with towel on patient's right anterior thigh, discussed rationale for ice packs, and educated patient to keep ice packs placed for 20 minutes and to repeat each hour of the day to help reduce pain and control edema. Assessment / Impression:       Patient's tolerance of treatment:  Fair   Adverse Reaction: no   Significant change in status and impact:  No   Barriers to improvement:  Decreased overall strength, endurance.    Plan for Next Session:    Sitting EOB with 2-person assist; attempt transfers with 2-person assist; Exercises     Time in: 855  Time out:  940  Timed treatment minutes: 45  Total treatment time:  39    Previously filed items:  Social/Functional History  Lives With: Alone  Type of Home: Apartment  Home Layout: One level  Home Access: Level entry  [facility] Equipment: Grab bars in shower  Bathroom Accessibility: Accessible  Receives Help From: Family  ADL Assistance: [address] [place] [address] Avenue: Independent  Homemaking Responsibilities: Yes  Ambulation

## 2020-09-27 PROBLEM — S72.351A CLOSED DISPLACED COMMINUTED FRACTURE OF SHAFT OF RIGHT FEMUR (HCC): Status: ACTIVE | Noted: 2020-09-22

## 2020-09-27 LAB
GLUCOSE BLD-MCNC: 133 MG/DL (ref 70–99)
GLUCOSE BLD-MCNC: 176 MG/DL (ref 70–99)
GLUCOSE BLD-MCNC: 188 MG/DL (ref 70–99)
GLUCOSE BLD-MCNC: 235 MG/DL (ref 70–99)
HCT VFR BLD CALC: 24.7 % (ref 37–47)
HCT VFR BLD CALC: 25.5 % (ref 37–47)
HEMOGLOBIN: 7.4 GM/DL (ref 12.5–16)
HEMOGLOBIN: 7.8 GM/DL (ref 12.5–16)
MCH RBC QN AUTO: 29.1 PG (ref 27–31)
MCHC RBC AUTO-ENTMCNC: 30.6 % (ref 32–36)
MCV RBC AUTO: 95.1 FL (ref 78–100)
PDW BLD-RTO: 13.4 % (ref 11.7–14.9)
PLATELET # BLD: 264 K/CU MM (ref 140–440)
PMV BLD AUTO: 10.4 FL (ref 7.5–11.1)
RBC # BLD: 2.68 M/CU MM (ref 4.2–5.4)
WBC # BLD: 10.2 K/CU MM (ref 4–10.5)

## 2020-09-27 PROCEDURE — 6360000002 HC RX W HCPCS: Performed by: INTERNAL MEDICINE

## 2020-09-27 PROCEDURE — 85018 HEMOGLOBIN: CPT

## 2020-09-27 PROCEDURE — 6370000000 HC RX 637 (ALT 250 FOR IP): Performed by: INTERNAL MEDICINE

## 2020-09-27 PROCEDURE — 6370000000 HC RX 637 (ALT 250 FOR IP): Performed by: HOSPITALIST

## 2020-09-27 PROCEDURE — 2700000000 HC OXYGEN THERAPY PER DAY

## 2020-09-27 PROCEDURE — 85027 COMPLETE CBC AUTOMATED: CPT

## 2020-09-27 PROCEDURE — 2580000003 HC RX 258: Performed by: ORTHOPAEDIC SURGERY

## 2020-09-27 PROCEDURE — 94761 N-INVAS EAR/PLS OXIMETRY MLT: CPT

## 2020-09-27 PROCEDURE — 36415 COLL VENOUS BLD VENIPUNCTURE: CPT

## 2020-09-27 PROCEDURE — 6370000000 HC RX 637 (ALT 250 FOR IP): Performed by: ORTHOPAEDIC SURGERY

## 2020-09-27 PROCEDURE — 6360000002 HC RX W HCPCS: Performed by: ORTHOPAEDIC SURGERY

## 2020-09-27 PROCEDURE — 85014 HEMATOCRIT: CPT

## 2020-09-27 PROCEDURE — 6360000002 HC RX W HCPCS: Performed by: NURSE PRACTITIONER

## 2020-09-27 PROCEDURE — 94150 VITAL CAPACITY TEST: CPT

## 2020-09-27 PROCEDURE — 82962 GLUCOSE BLOOD TEST: CPT

## 2020-09-27 PROCEDURE — 1200000000 HC SEMI PRIVATE

## 2020-09-27 RX ORDER — CYCLOBENZAPRINE HCL 10 MG
10 TABLET ORAL 3 TIMES DAILY
Status: DISCONTINUED | OUTPATIENT
Start: 2020-09-27 | End: 2020-09-29 | Stop reason: HOSPADM

## 2020-09-27 RX ORDER — LIDOCAINE 4 G/G
1 PATCH TOPICAL DAILY
Status: DISCONTINUED | OUTPATIENT
Start: 2020-09-27 | End: 2020-09-29 | Stop reason: HOSPADM

## 2020-09-27 RX ORDER — OXYCODONE HYDROCHLORIDE AND ACETAMINOPHEN 5; 325 MG/1; MG/1
1 TABLET ORAL EVERY 4 HOURS PRN
Status: DISCONTINUED | OUTPATIENT
Start: 2020-09-27 | End: 2020-09-29 | Stop reason: HOSPADM

## 2020-09-27 RX ORDER — POLYETHYLENE GLYCOL 3350 17 G/17G
17 POWDER, FOR SOLUTION ORAL 2 TIMES DAILY
Status: DISCONTINUED | OUTPATIENT
Start: 2020-09-27 | End: 2020-09-29 | Stop reason: HOSPADM

## 2020-09-27 RX ADMIN — ACETAMINOPHEN 650 MG: 325 TABLET ORAL at 09:57

## 2020-09-27 RX ADMIN — FAMOTIDINE 20 MG: 20 TABLET ORAL at 09:57

## 2020-09-27 RX ADMIN — LOSARTAN POTASSIUM 50 MG: 25 TABLET, FILM COATED ORAL at 09:57

## 2020-09-27 RX ADMIN — CYCLOBENZAPRINE 10 MG: 10 TABLET, FILM COATED ORAL at 22:19

## 2020-09-27 RX ADMIN — POLYETHYLENE GLYCOL (3350) 17 G: 17 POWDER, FOR SOLUTION ORAL at 22:18

## 2020-09-27 RX ADMIN — OXYCODONE HYDROCHLORIDE 10 MG: 10 TABLET ORAL at 08:01

## 2020-09-27 RX ADMIN — DOCUSATE SODIUM 50MG AND SENNOSIDES 8.6MG 1 TABLET: 8.6; 5 TABLET, FILM COATED ORAL at 09:57

## 2020-09-27 RX ADMIN — SODIUM CHLORIDE, PRESERVATIVE FREE 10 ML: 5 INJECTION INTRAVENOUS at 09:56

## 2020-09-27 RX ADMIN — SODIUM CHLORIDE, PRESERVATIVE FREE 10 ML: 5 INJECTION INTRAVENOUS at 22:19

## 2020-09-27 RX ADMIN — DICLOFENAC 2 G: 10 GEL TOPICAL at 11:58

## 2020-09-27 RX ADMIN — ACETAMINOPHEN 650 MG: 325 TABLET ORAL at 22:18

## 2020-09-27 RX ADMIN — OXYCODONE HYDROCHLORIDE 10 MG: 10 TABLET ORAL at 00:12

## 2020-09-27 RX ADMIN — FAMOTIDINE 20 MG: 20 TABLET ORAL at 22:22

## 2020-09-27 RX ADMIN — OXYCODONE HYDROCHLORIDE AND ACETAMINOPHEN 1 TABLET: 5; 325 TABLET ORAL at 13:03

## 2020-09-27 RX ADMIN — HYDROMORPHONE HYDROCHLORIDE 0.5 MG: 1 INJECTION, SOLUTION INTRAMUSCULAR; INTRAVENOUS; SUBCUTANEOUS at 09:56

## 2020-09-27 RX ADMIN — GLIPIZIDE 10 MG: 5 TABLET ORAL at 09:57

## 2020-09-27 RX ADMIN — INSULIN LISPRO 4 UNITS: 100 INJECTION, SOLUTION INTRAVENOUS; SUBCUTANEOUS at 13:03

## 2020-09-27 RX ADMIN — INSULIN GLARGINE 15 UNITS: 100 INJECTION, SOLUTION SUBCUTANEOUS at 22:24

## 2020-09-27 RX ADMIN — ENOXAPARIN SODIUM 40 MG: 40 INJECTION SUBCUTANEOUS at 09:56

## 2020-09-27 RX ADMIN — LOSARTAN POTASSIUM 50 MG: 25 TABLET, FILM COATED ORAL at 22:19

## 2020-09-27 RX ADMIN — HYDROMORPHONE HYDROCHLORIDE 0.5 MG: 1 INJECTION, SOLUTION INTRAMUSCULAR; INTRAVENOUS; SUBCUTANEOUS at 04:11

## 2020-09-27 RX ADMIN — HYDROMORPHONE HYDROCHLORIDE 0.5 MG: 1 INJECTION, SOLUTION INTRAMUSCULAR; INTRAVENOUS; SUBCUTANEOUS at 15:16

## 2020-09-27 RX ADMIN — DOCUSATE SODIUM 50MG AND SENNOSIDES 8.6MG 1 TABLET: 8.6; 5 TABLET, FILM COATED ORAL at 22:18

## 2020-09-27 RX ADMIN — POLYETHYLENE GLYCOL (3350) 17 G: 17 POWDER, FOR SOLUTION ORAL at 09:56

## 2020-09-27 RX ADMIN — DICLOFENAC 2 G: 10 GEL TOPICAL at 22:20

## 2020-09-27 RX ADMIN — ACETAMINOPHEN 650 MG: 325 TABLET ORAL at 04:17

## 2020-09-27 RX ADMIN — ACETAMINOPHEN 650 MG: 325 TABLET ORAL at 16:27

## 2020-09-27 RX ADMIN — CYCLOBENZAPRINE 10 MG: 10 TABLET, FILM COATED ORAL at 13:02

## 2020-09-27 RX ADMIN — INSULIN LISPRO 2 UNITS: 100 INJECTION, SOLUTION INTRAVENOUS; SUBCUTANEOUS at 07:59

## 2020-09-27 ASSESSMENT — PAIN DESCRIPTION - DESCRIPTORS
DESCRIPTORS: ACHING;DISCOMFORT
DESCRIPTORS: ACHING;DISCOMFORT
DESCRIPTORS: ACHING

## 2020-09-27 ASSESSMENT — PAIN SCALES - GENERAL
PAINLEVEL_OUTOF10: 0
PAINLEVEL_OUTOF10: 6
PAINLEVEL_OUTOF10: 8
PAINLEVEL_OUTOF10: 0
PAINLEVEL_OUTOF10: 10
PAINLEVEL_OUTOF10: 8
PAINLEVEL_OUTOF10: 0
PAINLEVEL_OUTOF10: 8
PAINLEVEL_OUTOF10: 7
PAINLEVEL_OUTOF10: 0
PAINLEVEL_OUTOF10: 10
PAINLEVEL_OUTOF10: 4
PAINLEVEL_OUTOF10: 4

## 2020-09-27 ASSESSMENT — PAIN DESCRIPTION - ONSET
ONSET: ON-GOING

## 2020-09-27 ASSESSMENT — PAIN DESCRIPTION - PAIN TYPE
TYPE: SURGICAL PAIN

## 2020-09-27 ASSESSMENT — PAIN DESCRIPTION - LOCATION
LOCATION: HIP
LOCATION: HIP;BACK
LOCATION: HIP;BACK

## 2020-09-27 ASSESSMENT — PAIN DESCRIPTION - FREQUENCY
FREQUENCY: INTERMITTENT

## 2020-09-27 ASSESSMENT — PAIN DESCRIPTION - PROGRESSION
CLINICAL_PROGRESSION: GRADUALLY IMPROVING
CLINICAL_PROGRESSION: GRADUALLY WORSENING

## 2020-09-27 ASSESSMENT — PAIN DESCRIPTION - ORIENTATION
ORIENTATION: RIGHT
ORIENTATION: RIGHT

## 2020-09-27 ASSESSMENT — PAIN - FUNCTIONAL ASSESSMENT
PAIN_FUNCTIONAL_ASSESSMENT: ACTIVITIES ARE NOT PREVENTED
PAIN_FUNCTIONAL_ASSESSMENT: PREVENTS OR INTERFERES SOME ACTIVE ACTIVITIES AND ADLS
PAIN_FUNCTIONAL_ASSESSMENT: PREVENTS OR INTERFERES SOME ACTIVE ACTIVITIES AND ADLS

## 2020-09-27 NOTE — PROGRESS NOTES
Hospitalist Progress Note      Name:  Alon Linares /Age/Sex: 1955  (76 y.o. female)   MRN & CSN:  [id number] & [id number] Admission Date/Time: 2020  6:41 PM   Location:  [address] [place] [address] PCP: Tobias Day: 6    Assessment and Plan:   Alon Linares is a 76 y.o.  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 Femoral fracture, right: Status post open reduction internal fixation. · Pain control, diet per surgery  · Physical therapy and Occupational Therapy recommending skilled nursing facility. · DVT prophylaxis. 2. Acute hypoxic respiratory failure: Could be from atelectasis. · Fever, tachycardia, hypoxemia  · WBC normal, no respiratory symptoms. · Blood culture pending, lactic acid normal.  · Procalcitonin normal.   · Advised incentive spirometry. 3. Low Back Pain: could still be related to the fall  · Lumbar x ray with mild compression of T11 and T12. · Start lidocaine patch, diclofenac gel, Flexeril. 4. Hypertension: Continue losartan. 5. Acute blood loss anemia: Hemoglobin dropped to 6.8 admission. Hemoglobin still low. Awaiting CBC today. 6. Diabetes: Sliding scale hypoglycemia protocol. Hemoglobin A1c 8.2. Resume glipizide. Started on Lantus and Humalog due to uncontrolled BG. Fasting blood sugar 176. Adjust Lantus. Continue Humalog. 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 Pain is well controlled.   No chest pain, shortness of breath, cough. No fever or chills. Ten point ROS reviewed negative, unless as noted above    Objective: Intake/Output Summary (Last 24 hours) at 9/27/2020 1330  Last data filed at 9/27/2020 1244  Gross per 24 hour   Intake 1194.17 ml   Output 1450 ml   Net -255.83 ml      Vitals:   Vitals:    09/27/20 0930   BP: (!) 126/54   Pulse: 86   Resp: 16   Temp: 98.1 °F (36.7 °C)   SpO2: 99%     Physical Exam:   GEN Awake female, sitting upright in bed in no apparent distress. Appears given age. EYES Pupils are equally round. No scleral erythema, discharge, or conjunctivitis. HENT Mucous membranes are moist. Oral pharynx without exudates, no evidence of thrush. NECK Supple, no apparent thyromegaly or masses. RESP Clear to auscultation, no wheezes, rales or rhonchi. Symmetric chest movement while on room air. CARDIO/VASC S1/S2 auscultated. Regular rate without appreciable murmurs, rubs, or gallops. No JVD or carotid bruits. Peripheral pulses equal bilaterally and palpable. No peripheral edema. GI Abdomen is soft without significant tenderness, masses, or guarding. Bowel sounds are normoactive. Rectal exam deferred. MSK No gross joint deformities. Right thigh with swelling. SKIN Normal coloration, warm, dry. NEURO Cranial nerves appear grossly intact, normal speech, no lateralizing weakness. No sensory deficit. PSYCH Awake, alert, oriented x 4. Affect appropriate.     Medications:   Medications:    polyethylene glycol  17 g Oral BID    lidocaine  1 patch Transdermal Daily    diclofenac sodium  2 g Topical BID    cyclobenzaprine  10 mg Oral TID    insulin glargine  15 Units Subcutaneous Nightly    insulin lispro  5 Units Subcutaneous TID WC    sodium chloride flush  10 mL Intravenous 2 times per day    sennosides-docusate sodium  1 tablet Oral BID    acetaminophen  650 mg Oral Q6H    enoxaparin  40 mg Subcutaneous Daily    glipiZIDE  10 mg Oral Daily    famotidine  20 mg Oral BID    insulin lispro  0-12 Units Subcutaneous TID WC    insulin lispro  0-6 Units Subcutaneous Nightly    losartan  50 mg Oral BID      Infusions:    dextrose      dextrose       PRN Meds: HYDROmorphone, 0.5 mg, Q6H PRN  oxyCODONE-acetaminophen, 1 tablet, Q4H PRN  sodium chloride flush, 10 mL, PRN  magnesium hydroxide, 30 mL, Daily PRN  glucose, 15 g, PRN  dextrose, 12.5 g, PRN  glucagon (rDNA), 1 mg, PRN  dextrose, 100 mL/hr, PRN  polyethylene glycol, 17 g, Daily PRN  promethazine, 12.5 mg, Q6H PRN    Or  ondansetron, 4 mg, Q6H PRN  potassium chloride, 40 mEq, PRN    Or  potassium alternative oral replacement, 40 mEq, PRN    Or  potassium chloride, 10 mEq, PRN  magnesium sulfate, 2 g, PRN  dextrose, 100 mL/hr, PRN      Recent Labs     09/25/20  0538  09/26/20  0902 09/26/20 2027 09/27/20  0203   WBC 11.4*  --  10.8*  --   --    HGB 7.4*   < > 6.8* 7.4* 7.4*   HCT 24.2*   < > 22.9* 23.6* 24.7*     --  215  --   --     < > = values in this interval not displayed.       Imaging reviewed    Electronically signed by Kirstin Reyna MD on 9/27/2020 at 1:30 PM

## 2020-09-27 NOTE — PROGRESS NOTES
Night Shift RN Notes:  Patient ambulated from bed to chair to room and bathroom. Sat on the chair for approximately 2 hours. 09/26/20 2350   Mobility   Activity Up to chair; Ambulate in room; Return to bed   Level of Assistance Moderate assist, patient does 50-74%   Assistive Device Front wheel walker;Gait belt   Distance Ambulated (ft) 50 ft   Ambulation Response Tolerated fairly well     This RN set this patient for breakfast on her chair ready for ambulation after breakfast as agreed. AM shift RN Henrry Conner was informed in the report.

## 2020-09-28 LAB
ABO/RH: NORMAL
ANTIBODY SCREEN: NEGATIVE
COMPONENT: NORMAL
CROSSMATCH RESULT: NORMAL
GLUCOSE BLD-MCNC: 168 MG/DL (ref 70–99)
GLUCOSE BLD-MCNC: 169 MG/DL (ref 70–99)
GLUCOSE BLD-MCNC: 185 MG/DL (ref 70–99)
GLUCOSE BLD-MCNC: 201 MG/DL (ref 70–99)
STATUS: NORMAL
TRANSFUSION STATUS: NORMAL
UNIT DIVISION: 0
UNIT NUMBER: NORMAL

## 2020-09-28 PROCEDURE — 2580000003 HC RX 258: Performed by: ORTHOPAEDIC SURGERY

## 2020-09-28 PROCEDURE — 82962 GLUCOSE BLOOD TEST: CPT

## 2020-09-28 PROCEDURE — 6370000000 HC RX 637 (ALT 250 FOR IP): Performed by: INTERNAL MEDICINE

## 2020-09-28 PROCEDURE — 6370000000 HC RX 637 (ALT 250 FOR IP): Performed by: HOSPITALIST

## 2020-09-28 PROCEDURE — 97110 THERAPEUTIC EXERCISES: CPT

## 2020-09-28 PROCEDURE — 1200000000 HC SEMI PRIVATE

## 2020-09-28 PROCEDURE — 6360000002 HC RX W HCPCS: Performed by: ORTHOPAEDIC SURGERY

## 2020-09-28 PROCEDURE — 6370000000 HC RX 637 (ALT 250 FOR IP): Performed by: ORTHOPAEDIC SURGERY

## 2020-09-28 PROCEDURE — 97116 GAIT TRAINING THERAPY: CPT

## 2020-09-28 PROCEDURE — 97530 THERAPEUTIC ACTIVITIES: CPT

## 2020-09-28 RX ORDER — INSULIN GLARGINE 100 [IU]/ML
20 INJECTION, SOLUTION SUBCUTANEOUS NIGHTLY
Status: DISCONTINUED | OUTPATIENT
Start: 2020-09-28 | End: 2020-09-29 | Stop reason: HOSPADM

## 2020-09-28 RX ADMIN — DOCUSATE SODIUM 50MG AND SENNOSIDES 8.6MG 1 TABLET: 8.6; 5 TABLET, FILM COATED ORAL at 20:36

## 2020-09-28 RX ADMIN — ACETAMINOPHEN 650 MG: 325 TABLET ORAL at 11:17

## 2020-09-28 RX ADMIN — OXYCODONE HYDROCHLORIDE AND ACETAMINOPHEN 1 TABLET: 5; 325 TABLET ORAL at 08:23

## 2020-09-28 RX ADMIN — SODIUM CHLORIDE, PRESERVATIVE FREE 10 ML: 5 INJECTION INTRAVENOUS at 08:40

## 2020-09-28 RX ADMIN — LOSARTAN POTASSIUM 50 MG: 25 TABLET, FILM COATED ORAL at 08:39

## 2020-09-28 RX ADMIN — ACETAMINOPHEN 650 MG: 325 TABLET ORAL at 06:13

## 2020-09-28 RX ADMIN — CYCLOBENZAPRINE 10 MG: 10 TABLET, FILM COATED ORAL at 08:36

## 2020-09-28 RX ADMIN — DOCUSATE SODIUM 50MG AND SENNOSIDES 8.6MG 1 TABLET: 8.6; 5 TABLET, FILM COATED ORAL at 08:39

## 2020-09-28 RX ADMIN — FAMOTIDINE 20 MG: 20 TABLET ORAL at 08:36

## 2020-09-28 RX ADMIN — LOSARTAN POTASSIUM 50 MG: 25 TABLET, FILM COATED ORAL at 20:36

## 2020-09-28 RX ADMIN — OXYCODONE HYDROCHLORIDE AND ACETAMINOPHEN 1 TABLET: 5; 325 TABLET ORAL at 20:36

## 2020-09-28 RX ADMIN — ACETAMINOPHEN 650 MG: 325 TABLET ORAL at 16:39

## 2020-09-28 RX ADMIN — FAMOTIDINE 20 MG: 20 TABLET ORAL at 20:36

## 2020-09-28 RX ADMIN — ENOXAPARIN SODIUM 40 MG: 40 INJECTION SUBCUTANEOUS at 08:40

## 2020-09-28 RX ADMIN — INSULIN LISPRO 2 UNITS: 100 INJECTION, SOLUTION INTRAVENOUS; SUBCUTANEOUS at 17:27

## 2020-09-28 RX ADMIN — INSULIN GLARGINE 20 UNITS: 100 INJECTION, SOLUTION SUBCUTANEOUS at 20:42

## 2020-09-28 RX ADMIN — DICLOFENAC 2 G: 10 GEL TOPICAL at 08:51

## 2020-09-28 RX ADMIN — INSULIN LISPRO 2 UNITS: 100 INJECTION, SOLUTION INTRAVENOUS; SUBCUTANEOUS at 08:26

## 2020-09-28 RX ADMIN — OXYCODONE HYDROCHLORIDE AND ACETAMINOPHEN 1 TABLET: 5; 325 TABLET ORAL at 12:44

## 2020-09-28 RX ADMIN — ACETAMINOPHEN 650 MG: 325 TABLET ORAL at 22:47

## 2020-09-28 RX ADMIN — POLYETHYLENE GLYCOL (3350) 17 G: 17 POWDER, FOR SOLUTION ORAL at 08:39

## 2020-09-28 RX ADMIN — INSULIN LISPRO 2 UNITS: 100 INJECTION, SOLUTION INTRAVENOUS; SUBCUTANEOUS at 12:06

## 2020-09-28 RX ADMIN — GLIPIZIDE 10 MG: 5 TABLET ORAL at 08:37

## 2020-09-28 RX ADMIN — CYCLOBENZAPRINE 10 MG: 10 TABLET, FILM COATED ORAL at 20:36

## 2020-09-28 RX ADMIN — CYCLOBENZAPRINE 10 MG: 10 TABLET, FILM COATED ORAL at 14:31

## 2020-09-28 RX ADMIN — SODIUM CHLORIDE, PRESERVATIVE FREE 10 ML: 5 INJECTION INTRAVENOUS at 20:37

## 2020-09-28 RX ADMIN — DICLOFENAC 2 G: 10 GEL TOPICAL at 20:38

## 2020-09-28 ASSESSMENT — PAIN DESCRIPTION - ORIENTATION
ORIENTATION: LOWER
ORIENTATION: MID;LOWER
ORIENTATION: LOWER

## 2020-09-28 ASSESSMENT — PAIN SCALES - GENERAL
PAINLEVEL_OUTOF10: 0
PAINLEVEL_OUTOF10: 5
PAINLEVEL_OUTOF10: 5
PAINLEVEL_OUTOF10: 10
PAINLEVEL_OUTOF10: 6
PAINLEVEL_OUTOF10: 10
PAINLEVEL_OUTOF10: 7
PAINLEVEL_OUTOF10: 6
PAINLEVEL_OUTOF10: 0
PAINLEVEL_OUTOF10: 0
PAINLEVEL_OUTOF10: 5
PAINLEVEL_OUTOF10: 7
PAINLEVEL_OUTOF10: 7
PAINLEVEL_OUTOF10: 10
PAINLEVEL_OUTOF10: 5
PAINLEVEL_OUTOF10: 10

## 2020-09-28 ASSESSMENT — PAIN DESCRIPTION - DESCRIPTORS
DESCRIPTORS: ACHING
DESCRIPTORS: ACHING
DESCRIPTORS: DISCOMFORT
DESCRIPTORS: DISCOMFORT
DESCRIPTORS: ACHING
DESCRIPTORS: ACHING

## 2020-09-28 ASSESSMENT — PAIN - FUNCTIONAL ASSESSMENT
PAIN_FUNCTIONAL_ASSESSMENT: ACTIVITIES ARE NOT PREVENTED

## 2020-09-28 ASSESSMENT — PAIN DESCRIPTION - LOCATION
LOCATION: BACK
LOCATION: HIP;BACK
LOCATION: BACK

## 2020-09-28 ASSESSMENT — PAIN DESCRIPTION - PAIN TYPE
TYPE: CHRONIC PAIN
TYPE: CHRONIC PAIN
TYPE: SURGICAL PAIN
TYPE: CHRONIC PAIN

## 2020-09-28 ASSESSMENT — PAIN DESCRIPTION - ONSET
ONSET: ON-GOING
ONSET: GRADUAL
ONSET: ON-GOING

## 2020-09-28 ASSESSMENT — PAIN DESCRIPTION - FREQUENCY
FREQUENCY: INTERMITTENT
FREQUENCY: CONTINUOUS
FREQUENCY: CONTINUOUS
FREQUENCY: INTERMITTENT

## 2020-09-28 ASSESSMENT — PAIN SCALES - WONG BAKER: WONGBAKER_NUMERICALRESPONSE: 0

## 2020-09-28 ASSESSMENT — PAIN DESCRIPTION - PROGRESSION
CLINICAL_PROGRESSION: GRADUALLY WORSENING
CLINICAL_PROGRESSION: GRADUALLY WORSENING
CLINICAL_PROGRESSION: NOT CHANGED

## 2020-09-28 ASSESSMENT — PAIN DESCRIPTION - DIRECTION: RADIATING_TOWARDS: DOWN

## 2020-09-28 NOTE — CARE COORDINATION
Chart reviewed. Pt discharge plan is to the Swing Bed in [place]. Call to [facility]Swing bed, precert was initiated on Friday 9/25/2020. Call to Pt in the room. Pt plan continues to be discharge to the Swing Bed.     precert is still pending. Electronically signed by ODALYS Aparicio on 9/28/2020 at 10:31 AM     10:41a. Message from [address] [place] [address] [address]. Pt precert has been approved for admittance for today or tomorrow. If Pt does not admit by tomorrow, Pt will need a new precert.  Odalys provided this information to the hospitlist.

## 2020-09-28 NOTE — PROGRESS NOTES
Physical Therapy    Physical Therapy Treatment Note  Name: Stacie Javier MRN: [id number] :   1955   Date:  2020   Admission Date: 2020 Room:  [address] [place] [address]   Restrictions/Precautions:        TTWB on right LE as well as spinal precautions.   Communication with other providers:  Per nurse ok to tx  Subjective:  Patient states:  Pt motivated and agreeable to tx  Pain:   Location, Type, Intensity (0/10 to 10/10):  Rated hip and back 8 at start of tx but 7 at end of tx. Objective:    Observation:  Alert and oriented  Treatment, including education/measures:  Ex in long sitting with leg  assist;  Trunk stretches with shoulder flex and cues for deep breathing  20 reps aps  10 reps x 2 quad sets  10 reps x 2 glut sets  10 reps x 2 heel slides AAROM  10 reps x 2 abd/add AAROM  Sit<=>stand min assist and cues  amb with rw 6' TTWB RLE min assist and cues needing increased time and effort and followed with chair for safety  Safety  Patient left safely in the chair, with call light/phone in reach . Gait belt was used for transfers and gait. nurse aid in room to give bath  Assessment / Impression:       Patient's tolerance of treatment:  good   Adverse Reaction: na  Significant change in status and impact:  na  Barriers to improvement:  Strength and safety  Plan for Next Session:    Cont.  POC  Time in:  0950  Time out:  1045  Timed treatment minutes:  55  Total treatment time:  54    Previously filed items:  Social/Functional History  Lives With: Alone  Type of Home: Apartment  Home Layout: One level  Home Access: Level entry  [place] Equipment: Grab bars in shower  Bathroom Accessibility: Accessible  Receives Help From: Family  ADL Assistance: Independent  Homemaking Assistance: Independent  Homemaking Responsibilities: Yes  Ambulation Assistance: Independent  Transfer Assistance: Independent  Active : Yes  Occupation: Retired  Short term goals  Time Frame for [employer] term goals: 1 week  Short term goal 1: Pt will perform rolling Yosvany  Short term goal 2: Pt will transition sit><supine maxA  Short term goal 3: Pt will sit and perform light dynamic activity x 5 minutes SBA, single UE support  Short term goal 4: Pt will transfer between surfaces maxA x 2 while maintainin TTWB RLE       Electronically signed by:    Eric Garner PTA  9/28/2020, 8:12 AM

## 2020-09-28 NOTE — PLAN OF CARE
Problem: Pain:  Description: Pain management should include both nonpharmacologic and pharmacologic interventions.   Goal: Pain level will decrease  Description: Pain level will decrease  Outcome: Ongoing  Goal: Control of acute pain  Description: Control of acute pain  Outcome: Ongoing  Goal: Control of chronic pain  Description: Control of chronic pain  Outcome: Ongoing  Goal: Patient's pain/discomfort is manageable  Description: Patient's pain/discomfort is manageable  Outcome: Ongoing

## 2020-09-28 NOTE — PROGRESS NOTES
Hospitalist Progress Note      Name:  Stacie Javier /Age/Sex: 1955  (76 y.o. female)   MRN & CSN:  [id number] & [id number] Admission Date/Time: 2020  6:41 PM   Location:  [address] [place] [address] PCP: Tobias Day: 7    Assessment and Plan:   Stacie Javier is a 76 y.o.  female  who presents with Closed displaced comminuted fracture of shaft of right femur ([facility]    1. Femoral fracture, right: Status post open reduction internal fixation. · Pain control, diet per surgery  · Physical therapy and Occupational Therapy recommending skilled nursing facility. · DVT prophylaxis with Eliquis    2. Acute hypoxic respiratory failure: Could be from atelectasis. Resolving  · Fever, tachycardia, hypoxemia  · WBC normal, no respiratory symptoms. · Blood culture pending, lactic acid normal.  · Procalcitonin normal.   · Advised incentive spirometry. 3. Low Back Pain: could still be related to the fall. Resolving  · Lumbar x ray with mild compression of T11 and T12. · Start lidocaine patch, diclofenac gel, Flexeril. 4. Hypertension: Continue losartan. 5. Acute blood loss anemia: Hemoglobin dropped to 6.8 admission. Hemoglobin still low. Hemoglobin stable. Will check in the morning. 6. Diabetes: Sliding scale hypoglycemia protocol. Hemoglobin A1c 8.2. Resume glipizide. Started on Lantus and Humalog due to uncontrolled BG. Fasting blood sugar 185. Lantus adjusted. Diet Dietary Nutrition Supplements: Standard High Calorie Oral Supplement  DIET CARB CONTROL; Carb Control: 4 carb choices (60 gms)/meal   DVT Prophylaxis [x] Lovenox, []  Heparin, [] SCDs, [] Warfarin  [] NOAC     GI Prophylaxis [] PPI,  [] H2 Blocker,  [] Carafate,  [x] Diet/Tube Feeds   Code Status Full Code   MDM [] Low, [x] Moderate,[]  High     History of Present Illness:     Chief Complaint: Closed displaced comminuted fracture of shaft of right femur ([facility]    Seen and examined today. Feels much better.   No glipiZIDE  10 mg Oral Daily    famotidine  20 mg Oral BID    insulin lispro  0-12 Units Subcutaneous TID WC    insulin lispro  0-6 Units Subcutaneous Nightly    losartan  50 mg Oral BID      Infusions:    dextrose      dextrose       PRN Meds: HYDROmorphone, 0.5 mg, Q6H PRN  oxyCODONE-acetaminophen, 1 tablet, Q4H PRN  sodium chloride flush, 10 mL, PRN  magnesium hydroxide, 30 mL, Daily PRN  glucose, 15 g, PRN  dextrose, 12.5 g, PRN  glucagon (rDNA), 1 mg, PRN  dextrose, 100 mL/hr, PRN  polyethylene glycol, 17 g, Daily PRN  promethazine, 12.5 mg, Q6H PRN    Or  ondansetron, 4 mg, Q6H PRN  potassium chloride, 40 mEq, PRN    Or  potassium alternative oral replacement, 40 mEq, PRN    Or  potassium chloride, 10 mEq, PRN  magnesium sulfate, 2 g, PRN  dextrose, 100 mL/hr, PRN      Recent Labs     09/26/20  0902 09/26/20 2027 09/27/20  0203 09/27/20  1536   WBC 10.8*  --   --  10.2   HGB 6.8* 7.4* 7.4* 7.8*   HCT 22.9* 23.6* 24.7* 25.5*     --   --  264      Imaging reviewed    Electronically signed by Stanley Coppola MD on 9/28/2020 at 1:49 PM

## 2020-09-28 NOTE — PROGRESS NOTES
losartan  50 mg Oral BID       Assessment and Plan       Impression:   1. POD#5 Right femur IM nail for comminuted femur fracture (9/23/20)   2. Diabetes   3. Back pain S/P fall    Plan:  1:  Mobilize with Physical therapy   -TTWB   2.  Acute blood loss anemia, not a complication   -TG-0.3   -Repeat in am   -S/P PRBC  3:  Continue Deep venous thrombosis prophylaxis    -Chemoprophylaxis Lovenox   -Mechanical-SIDNEY hose, -SCD's, ambulation  4:  Back pain   -No acute lumbar fracture   -Mild compression deformities of T11 and T12  5. Continue Pain Control  6.    D/C Planning:     -Likely home with home [address] on swing bed   -FU in 4 weeks, DC instructions in chart     Lexa Espinoza MD

## 2020-09-29 ENCOUNTER — HOSPITAL ENCOUNTER (INPATIENT)
Age: 65
LOS: 17 days | Discharge: HOME HEALTH | DRG: 560 | End: 2020-10-16
Attending: INTERNAL MEDICINE | Admitting: INTERNAL MEDICINE
Payer: COMMERCIAL

## 2020-09-29 VITALS
OXYGEN SATURATION: 90 % | HEART RATE: 95 BPM | RESPIRATION RATE: 16 BRPM | DIASTOLIC BLOOD PRESSURE: 72 MMHG | TEMPERATURE: 98.1 F | SYSTOLIC BLOOD PRESSURE: 161 MMHG | HEIGHT: 68 IN | WEIGHT: 236.4 LBS | BODY MASS INDEX: 35.83 KG/M2

## 2020-09-29 PROBLEM — R53.81 DEBILITY: Status: ACTIVE | Noted: 2020-09-29

## 2020-09-29 LAB
ANION GAP SERPL CALCULATED.3IONS-SCNC: 9 MMOL/L (ref 4–16)
BASOPHILS ABSOLUTE: 0 K/CU MM
BASOPHILS RELATIVE PERCENT: 0.5 % (ref 0–1)
BUN BLDV-MCNC: 14 MG/DL (ref 6–23)
CALCIUM SERPL-MCNC: 8.8 MG/DL (ref 8.3–10.6)
CHLORIDE BLD-SCNC: 99 MMOL/L (ref 99–110)
CO2: 30 MMOL/L (ref 21–32)
CREAT SERPL-MCNC: 0.7 MG/DL (ref 0.6–1.1)
DIFFERENTIAL TYPE: ABNORMAL
EOSINOPHILS ABSOLUTE: 0.2 K/CU MM
EOSINOPHILS RELATIVE PERCENT: 2.5 % (ref 0–3)
GFR AFRICAN AMERICAN: >60 ML/MIN/1.73M2
GFR NON-AFRICAN AMERICAN: >60 ML/MIN/1.73M2
GLUCOSE BLD-MCNC: 112 MG/DL (ref 70–99)
GLUCOSE BLD-MCNC: 147 MG/DL (ref 70–99)
GLUCOSE BLD-MCNC: 151 MG/DL (ref 70–99)
GLUCOSE BLD-MCNC: 220 MG/DL (ref 70–99)
GLUCOSE BLD-MCNC: 221 MG/DL (ref 70–99)
GLUCOSE BLD-MCNC: 83 MG/DL (ref 70–99)
HCT VFR BLD CALC: 26.5 % (ref 37–47)
HEMOGLOBIN: 8.2 GM/DL (ref 12.5–16)
IMMATURE NEUTROPHIL %: 2.4 % (ref 0–0.43)
LYMPHOCYTES ABSOLUTE: 2 K/CU MM
LYMPHOCYTES RELATIVE PERCENT: 22.6 % (ref 24–44)
MCH RBC QN AUTO: 29.5 PG (ref 27–31)
MCHC RBC AUTO-ENTMCNC: 30.9 % (ref 32–36)
MCV RBC AUTO: 95.3 FL (ref 78–100)
MONOCYTES ABSOLUTE: 0.6 K/CU MM
MONOCYTES RELATIVE PERCENT: 7.2 % (ref 0–4)
NUCLEATED RBC %: 0.3 %
PDW BLD-RTO: 13.9 % (ref 11.7–14.9)
PLATELET # BLD: 383 K/CU MM (ref 140–440)
PMV BLD AUTO: 10 FL (ref 7.5–11.1)
POTASSIUM SERPL-SCNC: 4 MMOL/L (ref 3.5–5.1)
RBC # BLD: 2.78 M/CU MM (ref 4.2–5.4)
SEGMENTED NEUTROPHILS ABSOLUTE COUNT: 5.8 K/CU MM
SEGMENTED NEUTROPHILS RELATIVE PERCENT: 64.8 % (ref 36–66)
SODIUM BLD-SCNC: 138 MMOL/L (ref 135–145)
TOTAL IMMATURE NEUTOROPHIL: 0.21 K/CU MM
TOTAL NUCLEATED RBC: 0 K/CU MM
WBC # BLD: 8.9 K/CU MM (ref 4–10.5)

## 2020-09-29 PROCEDURE — 6370000000 HC RX 637 (ALT 250 FOR IP): Performed by: INTERNAL MEDICINE

## 2020-09-29 PROCEDURE — 82962 GLUCOSE BLOOD TEST: CPT

## 2020-09-29 PROCEDURE — 36415 COLL VENOUS BLD VENIPUNCTURE: CPT

## 2020-09-29 PROCEDURE — 1200000002 HC SEMI PRIVATE SWING BED

## 2020-09-29 PROCEDURE — 85025 COMPLETE CBC W/AUTO DIFF WBC: CPT

## 2020-09-29 PROCEDURE — 6370000000 HC RX 637 (ALT 250 FOR IP): Performed by: ORTHOPAEDIC SURGERY

## 2020-09-29 PROCEDURE — 6360000002 HC RX W HCPCS: Performed by: ORTHOPAEDIC SURGERY

## 2020-09-29 PROCEDURE — 80048 BASIC METABOLIC PNL TOTAL CA: CPT

## 2020-09-29 PROCEDURE — 6370000000 HC RX 637 (ALT 250 FOR IP): Performed by: HOSPITALIST

## 2020-09-29 RX ORDER — LIDOCAINE 4 G/G
1 PATCH TOPICAL DAILY
Status: CANCELLED | OUTPATIENT
Start: 2020-09-30

## 2020-09-29 RX ORDER — POTASSIUM CHLORIDE 20 MEQ/1
40 TABLET, EXTENDED RELEASE ORAL PRN
Status: CANCELLED | OUTPATIENT
Start: 2020-09-29

## 2020-09-29 RX ORDER — DEXTROSE MONOHYDRATE 50 MG/ML
100 INJECTION, SOLUTION INTRAVENOUS PRN
Status: DISCONTINUED | OUTPATIENT
Start: 2020-09-29 | End: 2020-09-29 | Stop reason: SDUPTHER

## 2020-09-29 RX ORDER — LOSARTAN POTASSIUM 25 MG/1
50 TABLET ORAL 2 TIMES DAILY
Status: CANCELLED | OUTPATIENT
Start: 2020-09-29

## 2020-09-29 RX ORDER — SODIUM CHLORIDE 0.9 % (FLUSH) 0.9 %
10 SYRINGE (ML) INJECTION EVERY 12 HOURS SCHEDULED
Status: DISCONTINUED | OUTPATIENT
Start: 2020-09-30 | End: 2020-10-01

## 2020-09-29 RX ORDER — ONDANSETRON 2 MG/ML
4 INJECTION INTRAMUSCULAR; INTRAVENOUS EVERY 6 HOURS PRN
Status: DISCONTINUED | OUTPATIENT
Start: 2020-09-29 | End: 2020-10-16 | Stop reason: HOSPADM

## 2020-09-29 RX ORDER — LOSARTAN POTASSIUM 50 MG/1
50 TABLET ORAL 2 TIMES DAILY
Status: CANCELLED | OUTPATIENT
Start: 2020-09-29

## 2020-09-29 RX ORDER — SENNA AND DOCUSATE SODIUM 50; 8.6 MG/1; MG/1
1 TABLET, FILM COATED ORAL 2 TIMES DAILY
Status: DISCONTINUED | OUTPATIENT
Start: 2020-09-29 | End: 2020-10-16 | Stop reason: HOSPADM

## 2020-09-29 RX ORDER — DEXTROSE MONOHYDRATE 50 MG/ML
100 INJECTION, SOLUTION INTRAVENOUS PRN
Status: CANCELLED | OUTPATIENT
Start: 2020-09-29

## 2020-09-29 RX ORDER — NICOTINE POLACRILEX 4 MG
15 LOZENGE BUCCAL PRN
Status: CANCELLED | OUTPATIENT
Start: 2020-09-29

## 2020-09-29 RX ORDER — SODIUM CHLORIDE 0.9 % (FLUSH) 0.9 %
10 SYRINGE (ML) INJECTION EVERY 12 HOURS SCHEDULED
Status: CANCELLED | OUTPATIENT
Start: 2020-09-29

## 2020-09-29 RX ORDER — DEXTROSE MONOHYDRATE 25 G/50ML
12.5 INJECTION, SOLUTION INTRAVENOUS PRN
Status: CANCELLED | OUTPATIENT
Start: 2020-09-29

## 2020-09-29 RX ORDER — POTASSIUM CHLORIDE 7.45 MG/ML
10 INJECTION INTRAVENOUS PRN
Status: CANCELLED | OUTPATIENT
Start: 2020-09-29

## 2020-09-29 RX ORDER — DEXTROSE MONOHYDRATE 50 MG/ML
1000 INJECTION, SOLUTION INTRAVENOUS PRN
Status: CANCELLED | OUTPATIENT
Start: 2020-09-29

## 2020-09-29 RX ORDER — OXYCODONE HYDROCHLORIDE AND ACETAMINOPHEN 5; 325 MG/1; MG/1
1 TABLET ORAL EVERY 4 HOURS PRN
Status: CANCELLED | OUTPATIENT
Start: 2020-09-29

## 2020-09-29 RX ORDER — POLYETHYLENE GLYCOL 3350 17 G/17G
17 POWDER, FOR SOLUTION ORAL 2 TIMES DAILY
Status: CANCELLED | OUTPATIENT
Start: 2020-09-29

## 2020-09-29 RX ORDER — INSULIN GLARGINE 100 [IU]/ML
20 INJECTION, SOLUTION SUBCUTANEOUS NIGHTLY
Status: CANCELLED | OUTPATIENT
Start: 2020-09-29

## 2020-09-29 RX ORDER — FAMOTIDINE 20 MG/1
20 TABLET, FILM COATED ORAL 2 TIMES DAILY
Status: DISCONTINUED | OUTPATIENT
Start: 2020-09-29 | End: 2020-10-16 | Stop reason: HOSPADM

## 2020-09-29 RX ORDER — SENNA AND DOCUSATE SODIUM 50; 8.6 MG/1; MG/1
1 TABLET, FILM COATED ORAL 2 TIMES DAILY
Status: CANCELLED | OUTPATIENT
Start: 2020-09-29

## 2020-09-29 RX ORDER — SODIUM CHLORIDE 0.9 % (FLUSH) 0.9 %
10 SYRINGE (ML) INJECTION EVERY 12 HOURS SCHEDULED
Status: CANCELLED | OUTPATIENT
Start: 2020-09-30

## 2020-09-29 RX ORDER — NICOTINE POLACRILEX 4 MG
15 LOZENGE BUCCAL PRN
Status: DISCONTINUED | OUTPATIENT
Start: 2020-09-29 | End: 2020-10-16 | Stop reason: HOSPADM

## 2020-09-29 RX ORDER — DEXTROSE MONOHYDRATE 50 MG/ML
1000 INJECTION, SOLUTION INTRAVENOUS PRN
Status: DISCONTINUED | OUTPATIENT
Start: 2020-09-29 | End: 2020-10-16 | Stop reason: HOSPADM

## 2020-09-29 RX ORDER — ACETAMINOPHEN 325 MG/1
650 TABLET ORAL EVERY 6 HOURS
Status: CANCELLED | OUTPATIENT
Start: 2020-09-29

## 2020-09-29 RX ORDER — FAMOTIDINE 20 MG/1
20 TABLET, FILM COATED ORAL 2 TIMES DAILY
Status: CANCELLED | OUTPATIENT
Start: 2020-09-29

## 2020-09-29 RX ORDER — ACETAMINOPHEN 325 MG/1
650 TABLET ORAL EVERY 6 HOURS
Status: DISCONTINUED | OUTPATIENT
Start: 2020-09-29 | End: 2020-10-16 | Stop reason: HOSPADM

## 2020-09-29 RX ORDER — GLIPIZIDE 10 MG/1
10 TABLET ORAL
Status: CANCELLED | OUTPATIENT
Start: 2020-09-30

## 2020-09-29 RX ORDER — ONDANSETRON 2 MG/ML
4 INJECTION INTRAMUSCULAR; INTRAVENOUS EVERY 6 HOURS PRN
Status: CANCELLED | OUTPATIENT
Start: 2020-09-29

## 2020-09-29 RX ORDER — CYCLOBENZAPRINE HCL 10 MG
10 TABLET ORAL 3 TIMES DAILY
Status: DISCONTINUED | OUTPATIENT
Start: 2020-09-29 | End: 2020-10-16 | Stop reason: HOSPADM

## 2020-09-29 RX ORDER — CYCLOBENZAPRINE HCL 10 MG
10 TABLET ORAL 3 TIMES DAILY
Status: CANCELLED | OUTPATIENT
Start: 2020-09-29

## 2020-09-29 RX ORDER — MAGNESIUM SULFATE IN WATER 40 MG/ML
2 INJECTION, SOLUTION INTRAVENOUS PRN
Status: CANCELLED | OUTPATIENT
Start: 2020-09-29

## 2020-09-29 RX ORDER — GLIPIZIDE 5 MG/1
10 TABLET ORAL DAILY
Status: CANCELLED | OUTPATIENT
Start: 2020-09-30

## 2020-09-29 RX ORDER — GLIPIZIDE 10 MG/1
10 TABLET, FILM COATED, EXTENDED RELEASE ORAL
Status: DISCONTINUED | OUTPATIENT
Start: 2020-09-30 | End: 2020-10-16 | Stop reason: HOSPADM

## 2020-09-29 RX ORDER — PROMETHAZINE HYDROCHLORIDE 25 MG/1
12.5 TABLET ORAL EVERY 6 HOURS PRN
Status: CANCELLED | OUTPATIENT
Start: 2020-09-29

## 2020-09-29 RX ORDER — DEXTROSE MONOHYDRATE 25 G/50ML
12.5 INJECTION, SOLUTION INTRAVENOUS PRN
Status: DISCONTINUED | OUTPATIENT
Start: 2020-09-29 | End: 2020-10-16 | Stop reason: HOSPADM

## 2020-09-29 RX ORDER — PROMETHAZINE HYDROCHLORIDE 12.5 MG/1
12.5 TABLET ORAL EVERY 6 HOURS PRN
Status: DISCONTINUED | OUTPATIENT
Start: 2020-09-29 | End: 2020-10-16 | Stop reason: HOSPADM

## 2020-09-29 RX ORDER — LIDOCAINE 4 G/G
1 PATCH TOPICAL DAILY
Status: DISCONTINUED | OUTPATIENT
Start: 2020-09-30 | End: 2020-10-16 | Stop reason: HOSPADM

## 2020-09-29 RX ORDER — SODIUM CHLORIDE 0.9 % (FLUSH) 0.9 %
10 SYRINGE (ML) INJECTION PRN
Status: CANCELLED | OUTPATIENT
Start: 2020-09-29

## 2020-09-29 RX ORDER — GLIPIZIDE 10 MG/1
10 TABLET, FILM COATED, EXTENDED RELEASE ORAL
COMMUNITY
Start: 2020-08-26

## 2020-09-29 RX ORDER — OXYCODONE HYDROCHLORIDE AND ACETAMINOPHEN 5; 325 MG/1; MG/1
1 TABLET ORAL EVERY 4 HOURS PRN
Status: DISCONTINUED | OUTPATIENT
Start: 2020-09-29 | End: 2020-10-03

## 2020-09-29 RX ORDER — POTASSIUM CHLORIDE 7.45 MG/ML
10 INJECTION INTRAVENOUS PRN
Status: DISCONTINUED | OUTPATIENT
Start: 2020-09-29 | End: 2020-10-16 | Stop reason: HOSPADM

## 2020-09-29 RX ORDER — SODIUM CHLORIDE 0.9 % (FLUSH) 0.9 %
10 SYRINGE (ML) INJECTION PRN
Status: DISCONTINUED | OUTPATIENT
Start: 2020-09-29 | End: 2020-10-16 | Stop reason: HOSPADM

## 2020-09-29 RX ORDER — POLYETHYLENE GLYCOL 3350 17 G/17G
17 POWDER, FOR SOLUTION ORAL 2 TIMES DAILY
Status: DISCONTINUED | OUTPATIENT
Start: 2020-09-29 | End: 2020-10-16 | Stop reason: HOSPADM

## 2020-09-29 RX ORDER — MAGNESIUM SULFATE IN WATER 40 MG/ML
2 INJECTION, SOLUTION INTRAVENOUS PRN
Status: DISCONTINUED | OUTPATIENT
Start: 2020-09-29 | End: 2020-10-16 | Stop reason: HOSPADM

## 2020-09-29 RX ORDER — LOSARTAN POTASSIUM 50 MG/1
50 TABLET ORAL 2 TIMES DAILY
Status: DISCONTINUED | OUTPATIENT
Start: 2020-09-29 | End: 2020-10-16 | Stop reason: HOSPADM

## 2020-09-29 RX ORDER — PROMETHAZINE HYDROCHLORIDE 12.5 MG/1
12.5 TABLET ORAL EVERY 6 HOURS PRN
Status: CANCELLED | OUTPATIENT
Start: 2020-09-29

## 2020-09-29 RX ORDER — POTASSIUM CHLORIDE 20 MEQ/1
40 TABLET, EXTENDED RELEASE ORAL PRN
Status: DISCONTINUED | OUTPATIENT
Start: 2020-09-29 | End: 2020-10-16 | Stop reason: HOSPADM

## 2020-09-29 RX ADMIN — OXYCODONE AND ACETAMINOPHEN 1 TABLET: 5; 325 TABLET ORAL at 21:32

## 2020-09-29 RX ADMIN — GLIPIZIDE 10 MG: 5 TABLET ORAL at 09:01

## 2020-09-29 RX ADMIN — CYCLOBENZAPRINE 10 MG: 10 TABLET, FILM COATED ORAL at 21:33

## 2020-09-29 RX ADMIN — OXYCODONE HYDROCHLORIDE AND ACETAMINOPHEN 1 TABLET: 5; 325 TABLET ORAL at 12:05

## 2020-09-29 RX ADMIN — DICLOFENAC 2 G: 10 GEL TOPICAL at 09:16

## 2020-09-29 RX ADMIN — OXYCODONE HYDROCHLORIDE AND ACETAMINOPHEN 1 TABLET: 5; 325 TABLET ORAL at 07:05

## 2020-09-29 RX ADMIN — ENOXAPARIN SODIUM 40 MG: 40 INJECTION SUBCUTANEOUS at 09:02

## 2020-09-29 RX ADMIN — ACETAMINOPHEN 650 MG: 325 TABLET ORAL at 06:01

## 2020-09-29 RX ADMIN — INSULIN LISPRO 2 UNITS: 100 INJECTION, SOLUTION INTRAVENOUS; SUBCUTANEOUS at 09:02

## 2020-09-29 RX ADMIN — LOSARTAN POTASSIUM 50 MG: 50 TABLET, FILM COATED ORAL at 21:36

## 2020-09-29 RX ADMIN — ACETAMINOPHEN 650 MG: 325 TABLET ORAL at 17:26

## 2020-09-29 RX ADMIN — FAMOTIDINE 20 MG: 20 TABLET ORAL at 09:01

## 2020-09-29 RX ADMIN — INSULIN LISPRO 4 UNITS: 100 INJECTION, SOLUTION INTRAVENOUS; SUBCUTANEOUS at 13:10

## 2020-09-29 RX ADMIN — INSULIN GLARGINE 20 UNITS: 100 INJECTION, SOLUTION SUBCUTANEOUS at 21:33

## 2020-09-29 RX ADMIN — OXYCODONE AND ACETAMINOPHEN 1 TABLET: 5; 325 TABLET ORAL at 17:26

## 2020-09-29 RX ADMIN — FAMOTIDINE 20 MG: 20 TABLET ORAL at 21:33

## 2020-09-29 RX ADMIN — CYCLOBENZAPRINE 10 MG: 10 TABLET, FILM COATED ORAL at 13:08

## 2020-09-29 RX ADMIN — POLYETHYLENE GLYCOL (3350) 17 G: 17 POWDER, FOR SOLUTION ORAL at 09:01

## 2020-09-29 RX ADMIN — CYCLOBENZAPRINE 10 MG: 10 TABLET, FILM COATED ORAL at 09:01

## 2020-09-29 RX ADMIN — DOCUSATE SODIUM 50MG AND SENNOSIDES 8.6MG 1 TABLET: 8.6; 5 TABLET, FILM COATED ORAL at 09:01

## 2020-09-29 RX ADMIN — ACETAMINOPHEN 650 MG: 325 TABLET ORAL at 12:05

## 2020-09-29 RX ADMIN — DICLOFENAC 2 G: 10 GEL TOPICAL at 21:37

## 2020-09-29 RX ADMIN — INSULIN LISPRO 5 UNITS: 100 INJECTION, SOLUTION INTRAVENOUS; SUBCUTANEOUS at 17:27

## 2020-09-29 RX ADMIN — LOSARTAN POTASSIUM 50 MG: 25 TABLET, FILM COATED ORAL at 09:01

## 2020-09-29 RX ADMIN — DOCUSATE SODIUM 50 MG AND SENNOSIDES 8.6 MG 1 TABLET: 8.6; 5 TABLET, FILM COATED ORAL at 21:33

## 2020-09-29 ASSESSMENT — PAIN DESCRIPTION - ORIENTATION
ORIENTATION: LOWER;MID
ORIENTATION: MID;LOWER
ORIENTATION: LOWER;MID

## 2020-09-29 ASSESSMENT — PAIN SCALES - GENERAL
PAINLEVEL_OUTOF10: 8
PAINLEVEL_OUTOF10: 5
PAINLEVEL_OUTOF10: 6
PAINLEVEL_OUTOF10: 3
PAINLEVEL_OUTOF10: 6

## 2020-09-29 ASSESSMENT — PAIN DESCRIPTION - LOCATION
LOCATION: HIP

## 2020-09-29 ASSESSMENT — PAIN DESCRIPTION - PROGRESSION
CLINICAL_PROGRESSION: GRADUALLY WORSENING
CLINICAL_PROGRESSION: NOT CHANGED

## 2020-09-29 ASSESSMENT — PAIN DESCRIPTION - FREQUENCY
FREQUENCY: CONTINUOUS
FREQUENCY: INTERMITTENT
FREQUENCY: INTERMITTENT

## 2020-09-29 ASSESSMENT — PAIN DESCRIPTION - DESCRIPTORS
DESCRIPTORS: ACHING;CONSTANT
DESCRIPTORS: ACHING;CONSTANT
DESCRIPTORS: ACHING

## 2020-09-29 ASSESSMENT — PAIN DESCRIPTION - PAIN TYPE
TYPE: SURGICAL PAIN

## 2020-09-29 ASSESSMENT — PAIN DESCRIPTION - ONSET
ONSET: ON-GOING
ONSET: ON-GOING

## 2020-09-29 ASSESSMENT — PAIN - FUNCTIONAL ASSESSMENT: PAIN_FUNCTIONAL_ASSESSMENT: ACTIVITIES ARE NOT PREVENTED

## 2020-09-29 NOTE — CARE COORDINATION
LSW noted Pt has a discharge for today. Pt transportation arranged [employer] [facility] for a 1:30  time. RN, and Swing bed notified. Packet prepared.      Electronically signed by ODALYS Stringer on 9/29/2020 at 12:11 PM

## 2020-09-29 NOTE — DISCHARGE SUMMARY
Discharge Summary    Name:  Shana Tadeo /Age/Sex: 1955  (76 y.o. female)   MRN & CSN:  [id number] & [id number] Admission Date/Time: 2020  6:41 PM   Attending:  No att. providers found Discharging Physician: Penelope Marin MD       Hospital Course:   Shana Tadeo is a 76 y.o.  female  who presents with Closed displaced comminuted fracture of shaft of right femur ([address]       1. Femoral fracture, right: Status post open reduction internal fixation. · Pain control, diet per surgery  · Physical therapy and Occupational Therapy recommending skilled nursing facility. · DVT prophylaxis with Eliquis  · Follow-up with orthopedic surgery in 4 weeks     2. Acute hypoxic respiratory failure, improved, likely due to anemia vs undiagnosed APURVA, now saturating well on room air    · WBC normal, no respiratory symptoms. · Blood culture negative lactic acid normal.  · Procalcitonin normal.   · Advised incentive spirometry.     3. Low Back Pain  · Lumbar x ray with mild compression of T11 and T12. · Start lidocaine patch, diclofenac gel, Flexeril.     4. Hypertension: Continue losartan.       5. Acute blood loss anemia: Hemoglobin dropped, status post 1 units PRBC Hemoglobin stable, improved, 8.2 today     6. Diabetes: Sliding scale hypoglycemia protocol. Hemoglobin A1c 8.2. Resume glipizide. Started on Lantus and Humalog due to uncontrolled BG. Lantus adjusted. Disposition, patient is being discharged to swing bed in [facility]    The patient expressed appropriate understanding of and agreement with the discharge recommendations, medications, and plan.      Consults this admission:  IP CONSULT  [address] TO CASE MANAGEMENT    Discharge Instruction:   Follow up appointments: Orthopedic surgery in 4 weeks  Primary care physician:  within 2 weeks    Diet: Diabetic diet  Activity: activity as tolerated  Disposition: Discharged to:   []Home, []HHC, []SNF, [x]Acute Rehab, []Hospice , Swing bed  Condition on discharge: Stable    Discharge Medications:      William Grimes   Home Medication Instructions TIL:[id number]    Printed on:09/29/20 5049   Medication Information                      losartan (COZAAR) 50 MG tablet  Take 50 mg by mouth 2 times daily             metFORMIN (GLUCOPHAGE) 500 MG tablet  Take 500 mg by mouth 2 times daily (with meals)                 Objective Findings at Discharge:   BP (!) 161/72   Pulse 95   Temp 98.1 °F (36.7 °C) (Oral)   Resp 16   Ht 5' 8\" (1.727 m)   Wt 236 lb 6.4 oz (107.2 kg)   SpO2 90%   BMI 35.94 kg/m²            PHYSICAL EXAM   GEN Awake female, sitting upright in bed in no apparent distress. Appears given age. EYES Pupils are equally round. No scleral erythema, discharge, or conjunctivitis. HENT Mucous membranes are moist.   NECK No apparent thyromegaly or masses. RESP Clear to auscultation, no wheezes, rales or rhonchi. Symmetric chest movement while on room air. CARDIO/VASC S1/S2 auscultated. Regular rate without appreciable murmurs, rubs, or gallops. Peripheral pulses equal bilaterally and palpable. No peripheral edema. GI Abdomen is soft without significant tenderness, masses, or guarding. Bowel sounds are normoactive. Rectal exam deferred.  Dean catheter is  present. HEME/LYMPH No petechiae or ecchymoses. MSK No gross joint deformities. Spontaneous movement of all extremities, right thigh dressings clean and dry  SKIN Normal coloration, warm, dry. NEURO Cranial nerves appear grossly intact, normal speech, no lateralizing weakness. PSYCH Awake, alert, oriented x 4. Affect appropriate.       BMP/CBC  Recent Labs     09/27/20  0203 09/27/20  1536 09/29/20  0922   NA  --   --  138   K  --   --  4.0   CL  --   --  99   CO2  --   --  30   BUN  --   --  14   CREATININE  --   --  0.7   WBC  --  10.2 8.9   HCT 24.7* 25.5* 26.5*   PLT  --  264 383       IMAGING:  Xr Lumbar Spine (2-3 Views)    Result Date: 9/24/2020  EXAMINATION: 4 XRAY VIEWS OF THE LUMBAR SPINE 9/24/2020 11:34 am COMPARISON: None. HISTORY: ORDERING SYSTEM PROVIDED HISTORY: back pain s/p fall TECHNOLOGIST PROVIDED HISTORY: Reason for exam:->back pain s/p fall Reason for Exam: Back pain Acuity: Acute Type of Exam: Initial Mechanism of Injury: fall FINDINGS: Lumbar vertebral bodies are normal in height. Mild grade 1 anterolisthesis L4 on L5. No acute lumbar spine fracture. Possible compression deformities T11 and T12. No evidence of fracture. Visualized sacrum is unremarkable. No significant degenerative changes. 1. Mild grade 1 anterolisthesis L4 on L5 2. No acute lumbar spine abnormality 3. Possible mild compression deformities of T11 and T12. Radiographs of the thoracic spine are suggested 4. The findings were sent to the Radiology Results [address] at 12:21 pm on 9/24/2020to be communicated to a licensed caregiver. Xr Femur Right (min 2 Views)    Result Date: 9/22/2020  EXAMINATION: ONE XRAY VIEW OF THE PELVIS AND TWO XRAY VIEWS RIGHT HIP; 3 XRAY VIEWS OF THE RIGHT FEMUR 9/22/2020 12:04 pm COMPARISON: None. HISTORY: ORDERING SYSTEM PROVIDED HISTORY: Fall TECHNOLOGIST PROVIDED HISTORY: Reason for exam:->Fall Reason for Exam: fall Acuity: Acute Type of Exam: Initial Mechanism of Injury: tripped Relevant Medical/Surgical History: distal femur pain and swelling; ORDERING SYSTEM PROVIDED HISTORY: Fall TECHNOLOGIST PROVIDED HISTORY: Reason for exam:->Fall Reason for Exam: fall Acuity: Acute Type of Exam: Initial Mechanism of Injury: tripped Relevant Medical/Surgical History: diatal femur pain and swelling FINDINGS: Oblique laterally displaced fracture distal femur. Otherwise, anatomic alignment. No other fracture identified. Oblique laterally displaced fracture distal femur     Fl Less Than 1 Hour    Result Date: 9/23/2020  Radiology exam is complete. No Radiologist dictation. Please follow up with ordering provider.      Xr Chest Portable    Result Date: 9/25/2020  EXAMINATION: ONE XRAY VIEW OF THE CHEST 9/25/2020 11:13 am COMPARISON: None. HISTORY: ORDERING SYSTEM PROVIDED HISTORY: tachy, fever, rule out PNA TECHNOLOGIST PROVIDED HISTORY: Reason for exam:->tachy, fever, rule out PNA Reason for Exam: tachy, fever, rule out PNA FINDINGS: The heart and mediastinal structures are satisfactory. The pulmonary vasculature is normal.  Lungs are clear except for a calcified granuloma in the left midlung. No acute cardiopulmonary disease. Xr Hip 2-3 Vw W Pelvis Right    Result Date: 9/22/2020  EXAMINATION: ONE XRAY VIEW OF THE PELVIS AND TWO XRAY VIEWS RIGHT HIP; 3 XRAY VIEWS OF THE RIGHT FEMUR 9/22/2020 12:04 pm COMPARISON: None. HISTORY: ORDERING SYSTEM PROVIDED HISTORY: Fall TECHNOLOGIST PROVIDED HISTORY: Reason for exam:->Fall Reason for Exam: fall Acuity: Acute Type of Exam: Initial Mechanism of Injury: tripped Relevant Medical/Surgical History: distal femur pain and swelling; ORDERING SYSTEM PROVIDED HISTORY: Fall TECHNOLOGIST PROVIDED HISTORY: Reason for exam:->Fall Reason for Exam: fall Acuity: Acute Type of Exam: Initial Mechanism of Injury: tripped Relevant Medical/Surgical History: diatal femur pain and swelling FINDINGS: Oblique laterally displaced fracture distal femur. Otherwise, anatomic alignment. No other fracture identified.      Oblique laterally displaced fracture distal femur     Discharge Time of 35 minutes    Electronically signed by Kadie Mays MD on 9/29/2020 at 3:44 PM

## 2020-09-29 NOTE — PLAN OF CARE
Problem: Pain:  Goal: Pain level will decrease  Description: Pain level will decrease  9/29/2020 0450 by Ken Monet RN  Outcome: Ongoing  9/28/2020 1643 by Floyd Aden RN  Outcome: Ongoing  Goal: Control of acute pain  Description: Control of acute pain  9/29/2020 0450 by Ken Monet RN  Outcome: Ongoing  9/28/2020 1643 by Floyd Aden RN  Outcome: Ongoing  Goal: Control of chronic pain  Description: Control of chronic pain  9/29/2020 0450 by Ken Monet RN  Outcome: Ongoing  9/28/2020 1643 by Floyd Aden RN  Outcome: Ongoing  Goal: Patient's pain/discomfort is manageable  Description: Patient's pain/discomfort is manageable  9/29/2020 0450 by Ken Monet RN  Outcome: Ongoing  9/28/2020 1643 by Floyd Aden RN  Outcome: Ongoing     Problem: Infection:  Goal: Will remain free from infection  Description: Will remain free from infection  9/29/2020 0450 by Ken Monet RN  Outcome: Ongoing  9/28/2020 1643 by Floyd Aden RN  Outcome: Ongoing     Problem: Safety:  Goal: Free from accidental physical injury  Description: Free from accidental physical injury  9/29/2020 0450 by Ken Monet RN  Outcome: Ongoing  9/28/2020 1643 by Floyd Aden RN  Outcome: Ongoing  Goal: Free from intentional harm  Description: Free from intentional harm  9/29/2020 0450 by Ken Monet RN  Outcome: Ongoing  9/28/2020 1643 by Floyd Aden RN  Outcome: Ongoing     Problem: Daily Care:  Goal: Daily care needs are met  Description: Daily care needs are met  9/29/2020 0450 by Ken Monet RN  Outcome: Ongoing  9/28/2020 1643 by Floyd Aden RN  Outcome: Ongoing     Problem: Skin Integrity:  Goal: Skin integrity will stabilize  Description: Skin integrity will stabilize  9/29/2020 0450 by Ken Monet RN  Outcome: Ongoing  9/28/2020 1643 by Floyd Aden RN  Outcome: Ongoing     Problem: Discharge Planning:  Goal: Patients continuum of care needs are met  Description: Patients continuum of care needs are met  9/29/2020 0450 by Anatoly Delcid RN  Outcome: Ongoing  9/28/2020 1643 by Modesto Rodriguez RN  Outcome: Ongoing     Problem: Skin Integrity:  Goal: Will show no infection signs and symptoms  Description: Will show no infection signs and symptoms  9/29/2020 0450 by Anatoly Delcid RN  Outcome: Ongoing  9/28/2020 1643 by Modesto Rodriguez RN  Outcome: Ongoing  Goal: Absence of new skin breakdown  Description: Absence of new skin breakdown  9/29/2020 0450 by Anatoly Delcid RN  Outcome: Ongoing  9/28/2020 1643 by Modesto Rodriguez RN  Outcome: Ongoing     Problem: Falls - Risk of:  Goal: Will remain free from falls  Description: Will remain free from falls  9/29/2020 0450 by Anatoly Delcid RN  Outcome: Ongoing  9/28/2020 1643 by Modesto Rodriguez RN  Outcome: Ongoing  Goal: Absence of physical injury  Description: Absence of physical injury  9/29/2020 0450 by Anatoly Delcid RN  Outcome: Ongoing  9/28/2020 1643 by Modesto Rodriguez RN  Outcome: Ongoing

## 2020-09-29 NOTE — PROGRESS NOTES
Diet and supplement review-  Supplement changed to diabetic verses high calorie ensure due to DM. Will follow per protocol.

## 2020-09-30 LAB
GLUCOSE BLD-MCNC: 134 MG/DL (ref 70–99)
GLUCOSE BLD-MCNC: 139 MG/DL (ref 70–99)
GLUCOSE BLD-MCNC: 147 MG/DL (ref 70–99)
GLUCOSE BLD-MCNC: 149 MG/DL (ref 70–99)

## 2020-09-30 PROCEDURE — 6370000000 HC RX 637 (ALT 250 FOR IP): Performed by: INTERNAL MEDICINE

## 2020-09-30 PROCEDURE — 97530 THERAPEUTIC ACTIVITIES: CPT

## 2020-09-30 PROCEDURE — 97166 OT EVAL MOD COMPLEX 45 MIN: CPT

## 2020-09-30 PROCEDURE — 6360000002 HC RX W HCPCS: Performed by: INTERNAL MEDICINE

## 2020-09-30 PROCEDURE — 97162 PT EVAL MOD COMPLEX 30 MIN: CPT

## 2020-09-30 PROCEDURE — 97535 SELF CARE MNGMENT TRAINING: CPT

## 2020-09-30 PROCEDURE — 1200000002 HC SEMI PRIVATE SWING BED

## 2020-09-30 RX ADMIN — OXYCODONE AND ACETAMINOPHEN 1 TABLET: 5; 325 TABLET ORAL at 06:08

## 2020-09-30 RX ADMIN — DOCUSATE SODIUM 50 MG AND SENNOSIDES 8.6 MG 1 TABLET: 8.6; 5 TABLET, FILM COATED ORAL at 10:08

## 2020-09-30 RX ADMIN — ACETAMINOPHEN 650 MG: 325 TABLET ORAL at 12:24

## 2020-09-30 RX ADMIN — DICLOFENAC 2 G: 10 GEL TOPICAL at 10:10

## 2020-09-30 RX ADMIN — GLIPIZIDE 10 MG: 10 TABLET, FILM COATED, EXTENDED RELEASE ORAL at 10:09

## 2020-09-30 RX ADMIN — OXYCODONE AND ACETAMINOPHEN 1 TABLET: 5; 325 TABLET ORAL at 18:24

## 2020-09-30 RX ADMIN — OXYCODONE AND ACETAMINOPHEN 1 TABLET: 5; 325 TABLET ORAL at 10:08

## 2020-09-30 RX ADMIN — ACETAMINOPHEN 650 MG: 325 TABLET ORAL at 18:24

## 2020-09-30 RX ADMIN — LOSARTAN POTASSIUM 50 MG: 50 TABLET, FILM COATED ORAL at 10:08

## 2020-09-30 RX ADMIN — INSULIN LISPRO 5 UNITS: 100 INJECTION, SOLUTION INTRAVENOUS; SUBCUTANEOUS at 18:00

## 2020-09-30 RX ADMIN — INSULIN GLARGINE 20 UNITS: 100 INJECTION, SOLUTION SUBCUTANEOUS at 21:04

## 2020-09-30 RX ADMIN — INSULIN LISPRO 2 UNITS: 100 INJECTION, SOLUTION INTRAVENOUS; SUBCUTANEOUS at 08:14

## 2020-09-30 RX ADMIN — INSULIN LISPRO 5 UNITS: 100 INJECTION, SOLUTION INTRAVENOUS; SUBCUTANEOUS at 12:23

## 2020-09-30 RX ADMIN — FAMOTIDINE 20 MG: 20 TABLET ORAL at 21:05

## 2020-09-30 RX ADMIN — CYCLOBENZAPRINE 10 MG: 10 TABLET, FILM COATED ORAL at 10:09

## 2020-09-30 RX ADMIN — DICLOFENAC 2 G: 10 GEL TOPICAL at 21:04

## 2020-09-30 RX ADMIN — LOSARTAN POTASSIUM 50 MG: 50 TABLET, FILM COATED ORAL at 21:05

## 2020-09-30 RX ADMIN — FAMOTIDINE 20 MG: 20 TABLET ORAL at 10:09

## 2020-09-30 RX ADMIN — INSULIN LISPRO 5 UNITS: 100 INJECTION, SOLUTION INTRAVENOUS; SUBCUTANEOUS at 08:16

## 2020-09-30 RX ADMIN — ACETAMINOPHEN 650 MG: 325 TABLET ORAL at 23:40

## 2020-09-30 RX ADMIN — INSULIN LISPRO 2 UNITS: 100 INJECTION, SOLUTION INTRAVENOUS; SUBCUTANEOUS at 18:00

## 2020-09-30 RX ADMIN — OXYCODONE AND ACETAMINOPHEN 1 TABLET: 5; 325 TABLET ORAL at 13:56

## 2020-09-30 RX ADMIN — DOCUSATE SODIUM 50 MG AND SENNOSIDES 8.6 MG 1 TABLET: 8.6; 5 TABLET, FILM COATED ORAL at 21:05

## 2020-09-30 RX ADMIN — ENOXAPARIN SODIUM 40 MG: 40 INJECTION SUBCUTANEOUS at 10:09

## 2020-09-30 RX ADMIN — CYCLOBENZAPRINE 10 MG: 10 TABLET, FILM COATED ORAL at 13:56

## 2020-09-30 RX ADMIN — CYCLOBENZAPRINE 10 MG: 10 TABLET, FILM COATED ORAL at 21:05

## 2020-09-30 RX ADMIN — ACETAMINOPHEN 650 MG: 325 TABLET ORAL at 00:52

## 2020-09-30 ASSESSMENT — PAIN DESCRIPTION - DESCRIPTORS
DESCRIPTORS: ACHING;CONSTANT
DESCRIPTORS: ACHING;THROBBING
DESCRIPTORS: ACHING;THROBBING
DESCRIPTORS: ACHING;CRUSHING;THROBBING
DESCRIPTORS: ACHING;CONSTANT
DESCRIPTORS: ACHING

## 2020-09-30 ASSESSMENT — PAIN SCALES - GENERAL
PAINLEVEL_OUTOF10: 6
PAINLEVEL_OUTOF10: 8
PAINLEVEL_OUTOF10: 4
PAINLEVEL_OUTOF10: 0
PAINLEVEL_OUTOF10: 8
PAINLEVEL_OUTOF10: 10
PAINLEVEL_OUTOF10: 6
PAINLEVEL_OUTOF10: 4
PAINLEVEL_OUTOF10: 6
PAINLEVEL_OUTOF10: 3

## 2020-09-30 ASSESSMENT — PAIN DESCRIPTION - ONSET
ONSET: ON-GOING
ONSET: GRADUAL

## 2020-09-30 ASSESSMENT — PAIN DESCRIPTION - ORIENTATION
ORIENTATION: LOWER
ORIENTATION: LOWER;MID
ORIENTATION: LOWER;MID
ORIENTATION: LOWER;RIGHT
ORIENTATION: RIGHT;LOWER
ORIENTATION: RIGHT;LOWER
ORIENTATION: LOWER;MID
ORIENTATION: LOWER;RIGHT

## 2020-09-30 ASSESSMENT — PAIN DESCRIPTION - LOCATION
LOCATION: HIP
LOCATION: BACK;HIP;LEG
LOCATION: BACK;LEG;HIP
LOCATION: BACK
LOCATION: BACK;HIP;LEG
LOCATION: HIP
LOCATION: HIP
LOCATION: BACK
LOCATION: BACK;HIP;LEG

## 2020-09-30 ASSESSMENT — PAIN DESCRIPTION - PAIN TYPE
TYPE: SURGICAL PAIN
TYPE: SURGICAL PAIN
TYPE: CHRONIC PAIN
TYPE: SURGICAL PAIN
TYPE: SURGICAL PAIN;CHRONIC PAIN
TYPE: CHRONIC PAIN;SURGICAL PAIN
TYPE: SURGICAL PAIN;CHRONIC PAIN
TYPE: CHRONIC PAIN;SURGICAL PAIN

## 2020-09-30 ASSESSMENT — PAIN DESCRIPTION - PROGRESSION
CLINICAL_PROGRESSION: GRADUALLY WORSENING
CLINICAL_PROGRESSION: NOT CHANGED
CLINICAL_PROGRESSION: GRADUALLY WORSENING
CLINICAL_PROGRESSION: GRADUALLY IMPROVING

## 2020-09-30 ASSESSMENT — PAIN - FUNCTIONAL ASSESSMENT
PAIN_FUNCTIONAL_ASSESSMENT: ACTIVITIES ARE NOT PREVENTED

## 2020-09-30 ASSESSMENT — PAIN DESCRIPTION - FREQUENCY
FREQUENCY: CONTINUOUS
FREQUENCY: INTERMITTENT
FREQUENCY: INTERMITTENT
FREQUENCY: CONTINUOUS
FREQUENCY: CONTINUOUS

## 2020-09-30 NOTE — PROGRESS NOTES
Occupational Therapy   Occupational Therapy Initial Assessment  Date: 2020   Patient Name: Felicita Duran  MRN: [id number]     : 1955    Date of Service: 2020    Discharge Recommendations:  Home with Home health OT, Home with nursing aide, Home independently, Home with assist PRN, 24 hour supervision or assist, Continue to assess pending progress  OT Equipment Recommendations  Equipment Needed: Yes  Mobility Devices: ADL Assistive Devices  ADL Assistive Devices: Transfer Tub Bench;Hand-held Shower; Toileting - Raised Toilet Seat with arms;Long-handled Sponge;Sock-Aid Soft  Other: Continue to assess needs for asistive devices    Assessment   Performance deficits / Impairments: Decreased functional mobility ; Decreased ADL status; Decreased strength;Decreased safe awareness;Decreased balance;Decreased high-level IADLs  Assessment: Pt is a 76year old female who presents with the following deficits s/p hip fracture. Pt would benefit from continued skilled therapy to address deficits and increase functional independence prior to return home  Treatment Diagnosis: R IMN and rodafter fall c hi pfracture  Prognosis: Good  Decision Making: Medium Complexity  OT Education: OT Role;Plan of Care;Energy Conservation; ADL Adaptive Strategies;Precautions;Transfer Training  Patient Education: educated on use of reacher for LB dressing, tub transfer training, toilet transfer training, use of long handeled sponge  REQUIRES OT FOLLOW UP: Yes  Activity Tolerance  Activity Tolerance: Patient limited by fatigue;Patient limited by pain  Safety Devices  Safety Devices in place: Yes  Type of devices: All fall risk precautions in place; Patient at risk for falls; Left in chair;Nurse notified;Call light within reach           Patient Diagnosis(es): There were no encounter diagnoses. has a past medical history of Arthritis, Diabetes mellitus ([facility] [address] and Hypertension.    has a past surgical history that includes Cholecystectomy assistance  Functional Mobility  Functional - Mobility Device: Rolling Walker  Activity: To/from bathroom  Assist Level: Minimal assistance  Functional Mobility Comments: pt TTWB RLE c RW to bedside commode with min A and cues for safety. Toilet Transfers  Toilet - Technique: Ambulating  Equipment Used: Standard bedside commode  Toilet Transfer: Minimal assistance  Toilet Transfers Comments: c RW and cues for safety  ADL  Feeding: Modified independent   Grooming: Modified independent ;Setup(Mod I with setup to brush teeth/dentures, brush hair while in recliner)  UE Bathing: Modified independent (mod I for UB bathing while seated on shower bench using hand carmelita shower head)  UE Dressing: Maximum assistance(Max A for distal parts of bilateral LE and buttock)  LE Dressing: Moderate assistance;Verbal cueing;Setup; Increased time to complete(Mod A, increased time and cueing to don sweatpants using reacher.)  Toileting: Maximum assistance(Max A for pericare following bowel movement)   AM-PAC 6 click short form for inpatient daily activity:   How much help from another person does the patient currently need. .. Unable  Dep A Lot  Max A A Lot   Mod A A Little  Min A A Little   CGA  SBA None   Mod I  Indep  Sup   1. Putting on and taking off regular lower body clothing? [] 1    [] 2   [x] 2   [] 3   [] 3   [] 4      2. Bathing (including washing, rinsing, drying)? [] 1   [] 2   [x] 2 [] 3 [] 3 [] 4   3. Toileting, which includes using toilet, bedpan, or urinal? [] 1    [x] 2   [] 2   [] 3   [] 3   [] 4     4. Putting on and taking off regular upper body clothing? [] 1   [] 2   [] 2   [] 3   [] 3    [x] 4      5. Taking care of personal grooming such as brushing teeth? [] 1   [] 2    [] 2 [] 3    [] 3   [x] 4      6. Eating meals?    [] 1   [] 2   [] 2   [] 3   [] 3   [x] 4      Raw Score: 18/24   [24=0% impaired(CH), 23=1-19%(CI), 20-22=20-39%(CJ), 15-19=40-59%(CK), 10-14=60-79%(CL), 7-9=80-99%(CM), 6=100%(CN)]    Tone RUE  RUE Tone: Normotonic  Tone LUE  LUE Tone: Normotonic  Coordination  Movements Are Fluid And Coordinated: Yes        Transfers  Sit to stand: Minimal assistance  Stand to sit: Minimal assistance     Cognition  Overall Cognitive Status: WFL        Sensation  Overall Sensation Status: Impaired(reports numbness in RLE since fall)        LUE AROM (degrees)  LUE AROM : WFL  Left Hand AROM (degrees)  Left Hand AROM: WFL  LUE Strength  Gross LUE Strength: WNL  LUE Strength Comment: observed UE strength WNL during functional tasks however not formally tested due to fatigue  RUE Strength  Gross RUE Strength: WNL  RUE Strength Comment: observed UE strength WNL during functional tasks however not formally tested due to fatigue                   Plan   Plan  Times per week: 4x  Times per day: Daily  Current Treatment Recommendations: Strengthening, Endurance Training, Patient/Caregiver Education & Training, Self-Care / ADL, Equipment Evaluation, Education, & procurement, Balance Training, Gait Training, Home Management Training, Functional Mobility Training, Safety Education & Training    G-Code     OutComes Score                                                  AM-PAC Score             Goals  Short term goals  Time Frame for Short term goals: Until discharge  Short term goal 1: Pt will complete all LB ADLs mod I using AE PRN and min cues for increased functional independence  Short term goal 2: Pt will complete all functional transfers with SBA and min cues for increased functional independence. Short term goal 3: Pt will complete all aspects of toileting mod I for increased functional independence  Short term goal 4: Pt will participate in therex/act with focus on UE strengthening and home management with min cues for increased independence with IADL tasks  Patient Goals   Patient goals :  To return home       Therapy Time   Individual Concurrent Group Co-treatment   Time In       1122   Time Out       1249   Minutes

## 2020-09-30 NOTE — PROGRESS NOTES
Physical Therapy    Facility/Department: [facility] UNIT  Initial Assessment    NAME: Lolly Colón  : 1955  MRN: [id number]    Date of Service: 2020    Discharge Recommendations:  Home with Home health PT   PT Equipment Recommendations  Equipment Needed: Yes  Mobility Devices: Diaz Hair: Rolling    Assessment   Body structures, Functions, Activity limitations: Decreased functional mobility ; Increased pain;Decreased ADL status; Decreased balance;Decreased posture;Decreased ROM; Decreased strength;Decreased safe awareness;Decreased endurance  Assessment: P is 77 yo female who presents s/p fall with R femur ORIF who presents with significant deficits in strength, balance, gait and overall reduced safety with mobility. Recommend skilled PT to address deficits and maximize functional potential.  Treatment Diagnosis: impaired balance, strength, gait  Prognosis: Good  Decision Making: Medium Complexity  History: see below  Exam: assessed mobility, functional ROM, strength, gait  Clinical Presentation: evolving  PT Education: PT Role;Plan of Care;Goals  Barriers to Learning: none  REQUIRES PT FOLLOW UP: Yes  Activity Tolerance  Activity Tolerance: Patient limited by fatigue;Patient limited by endurance       Patient Diagnosis(es): There were no encounter diagnoses. has a past medical history of Arthritis, Diabetes mellitus ([facility] [address] and Hypertension. has a past surgical history that includes Cholecystectomy (); Femur fracture surgery (Right, 2020); and Tonsillectomy.     Restrictions  Restrictions/Precautions  Restrictions/Precautions: Weight Bearing, Fall Risk(spinal precautions)  Required Braces or Orthoses?: No  Lower Extremity Weight Bearing Restrictions  Right Lower Extremity Weight Bearing: Toe Touch Weight Bearing  Position Activity Restriction  Other position/activity restrictions: TTWB RLE  Vision/Hearing        Subjective  General  Chart Reviewed: Yes  Patient assessed for rehabilitation services?: Yes(Simultaneous filing. User may not have seen previous data.)  Family / Caregiver Present: No  Follows Commands: Within Functional Limits  General Comment  Comments: back hurts from fall  Subjective  Subjective: \"I slipped in the bathroom\"  Pain Screening  Patient Currently in Pain: Yes(Simultaneous filing. User may not have seen previous data.)  Pain Assessment  Pain Assessment: 0-10(Simultaneous filing. User may not have seen previous data.)  Pain Location: Back  Vital Signs  Patient Currently in Pain: Yes(Simultaneous filing. User may not have seen previous data.)       Orientation  Orientation  Overall Orientation Status: Within Functional Limits  Social/Functional History  Social/Functional History  Lives With: Alone  Type of Home: Apartment  Home Layout: One level  Home Access: Level entry  Bathroom Shower/Tub: Tub/Shower unit  Bathroom Toilet: Standard  Bathroom Equipment: Grab bars around toilet  Bathroom Accessibility: Accessible  Receives Help From: Family  ADL Assistance: Independent  Homemaking Assistance: Independent  Homemaking Responsibilities: Yes  Ambulation Assistance: Independent  Transfer Assistance: Independent  Active : Yes  Occupation: Retired  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     AROM RLE (degrees)  RLE AROM: Exceptions  RLE General AROM: reduced knee flexion, reduced knee extension and hip flexion functionally  AROM LLE (degrees)  LLE AROM : WFL  Strength RLE  Strength RLE: Exception  Comment: Not tested 2/2 pain, 3/5 based on function  Strength LLE  Strength LLE: Exception  Comment: Not formally assessed at least 3+/5  Motor Control  Gross Motor?: WFL  Sensation  Overall Sensation Status: Impaired(reports intermittent RLE numbness since fall)  Bed mobility  Comment: Not assessed p seen up in chair  Transfers  Sit to Stand: Moderate Assistance;2 Person Assistance  Stand to sit: Minimal Assistance;2 Person Assistance  Comment: P performs sit <> stand from recliner x 3 reps with assist x 2 min-mod A. Sit <> stand from [facility] x 2 reps with mod A. Sit <> stand from wc x 2 reps with mod A. P requires assist for lift and cues for RLE foot placement. Ambulation  Ambulation?: Yes  WB Status: TTWB on RLE  More Ambulation?: No  Ambulation 1  Surface: level tile  Device: Rolling Walker  Assistance: Contact guard assistance;Minimal assistance  Gait Deviations: Slow She;Decreased step length;Decreased step height  Distance: 10' x 4 bouts with CGA to min A for balance  Comments: Cues to maintain weight bearing. P with initial step at times on heel requiring cues to correct. Functional mobility: P stands in shower with min A to balance and BUE support on Hrs. P requires max A to assist with adjusting and moving RLE on wc and in shower.      Balance  Posture: Fair  Sitting - Static: Good  Sitting - Dynamic: Good  Standing - Static: Fair  Standing - Dynamic: Poor        Plan   Plan  Times per week: 6+  Plan weeks: 3 weeks or until discharge  Current Treatment Recommendations: Strengthening, ROM, Neuromuscular Re-education, Safety Education & Training, Balance Training, Endurance Training, Functional Mobility Training, Equipment Evaluation, Education, & procurement, Transfer Training, Gait Training, Stair training, Positioning, Pain Management  Safety Devices  Type of devices: Call light within reach, Left in chair, Nurse notified  Restraints  Initially in place: No      AM-PAC Score  AM-PAC Inpatient Mobility Raw Score : 13 (09/30/20 1321)  AM-PAC Inpatient T-Scale Score : 36.74 (09/30/20 1321)  Mobility Inpatient CMS 0-100% Score: 64.91 (09/30/20 1321)  Mobility Inpatient CMS G-Code Modifier : CL (09/30/20 1321)          Goals  Short term goals  Time Frame for Short term goals: 3 weeks  Short term goal 1: P will perform bed mobility with Celso  Short term goal 2: Pt will perform sit <> stand to RW with Celso  Short term goal 3: P will perform standing activity with 1 UE support with Celso x 3 min to return to premorbid activities.   Short term goal 4: P will ambulate x 150' with RW and Celso while maintaining TTWB on RLE       Therapy Time   Individual Concurrent Group Co-treatment   Time In       1121   Time Out       1246   Minutes       85   Timed Code Treatment Minutes: 79 Minutes       Belkis Up PT    Electronically signed by Belkis Up PT on 9/30/2020 at 1:23 PM

## 2020-09-30 NOTE — CARE COORDINATION
SWING BED WEEKLY TEAM SHEET     Stacie Javier   9/30/2020 WEEK # 1    Care Management    Issues to be resolved before discharge: Increased strength, balance, and mobility    Family Education: Patient/staff to update    Discharge Plan: Home with Rachel Al  Patient/Family Adjustment: Will need a walker and wide bedside commode      Goals of previous week:   [x] 1st team   [] met   [] partially met    [] not met             Why goals were not met: Patient just admitted yesterday afternoon (9/29/2020)    Skilled Level of Care Remains   [x] yes   [] no    Estimated length of stay: 2 weeks  Patient/Family Concerns/input: None at this time    Patient/Family  Signature _________________  9/30/2020       Care Management Signature:  Asher Smith RN

## 2020-09-30 NOTE — CARE COORDINATION
CM met with the patient for discharge planning, patient resting quietly in the recliner. Patient lives alone in her apartment (no steps), has insurance with Rx coverage & PCP, stated that she was independent with ADL's prior to admission, and still drives. Patient stated that she did not require the use of any assistive devices prior to admission or home oxygen. Patient stated that she will need a walker and wide bedside commode prior to discharge. CM will follow.

## 2020-09-30 NOTE — H&P
History and Physical      Name:  Celine Cruz /Age/Sex: 1955  (76 y.o. female)   MRN & CSN:  [id number] & [id number] Admission Date/Time: 2020  2:52 PM   Location:   PCP: Sara Main Day: 2    Assessment and Plan:     1. Status post ORIF right femur: Postop day 7. Patient working with physical therapy and Occupational Therapy doing well. Continue pain control with physical therapy as needed. Patient was on Eliquis for DVT prophylaxis. Currently enoxaparin. Patient ambulating. Patient to follow-up with orthopedic surgery in 4 weeks. 2.  Hypertension: Slightly elevated 142/70. However, this was prior to medications. We will continue losartan 50 mg twice daily. Continue to monitor pain possibly component as well    3. Insulin-dependent diabetes: Hemoglobin A1c on recent admission 8.2. Patient currently on glipizide 10 mg. As well as Lantus 20 units at bedtime 5 units of lispro with meals and a sliding scale at this time for correction. Continue to monitor. 4.  Acute blood loss anemia: Postoperatively. Hemoglobin stable 8.2 prior to discharge. No active bleeding. We will continue to monitor. 5.  Low back pain: Patient had mild compression of T11-T12 on lumbar radiographs. Recommending thoracic radiographs. Have ordered same. Patient on spinal precautions with physical therapy. Will continue same until clarification. We will continue Lidoderm patch this time. Patient continue to work with physical therapy. We will monitor and treat chronic diseases. Adjust as needed. Pending thoracic radiograph at this time.       Diet DIET CARB CONTROL; Carb Control: 4 carb choices (60 gms)/meal  Dietary Nutrition Supplements: Diabetic Oral Supplement   DVT Prophylaxis [x] Lovenox, []  Heparin, [] SCDs, [] No VTE prophylaxis, patient ambulating   GI Prophylaxis [x] PPI, [] H2 Blocker, [] No GI prophylaxis, patient is receiving diet/Tube Feeds   Code Status Full Code   Disposition Patient requires continued admission due to work and physical therapy   MDM [] Low, [x] Moderate,[]  High  Patient's risk as above due to chronic conditions with recent surgery     History of Present Illness:     Chief Complaint: Closed displaced comminuted fracture of shaft of right femur  Lolita Conley is a 76 y.o.  female  who presents with postop ORIF postop day 7. Patient in swing bed for rehabilitation. Denies any headache blurred vision dyspnea denies any chest pain palpitation shortness of breath. Denies any fever chills nausea or vomiting. With a past medical significant for hypertension and diabetes most recent hemoglobin A1c 8.2. Also with chronic low back pain. Mostly just some right lower extremity pain that is improved. Patient ambulating better today. Continue to work with PT/OT       10-14 point ROS reviewed negative, unless as noted above    Objective: Intake/Output Summary (Last 24 hours) at 9/30/2020 1623  Last data filed at 9/30/2020 0935  Gross per 24 hour   Intake 360 ml   Output 600 ml   Net -240 ml      Vitals:   Vitals:    09/30/20 0741   BP: (!) 142/70   Pulse: 88   Resp: 20   Temp: 96.8 °F (36 °C)   SpO2: 92%     Physical Exam:    GEN Awake female, sitting upright in bed in no apparent distress. Appears given age. EYES Pupils are equally round. No scleral erythema, discharge, or conjunctivitis. HENT Mucous membranes are moist.   NECK No apparent thyromegaly or masses. RESP Clear to auscultation, no wheezes, rales or rhonchi. Symmetric chest movement while on room air. CARDIO/VASC S1/S2 auscultated. Regular rate without appreciable murmurs, rubs, or gallops. Peripheral pulses equal bilaterally and palpable. No peripheral edema. GI Abdomen is soft without significant tenderness, masses, or guarding. Bowel sounds are normoactive. Rectal exam deferred.  Dean catheter is not present. HEME/LYMPH No petechiae or ecchymoses.   MSK No gross joint deformities. Spontaneous movement of all extremities but decreased range of motion right lower extremity secondary to recent surgery  SKIN Normal coloration, warm, dry. Surgical incision without excessive drainage erythema or calor healing  NEURO Cranial nerves appear grossly intact, normal speech, no lateralizing weakness. PSYCH Awake, alert, oriented x 4. Affect appropriate. Past Medical History:      Past Medical History:   Diagnosis Date    Arthritis     Diabetes mellitus ([address]     Hypertension      PSHX:  has a past surgical history that includes Cholecystectomy (); Femur fracture surgery (Right, 2020); and Tonsillectomy. Allergies: Allergies   Allergen Reactions    Dristan Spray [Nasal Spray] Hives    Grapeseed Extract [Nutritional Supplements] Hives     Grapes and raisins as well    Entex La Rash       FAM HX: family history includes Diabetes in her mother and sister. Soc HX:   Social History     Socioeconomic History    Marital status:       Spouse name: None    Number of children: None    Years of education: None    Highest education level: None   Occupational History    None   Social Needs    Financial resource strain: None    Food insecurity     Worry: None     Inability: None    Transportation needs     Medical: None     Non-medical: None   Tobacco Use    Smoking status: Former Smoker     Last attempt to quit: 1975     Years since quittin.0    Smokeless tobacco: Never Used   Substance and Sexual Activity    Alcohol use: Yes     Comment: 2-3 glasses of wine each weekend    Drug use: No    Sexual activity: Yes     Partners: Male   Lifestyle    Physical activity     Days per week: None     Minutes per session: None    Stress: None   Relationships    Social connections     Talks on phone: None     Gets together: None     Attends Anabaptism service: None     Active member of club or organization: None     Attends meetings of clubs or organizations: None     Relationship status: None    Intimate partner violence     Fear of current or ex partner: None     Emotionally abused: None     Physically abused: None     Forced sexual activity: None   Other Topics Concern    None   Social History Narrative    None       Medications:   Medications:    acetaminophen  650 mg Oral Q6H    enoxaparin  40 mg Subcutaneous Daily    sennosides-docusate sodium  1 tablet Oral BID    sodium chloride flush  10 mL Intravenous 2 times per day    famotidine  20 mg Oral BID    cyclobenzaprine  10 mg Oral TID    diclofenac sodium  2 g Topical BID    glipiZIDE  10 mg Oral Daily with breakfast    insulin glargine  20 Units Subcutaneous Nightly    insulin lispro  0-12 Units Subcutaneous TID WC    insulin lispro  0-6 Units Subcutaneous Nightly    insulin lispro  5 Units Subcutaneous TID WC    lidocaine  1 patch Transdermal Daily    losartan  50 mg Oral BID    polyethylene glycol  17 g Oral BID      Infusions:    dextrose       PRN Meds: magnesium hydroxide, 30 mL, Daily PRN  sodium chloride flush, 10 mL, PRN  dextrose, 1,000 mL, PRN  dextrose, 12.5 g, PRN  glucagon (rDNA), 1 mg, PRN  glucose, 15 g, PRN  magnesium sulfate, 2 g, PRN  potassium chloride, 40 mEq, PRN    Or  potassium alternative oral replacement, 40 mEq, PRN    Or  potassium chloride, 10 mEq, PRN  promethazine, 12.5 mg, Q6H PRN    Or  ondansetron, 4 mg, Q6H PRN  oxyCODONE-acetaminophen, 1 tablet, Q4H PRN          Electronically signed by Ethan Monge MD on 9/30/2020 at 4:23 PM

## 2020-09-30 NOTE — CARE COORDINATION
CM faxed updated clinical documentation to [facility] to request an extension to the patient's stay in the swing bed program.

## 2020-09-30 NOTE — PLAN OF CARE
Problem: Falls - Risk of:  Goal: Will remain free from falls  Description: Will remain free from falls  Outcome: Ongoing  Goal: Absence of physical injury  Description: Absence of physical injury  Outcome: Ongoing     Problem: SAFETY  Goal: LTG - Patient will demonstrate safety requirements appropriate to situation/environment  Outcome: Ongoing  Goal: LTG - patient will utilize safety techniques  Outcome: Ongoing  Goal: STG - patient locks brakes on wheelchair  Outcome: Ongoing  Goal: STG - Patient uses call light consistently to request assistance with transfers  Outcome: Ongoing  Goal: STG - patient uses gait belt during all transfers  Outcome: Ongoing     Problem: Pain:  Description: Pain management should include both nonpharmacologic and pharmacologic interventions.   Goal: Pain level will decrease  Description: Pain level will decrease  Outcome: Ongoing  Goal: Control of acute pain  Description: Control of acute pain  Outcome: Ongoing  Goal: Control of chronic pain  Description: Control of chronic pain  Outcome: Ongoing

## 2020-10-01 ENCOUNTER — APPOINTMENT (OUTPATIENT)
Dept: GENERAL RADIOLOGY | Age: 65
DRG: 560 | End: 2020-10-01
Attending: INTERNAL MEDICINE
Payer: COMMERCIAL

## 2020-10-01 LAB
ANION GAP SERPL CALCULATED.3IONS-SCNC: 11 MMOL/L (ref 4–16)
BASOPHILS ABSOLUTE: 0 K/CU MM
BASOPHILS RELATIVE PERCENT: 0.4 % (ref 0–1)
BUN BLDV-MCNC: 12 MG/DL (ref 6–23)
CALCIUM SERPL-MCNC: 8.7 MG/DL (ref 8.3–10.6)
CHLORIDE BLD-SCNC: 101 MMOL/L (ref 99–110)
CO2: 28 MMOL/L (ref 21–32)
CREAT SERPL-MCNC: 0.7 MG/DL (ref 0.6–1.1)
DIFFERENTIAL TYPE: ABNORMAL
EOSINOPHILS ABSOLUTE: 0.3 K/CU MM
EOSINOPHILS RELATIVE PERCENT: 2.4 % (ref 0–3)
GFR AFRICAN AMERICAN: >60 ML/MIN/1.73M2
GFR NON-AFRICAN AMERICAN: >60 ML/MIN/1.73M2
GLUCOSE BLD-MCNC: 125 MG/DL (ref 70–99)
GLUCOSE BLD-MCNC: 130 MG/DL (ref 70–99)
GLUCOSE BLD-MCNC: 130 MG/DL (ref 70–99)
GLUCOSE BLD-MCNC: 131 MG/DL (ref 70–99)
GLUCOSE BLD-MCNC: 141 MG/DL (ref 70–99)
HCT VFR BLD CALC: 28.5 % (ref 37–47)
HEMOGLOBIN: 8.7 GM/DL (ref 12.5–16)
IMMATURE NEUTROPHIL %: 1.5 % (ref 0–0.43)
LYMPHOCYTES ABSOLUTE: 2.6 K/CU MM
LYMPHOCYTES RELATIVE PERCENT: 24.6 % (ref 24–44)
MCH RBC QN AUTO: 29.2 PG (ref 27–31)
MCHC RBC AUTO-ENTMCNC: 30.5 % (ref 32–36)
MCV RBC AUTO: 95.6 FL (ref 78–100)
MONOCYTES ABSOLUTE: 0.7 K/CU MM
MONOCYTES RELATIVE PERCENT: 6.9 % (ref 0–4)
PDW BLD-RTO: 15 % (ref 11.7–14.9)
PLATELET # BLD: 472 K/CU MM (ref 140–440)
PMV BLD AUTO: 9.3 FL (ref 7.5–11.1)
POTASSIUM SERPL-SCNC: 4 MMOL/L (ref 3.5–5.1)
RBC # BLD: 2.98 M/CU MM (ref 4.2–5.4)
SEGMENTED NEUTROPHILS ABSOLUTE COUNT: 6.6 K/CU MM
SEGMENTED NEUTROPHILS RELATIVE PERCENT: 64.2 % (ref 36–66)
SODIUM BLD-SCNC: 140 MMOL/L (ref 135–145)
TOTAL IMMATURE NEUTOROPHIL: 0.16 K/CU MM
WBC # BLD: 10.4 K/CU MM (ref 4–10.5)

## 2020-10-01 PROCEDURE — 36415 COLL VENOUS BLD VENIPUNCTURE: CPT

## 2020-10-01 PROCEDURE — 97110 THERAPEUTIC EXERCISES: CPT

## 2020-10-01 PROCEDURE — 1200000002 HC SEMI PRIVATE SWING BED

## 2020-10-01 PROCEDURE — 80048 BASIC METABOLIC PNL TOTAL CA: CPT

## 2020-10-01 PROCEDURE — 82962 GLUCOSE BLOOD TEST: CPT

## 2020-10-01 PROCEDURE — 85025 COMPLETE CBC W/AUTO DIFF WBC: CPT

## 2020-10-01 PROCEDURE — 97116 GAIT TRAINING THERAPY: CPT

## 2020-10-01 PROCEDURE — 97530 THERAPEUTIC ACTIVITIES: CPT

## 2020-10-01 PROCEDURE — 6370000000 HC RX 637 (ALT 250 FOR IP): Performed by: INTERNAL MEDICINE

## 2020-10-01 PROCEDURE — 6360000002 HC RX W HCPCS: Performed by: INTERNAL MEDICINE

## 2020-10-01 PROCEDURE — 72080 X-RAY EXAM THORACOLMB 2/> VW: CPT

## 2020-10-01 RX ADMIN — OXYCODONE AND ACETAMINOPHEN 1 TABLET: 5; 325 TABLET ORAL at 09:51

## 2020-10-01 RX ADMIN — GLIPIZIDE 10 MG: 10 TABLET, FILM COATED, EXTENDED RELEASE ORAL at 09:09

## 2020-10-01 RX ADMIN — INSULIN LISPRO 5 UNITS: 100 INJECTION, SOLUTION INTRAVENOUS; SUBCUTANEOUS at 17:53

## 2020-10-01 RX ADMIN — LOSARTAN POTASSIUM 50 MG: 50 TABLET, FILM COATED ORAL at 22:07

## 2020-10-01 RX ADMIN — INSULIN LISPRO 5 UNITS: 100 INJECTION, SOLUTION INTRAVENOUS; SUBCUTANEOUS at 12:54

## 2020-10-01 RX ADMIN — FAMOTIDINE 20 MG: 20 TABLET ORAL at 22:06

## 2020-10-01 RX ADMIN — ENOXAPARIN SODIUM 40 MG: 40 INJECTION SUBCUTANEOUS at 09:11

## 2020-10-01 RX ADMIN — CYCLOBENZAPRINE 10 MG: 10 TABLET, FILM COATED ORAL at 12:52

## 2020-10-01 RX ADMIN — ACETAMINOPHEN 650 MG: 325 TABLET ORAL at 12:52

## 2020-10-01 RX ADMIN — FAMOTIDINE 20 MG: 20 TABLET ORAL at 09:09

## 2020-10-01 RX ADMIN — DICLOFENAC 2 G: 10 GEL TOPICAL at 21:57

## 2020-10-01 RX ADMIN — INSULIN GLARGINE 20 UNITS: 100 INJECTION, SOLUTION SUBCUTANEOUS at 22:08

## 2020-10-01 RX ADMIN — LOSARTAN POTASSIUM 50 MG: 50 TABLET, FILM COATED ORAL at 09:09

## 2020-10-01 RX ADMIN — OXYCODONE AND ACETAMINOPHEN 1 TABLET: 5; 325 TABLET ORAL at 21:27

## 2020-10-01 RX ADMIN — DOCUSATE SODIUM 50 MG AND SENNOSIDES 8.6 MG 1 TABLET: 8.6; 5 TABLET, FILM COATED ORAL at 22:06

## 2020-10-01 RX ADMIN — CYCLOBENZAPRINE 10 MG: 10 TABLET, FILM COATED ORAL at 22:06

## 2020-10-01 RX ADMIN — OXYCODONE AND ACETAMINOPHEN 1 TABLET: 5; 325 TABLET ORAL at 04:27

## 2020-10-01 RX ADMIN — OXYCODONE AND ACETAMINOPHEN 1 TABLET: 5; 325 TABLET ORAL at 16:00

## 2020-10-01 RX ADMIN — DICLOFENAC 2 G: 10 GEL TOPICAL at 09:22

## 2020-10-01 RX ADMIN — DOCUSATE SODIUM 50 MG AND SENNOSIDES 8.6 MG 1 TABLET: 8.6; 5 TABLET, FILM COATED ORAL at 09:09

## 2020-10-01 RX ADMIN — ACETAMINOPHEN 650 MG: 325 TABLET ORAL at 18:29

## 2020-10-01 RX ADMIN — INSULIN LISPRO 5 UNITS: 100 INJECTION, SOLUTION INTRAVENOUS; SUBCUTANEOUS at 09:19

## 2020-10-01 ASSESSMENT — PAIN DESCRIPTION - PAIN TYPE
TYPE: CHRONIC PAIN
TYPE: SURGICAL PAIN;CHRONIC PAIN
TYPE: SURGICAL PAIN;CHRONIC PAIN
TYPE: CHRONIC PAIN;SURGICAL PAIN
TYPE: CHRONIC PAIN
TYPE: CHRONIC PAIN
TYPE: CHRONIC PAIN;SURGICAL PAIN
TYPE: SURGICAL PAIN;CHRONIC PAIN
TYPE: CHRONIC PAIN
TYPE: CHRONIC PAIN;SURGICAL PAIN
TYPE: CHRONIC PAIN;SURGICAL PAIN
TYPE: CHRONIC PAIN
TYPE: CHRONIC PAIN;SURGICAL PAIN

## 2020-10-01 ASSESSMENT — PAIN DESCRIPTION - FREQUENCY
FREQUENCY: CONTINUOUS

## 2020-10-01 ASSESSMENT — PAIN DESCRIPTION - ORIENTATION
ORIENTATION: RIGHT;LOWER
ORIENTATION: RIGHT
ORIENTATION: RIGHT;LOWER
ORIENTATION: RIGHT;LOWER
ORIENTATION: LOWER
ORIENTATION: RIGHT;LOWER

## 2020-10-01 ASSESSMENT — PAIN DESCRIPTION - LOCATION
LOCATION: BACK;HIP
LOCATION: BACK
LOCATION: BACK;HIP;LEG
LOCATION: BACK;HIP
LOCATION: BACK;LEG
LOCATION: BACK;HIP
LOCATION: BACK;HIP;LEG
LOCATION: BACK;HIP
LOCATION: BACK;HIP

## 2020-10-01 ASSESSMENT — PAIN - FUNCTIONAL ASSESSMENT
PAIN_FUNCTIONAL_ASSESSMENT: ACTIVITIES ARE NOT PREVENTED

## 2020-10-01 ASSESSMENT — PAIN DESCRIPTION - PROGRESSION
CLINICAL_PROGRESSION: GRADUALLY WORSENING
CLINICAL_PROGRESSION: GRADUALLY WORSENING
CLINICAL_PROGRESSION: NOT CHANGED
CLINICAL_PROGRESSION: GRADUALLY WORSENING
CLINICAL_PROGRESSION: GRADUALLY IMPROVING
CLINICAL_PROGRESSION: GRADUALLY IMPROVING
CLINICAL_PROGRESSION: NOT CHANGED
CLINICAL_PROGRESSION: GRADUALLY IMPROVING
CLINICAL_PROGRESSION: NOT CHANGED
CLINICAL_PROGRESSION: GRADUALLY IMPROVING

## 2020-10-01 ASSESSMENT — PAIN DESCRIPTION - ONSET
ONSET: ON-GOING

## 2020-10-01 ASSESSMENT — PAIN DESCRIPTION - DESCRIPTORS
DESCRIPTORS: ACHING

## 2020-10-01 ASSESSMENT — PAIN SCALES - GENERAL
PAINLEVEL_OUTOF10: 4
PAINLEVEL_OUTOF10: 5
PAINLEVEL_OUTOF10: 3
PAINLEVEL_OUTOF10: 5
PAINLEVEL_OUTOF10: 3
PAINLEVEL_OUTOF10: 4
PAINLEVEL_OUTOF10: 8
PAINLEVEL_OUTOF10: 5
PAINLEVEL_OUTOF10: 5
PAINLEVEL_OUTOF10: 8
PAINLEVEL_OUTOF10: 10
PAINLEVEL_OUTOF10: 5
PAINLEVEL_OUTOF10: 5
PAINLEVEL_OUTOF10: 6
PAINLEVEL_OUTOF10: 4
PAINLEVEL_OUTOF10: 3
PAINLEVEL_OUTOF10: 6

## 2020-10-01 NOTE — PROGRESS NOTES
Physical Therapy    Physical Therapy Treatment Note  Name: Alon Linares MRN: [id number] :   1955   Date:  10/1/2020   Admission Date: 2020 Room:  [address] [place] [address]   Restrictions/Precautions:  Restrictions/Precautions  Restrictions/Precautions: Weight Bearing, Fall Risk(spinal precautions)  Required Braces or Orthoses?: No Lower Extremity Weight Bearing Restrictions  Right Lower Extremity Weight Bearing: Toe Touch Weight Bearing     Communication with other providers:  Updated progress with team  Subjective:  Patient states:  \"I just got back from x-ray\"  Pain:   Location, Type, Intensity (0/10 to 10/10):  5/10 RLE  Objective:    Observation:  P supine in bed  Treatment, including education/measures: There. Ex: supine x 10 reps each-ankle pumps, quad sets, gluteal set, heel slide, SAQ over pillow, hip abd. P requires assist to unweight RLE with hip abd. P with reduced range in heel slides and SAQ  Seated knee flexion x 10 reps (decreased range)  Transfers: sit <> stand min A from bed, commode, chair x 2. P cued for TTWB. P cued for hand placement  Gait: Ambulates x 10', 60' with RW. RW adjusted height to improve posture. P with TTWB on RLE requires cues for step pattern. P with RLE IR in stepping. Cued for neutral p reports she has had the foot turn in since previous fall ~5 years prior. Rotation appears to come from tibia vs hip and my likely be premorbid  P left sitting up in chair with call light within reach. Assessment / Impression:    P with improved LE movement. Recommend continued skilled PT to address deficits in ROM, strength, balance and gait.    Patient's tolerance of treatment:  good   Adverse Reaction: pain  Significant change in status and impact:  Improved LE AROM  Barriers to improvement:  none  Plan for Next Session:    Work on strength, mobility  Time in:  1125  Time out:  1210  Timed treatment minutes:  45  Total treatment time:  45    Previously filed items:  Social/Functional History  Lives With: Alone  Type of Home: Apartment  Home Layout: One level  Home Access: Level entry  Bathroom Shower/Tub: Tub/Shower unit  Bathroom Toilet: Standard  Bathroom Equipment: Grab bars around toilet  Bathroom Accessibility: Accessible  Receives Help From: Family  ADL Assistance: Independent  Homemaking Assistance: Independent  Homemaking Responsibilities: Yes  Ambulation Assistance: Independent  Transfer Assistance: Independent  Active : Yes  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   Short term goal 4: P will ambulate x 150' with RW and Celso while maintaining TTWB on RLE       Electronically signed by:    Ruperto Case PT  10/1/2020, 12:54 PM

## 2020-10-01 NOTE — PROGRESS NOTES
Comprehensive Nutrition Assessment    Type and Reason for Visit:  Initial(Swing Bed)    Nutrition Recommendations/Plan: no new interventions    Nutrition Assessment:  Pt status post right femur ORIF, no identified weight changes, no chewing or swallowing problems, labs reviewed. Will continue with diabetic oral supplemen twice daily due to variable intakes and follow    Malnutrition Assessment:  Malnutrition Status:  Mild malnutrition    Context:  Acute Illness     Findings of the 6 clinical characteristics of malnutrition:      Estimated Daily Nutrient Needs:  Energy (kcal):  7369-4597; Weight Used for Energy Requirements:  Ideal     Protein (g):  64-77; Weight Used for Protein Requirements:  Ideal(1-1.2)        Fluid (ml/day):  1763-0295; Weight Used for Fluid Requirements:  Ideal(1ml/kcal)           Wounds:  Surgical Wound       Current Nutrition Therapies:    DIET CARB CONTROL; Carb Control: 4 carb choices (60 gms)/meal  Dietary Nutrition Supplements: Diabetic Oral Supplement    Anthropometric Measures:  · Height: 5' 8\" (172.7 cm)  · Current Body Weight: 231 lb (104.8 kg)   · Admission Body Weight: 231 lb (104.8 kg)    · Usual Body Weight: 220 lb (99.8 kg)(Reports per pt)     · Ideal Body Weight: 140 lbs; % Ideal Body Weight 165 %   · BMI: 35.1  · Adjusted Body Weight:  ; No Adjustment   · BMI Categories: Obese Class 2 (BMI 35.0 -39.9)       Nutrition Diagnosis:   · Increased nutrient needs related to acute injury/trauma as evidenced by intake 51-75%      Nutrition Interventions:   Food and/or Nutrient Delivery:  Start Oral Diet, Continue Oral Nutrition Supplement  Nutrition Education/Counseling:  No recommendation at this time   Coordination of Nutrition Care:  Continued Inpatient Monitoring    Goals:  Oral intakes of meals and supplements will improve to consistently 51-75% of most during her stay       Nutrition Monitoring and Evaluation:   Behavioral-Environmental Outcomes:   Other (Comment) Food/Nutrient Intake Outcomes:  Food and Nutrient Intake, Supplement Intake  Physical Signs/Symptoms Outcomes:  Biochemical Data, Skin, Weight, Fluid Status or Edema     Discharge Planning:     Too soon to determine     Electronically signed by Ge Munoz RD, LD on 10/1/20 at 4:36 PM EDT    Contact: [phone]

## 2020-10-01 NOTE — PROGRESS NOTES
Occupational Therapy    Occupational Therapy Treatment Note  Name: Shana Tadeo MRN: [id number] :   1955   Date:  10/1/2020   Admission Date: 2020 Room:  [facility]-01   Restrictions/Precautions:  Restrictions/Precautions  Restrictions/Precautions: Weight Bearing, Fall Risk(spinal precautions)  Required Braces or Orthoses?: No   Communication with other providers:  [id number] Christa Garcia to see per nurse  Subjective:  Patient states:  I need help getting off the commode  Pain:   Location, Type, Intensity (0/10 to 10/10): 7/10 in  Rleg  Objective:    Observation:  Pt was seen  On commode in am and in chair in pm  Objective Measures: transfers, exercise    Treatment, including education:  Pt was min A sit to stand from [facility] adhering to toe touch wt bearing R leg; Pt was Min A for wiping her bottom. She did SPT to her bed . Pt was CGA stand to sit on EOB. Pt was Mod A x1 for scooting to [facility]. Pt was up in recliner in PM.  Daughter and granddaughter were present. Pt did 10 reps of cane ex 2 lbs shoulder flex/ext, abd/add, elbow flex/ext, shoulder rowing, shoulder circles, wriist flex/ext. Pt left in recliner with daughters near playing card game  Assessment / Impression:        Patient's tolerance of treatment:good  Adverse Reaction: no  Significant change in status and impact:  no  Barriers to improvement:  Anxious with transfers  Plan for Next Session:    adls or transfers  Time in:  845  1700  Time out:  903  1710  Timed treatment minutes:  28  Total treatment time:  28  Electronically signed by:    Heidy aZyas,   10/1/2020, 1:06 PM    Previously filed values:  Patient Goals   Patient goals : To return home  Short term goals  Time Frame for Short term goals: Until discharge  Short term goal 1: Pt will complete all LB ADLs mod I using AE PRN and min cues for increased functional independence  Short term goal 2: Pt will complete all functional transfers with SBA and min cues for increased functional independence.   Short term goal 3: Pt will complete all aspects of toileting mod I for increased functional independence  Short term goal 4: Pt will participate in therex/act with focus on UE strengthening and home management with min cues for increased independence with IADL tasks

## 2020-10-01 NOTE — PROGRESS NOTES
SWING BED WEEKLY TEAM SHEET     WEEK#     1  NUTRITION   Diet:     Carb Controlled, diabetic supplement              TF:   no       TPN:   no  Appropriate/Adequate [X] yes [ ] no with supplements  Meal intakes: 26-75%    Weight: 231#   BMI:   35.21         Significant Change: no  Recommendations: no new at this time

## 2020-10-02 LAB
GLUCOSE BLD-MCNC: 114 MG/DL (ref 70–99)
GLUCOSE BLD-MCNC: 122 MG/DL (ref 70–99)
GLUCOSE BLD-MCNC: 132 MG/DL (ref 70–99)
GLUCOSE BLD-MCNC: 153 MG/DL (ref 70–99)

## 2020-10-02 PROCEDURE — 97530 THERAPEUTIC ACTIVITIES: CPT

## 2020-10-02 PROCEDURE — 97535 SELF CARE MNGMENT TRAINING: CPT

## 2020-10-02 PROCEDURE — 1200000002 HC SEMI PRIVATE SWING BED

## 2020-10-02 PROCEDURE — 82962 GLUCOSE BLOOD TEST: CPT

## 2020-10-02 PROCEDURE — 97116 GAIT TRAINING THERAPY: CPT

## 2020-10-02 PROCEDURE — 97110 THERAPEUTIC EXERCISES: CPT

## 2020-10-02 PROCEDURE — 6360000002 HC RX W HCPCS: Performed by: INTERNAL MEDICINE

## 2020-10-02 PROCEDURE — 6370000000 HC RX 637 (ALT 250 FOR IP): Performed by: INTERNAL MEDICINE

## 2020-10-02 RX ADMIN — CYCLOBENZAPRINE 10 MG: 10 TABLET, FILM COATED ORAL at 20:45

## 2020-10-02 RX ADMIN — LOSARTAN POTASSIUM 50 MG: 50 TABLET, FILM COATED ORAL at 20:45

## 2020-10-02 RX ADMIN — OXYCODONE AND ACETAMINOPHEN 1 TABLET: 5; 325 TABLET ORAL at 16:18

## 2020-10-02 RX ADMIN — INSULIN LISPRO 5 UNITS: 100 INJECTION, SOLUTION INTRAVENOUS; SUBCUTANEOUS at 17:05

## 2020-10-02 RX ADMIN — DOCUSATE SODIUM 50 MG AND SENNOSIDES 8.6 MG 1 TABLET: 8.6; 5 TABLET, FILM COATED ORAL at 20:45

## 2020-10-02 RX ADMIN — OXYCODONE AND ACETAMINOPHEN 1 TABLET: 5; 325 TABLET ORAL at 04:57

## 2020-10-02 RX ADMIN — FAMOTIDINE 20 MG: 20 TABLET ORAL at 09:22

## 2020-10-02 RX ADMIN — ACETAMINOPHEN 650 MG: 325 TABLET ORAL at 12:52

## 2020-10-02 RX ADMIN — GLIPIZIDE 10 MG: 10 TABLET, FILM COATED, EXTENDED RELEASE ORAL at 09:22

## 2020-10-02 RX ADMIN — FAMOTIDINE 20 MG: 20 TABLET ORAL at 20:45

## 2020-10-02 RX ADMIN — INSULIN GLARGINE 20 UNITS: 100 INJECTION, SOLUTION SUBCUTANEOUS at 20:45

## 2020-10-02 RX ADMIN — ACETAMINOPHEN 650 MG: 325 TABLET ORAL at 04:57

## 2020-10-02 RX ADMIN — ENOXAPARIN SODIUM 40 MG: 40 INJECTION SUBCUTANEOUS at 09:23

## 2020-10-02 RX ADMIN — DICLOFENAC 2 G: 10 GEL TOPICAL at 09:23

## 2020-10-02 RX ADMIN — LOSARTAN POTASSIUM 50 MG: 50 TABLET, FILM COATED ORAL at 09:22

## 2020-10-02 RX ADMIN — INSULIN LISPRO 2 UNITS: 100 INJECTION, SOLUTION INTRAVENOUS; SUBCUTANEOUS at 17:07

## 2020-10-02 RX ADMIN — OXYCODONE AND ACETAMINOPHEN 1 TABLET: 5; 325 TABLET ORAL at 09:52

## 2020-10-02 RX ADMIN — CYCLOBENZAPRINE 10 MG: 10 TABLET, FILM COATED ORAL at 09:22

## 2020-10-02 RX ADMIN — OXYCODONE AND ACETAMINOPHEN 1 TABLET: 5; 325 TABLET ORAL at 21:19

## 2020-10-02 RX ADMIN — INSULIN LISPRO 5 UNITS: 100 INJECTION, SOLUTION INTRAVENOUS; SUBCUTANEOUS at 12:54

## 2020-10-02 RX ADMIN — DICLOFENAC 2 G: 10 GEL TOPICAL at 20:50

## 2020-10-02 RX ADMIN — INSULIN LISPRO 5 UNITS: 100 INJECTION, SOLUTION INTRAVENOUS; SUBCUTANEOUS at 09:26

## 2020-10-02 RX ADMIN — ACETAMINOPHEN 650 MG: 325 TABLET ORAL at 18:18

## 2020-10-02 RX ADMIN — CYCLOBENZAPRINE 10 MG: 10 TABLET, FILM COATED ORAL at 12:51

## 2020-10-02 RX ADMIN — ACETAMINOPHEN 650 MG: 325 TABLET ORAL at 00:37

## 2020-10-02 ASSESSMENT — PAIN DESCRIPTION - ONSET
ONSET: GRADUAL
ONSET: ON-GOING

## 2020-10-02 ASSESSMENT — PAIN SCALES - GENERAL
PAINLEVEL_OUTOF10: 5
PAINLEVEL_OUTOF10: 4
PAINLEVEL_OUTOF10: 4
PAINLEVEL_OUTOF10: 3
PAINLEVEL_OUTOF10: 6
PAINLEVEL_OUTOF10: 4
PAINLEVEL_OUTOF10: 0
PAINLEVEL_OUTOF10: 6
PAINLEVEL_OUTOF10: 6
PAINLEVEL_OUTOF10: 4
PAINLEVEL_OUTOF10: 4
PAINLEVEL_OUTOF10: 6
PAINLEVEL_OUTOF10: 4
PAINLEVEL_OUTOF10: 7
PAINLEVEL_OUTOF10: 4
PAINLEVEL_OUTOF10: 5
PAINLEVEL_OUTOF10: 4

## 2020-10-02 ASSESSMENT — PAIN DESCRIPTION - PROGRESSION
CLINICAL_PROGRESSION: GRADUALLY IMPROVING
CLINICAL_PROGRESSION: GRADUALLY WORSENING
CLINICAL_PROGRESSION: NOT CHANGED
CLINICAL_PROGRESSION: GRADUALLY WORSENING
CLINICAL_PROGRESSION: GRADUALLY WORSENING
CLINICAL_PROGRESSION: NOT CHANGED
CLINICAL_PROGRESSION: GRADUALLY IMPROVING
CLINICAL_PROGRESSION: NOT CHANGED
CLINICAL_PROGRESSION: GRADUALLY WORSENING
CLINICAL_PROGRESSION: GRADUALLY IMPROVING
CLINICAL_PROGRESSION: NOT CHANGED
CLINICAL_PROGRESSION: GRADUALLY WORSENING
CLINICAL_PROGRESSION: NOT CHANGED
CLINICAL_PROGRESSION: GRADUALLY IMPROVING

## 2020-10-02 ASSESSMENT — PAIN DESCRIPTION - ORIENTATION
ORIENTATION: RIGHT;LOWER
ORIENTATION: RIGHT;LOWER
ORIENTATION: RIGHT
ORIENTATION: RIGHT;LOWER

## 2020-10-02 ASSESSMENT — PAIN DESCRIPTION - PAIN TYPE
TYPE: CHRONIC PAIN
TYPE: CHRONIC PAIN;SURGICAL PAIN
TYPE: CHRONIC PAIN
TYPE: CHRONIC PAIN;SURGICAL PAIN

## 2020-10-02 ASSESSMENT — PAIN DESCRIPTION - FREQUENCY
FREQUENCY: CONTINUOUS

## 2020-10-02 ASSESSMENT — PAIN DESCRIPTION - DESCRIPTORS
DESCRIPTORS: ACHING

## 2020-10-02 ASSESSMENT — PAIN DESCRIPTION - LOCATION
LOCATION: BACK;HIP
LOCATION: BACK;HIP
LOCATION: BACK;LEG
LOCATION: BACK;HIP
LOCATION: BACK;HIP
LOCATION: BACK;HIP;LEG
LOCATION: BACK;HIP
LOCATION: BACK;HIP;LEG

## 2020-10-02 ASSESSMENT — PAIN - FUNCTIONAL ASSESSMENT
PAIN_FUNCTIONAL_ASSESSMENT: ACTIVITIES ARE NOT PREVENTED

## 2020-10-02 NOTE — PROGRESS NOTES
Physical Therapy    Physical Therapy Treatment Note  Name: Mamadou Burciaga MRN: [id number] :   1955   Date:  10/2/2020   Admission Date: 2020 Room:  [address] [place] [address]   Restrictions/Precautions:  Restrictions/Precautions  Restrictions/Precautions: Weight Bearing, Fall Risk(spinal precautions)  Required Braces or Orthoses?: No Lower Extremity Weight Bearing Restrictions  Right Lower Extremity Weight Bearing: Toe Touch Weight Bearing     Communication with other providers:  Discussed spinal precautions with MD who is going to look into xray and determine  Subjective:  Patient states:  \"I got out of bed without help earlier\" P clarifies that nurse was present she just didn't need physical assist  Pain:   Location, Type, Intensity (0/10 to 1010):  410 R hip/low back  Objective:    Observation:  P supine in bed  Treatment, including education/measures: There. Ex: P performs exericises in long sit: JUAN ankle pumps, quad sets, gluteal sets x 15 reps, RLE heel slides x 10 reps x 2 sets, SAQ x 10 reps, hip abd x 15 reps with min A to unweight RLE  Standing R hip flexion x 10 reps to attempt to stretch hip, march x 3 reps on R unable to tolerate 2/2 pain  Transfers: sit <> stand from bed x 2 reps with min A  Bed mobility: supine to sit supervision with leg , sit to supine min A at RLE 2/2 pain  Gait: Ambulates with RW x 50' with CGA. P reports L knee soreness when ambulating requiring standing rest break. P educated on exercises and gait mechanics  P left supine in bed with bed alarm on and call light within reach  Assessment / Impression:    P continues to demonstrate deficits in strength, balance, ROM and mobility.  Recommend continued skilled PT to address deficits and maximize functional potential.   Patient's tolerance of treatment:  good   Adverse Reaction: pain  Significant change in status and impact:  Improved transfers  Barriers to improvement:  pain  Plan for Next Session:    Work on transfers, gait  Time in:  0911  Time out:  0944  Timed treatment minutes:  33  Total treatment time:  35    Previously filed items:  Social/Functional History  Lives With: Alone  Type of Home: Apartment  Home Layout: One level  Home Access: Level entry  Bathroom Shower/Tub: Tub/Shower unit  Bathroom Toilet: Standard  Bathroom Equipment: Grab bars around toilet  Bathroom Accessibility: Accessible  Receives Help From: Family  ADL Assistance: Independent  Homemaking Assistance: Independent  Homemaking Responsibilities: Yes  Ambulation Assistance: Independent  Transfer Assistance: Independent  Active : Yes  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   Short term goal 4: P will ambulate x 150' with RW and Celso while maintaining TTWB on RLE       Electronically signed by:    Slick Jeffery PT  10/2/2020, 10:21 AM

## 2020-10-02 NOTE — PROGRESS NOTES
Occupational Therapy    Occupational Therapy Treatment Note  Name: Olga Sotelo MRN: [id number] :   1955   Date:  10/2/2020   Admission Date: 2020 Room:  [facility]013-01   Restrictions/Precautions:  Restrictions/Precautions  Restrictions/Precautions: Weight Bearing, Fall Risk(spinal precautions)  Required Braces or Orthoses?: No   Communication with other providers:  Bashir Rosado to see per nurse  Subjective:  Patient states:  I am ready   Pain:   Location, Type, Intensity (0/10 to 10/10): 5/10 in RLE  Objective:    Observation:  Pt was sitting up in bed upon arrival    Treatment, including education:  Therapeutic Activity Training:   Therapeutic activity training was instructed today. Cues were given for safety, sequence, UE/LE placement, awareness, and balance. Activities performed today included functional mobility training, sup-sit, sit-stand, SPT. Transfers:   Supine>sit mod I using leg    Sit<>stand from EOB to RW with CGA. Sit <>stand from WC to RW 2 reps min A. Shower transfer: Pt ambulates to transfer bench with RW and sits CGA with min cues for hand placement. Pt requires CGA on RLE to rotate RLE into shower. Pt requires min A sit to stand from transfer bench to walker with min cues for hand placement. Self Care Training:   Cues were given for safety, sequence, UE/LE placement, visual cues, and balance. Activities performed today included dressing, bathing, and grooming. UE bathing: mod I using hand held shower head   LE bathing mod A for distal portions of bilateral LEs and buttock. Pt demos increased dynamic balance while standing in shower to perform pericare. Pt performs grooming routine with setup in recliner. Safety  Patient left safely in the chair with call light/phone in reach. Gait belt was used for transfers and gait.   Assessment / Impression:     Patient's tolerance of treatment:good  Adverse Reaction: no  Significant change in status and impact:  Increased dynamic balance in shower   Barriers to improvement:  pain  Plan for Next Session:    adls or transfers  Time in:  1147  Time out:  1237  Timed treatment minutes:50    Total treatment time:  50  Electronically signed by:    DAILY Montague  10/2/2020, 3:28 PM    Previously filed values:  Patient Goals   Patient goals : To return home  Short term goals  Time Frame for Short term goals: Until discharge  Short term goal 1: Pt will complete all LB ADLs mod I using AE PRN and min cues for increased functional independence  Short term goal 2: Pt will complete all functional transfers with SBA and min cues for increased functional independence.   Short term goal 3: Pt will complete all aspects of toileting mod I for increased functional independence  Short term goal 4: Pt will participate in therex/act with focus on UE strengthening and home management with min cues for increased independence with IADL tasks

## 2020-10-03 LAB
ANION GAP SERPL CALCULATED.3IONS-SCNC: 15 MMOL/L (ref 4–16)
BASOPHILS ABSOLUTE: 0 K/CU MM
BASOPHILS RELATIVE PERCENT: 0.4 % (ref 0–1)
BUN BLDV-MCNC: 11 MG/DL (ref 6–23)
CALCIUM SERPL-MCNC: 8.8 MG/DL (ref 8.3–10.6)
CHLORIDE BLD-SCNC: 103 MMOL/L (ref 99–110)
CO2: 23 MMOL/L (ref 21–32)
CREAT SERPL-MCNC: 0.7 MG/DL (ref 0.6–1.1)
DIFFERENTIAL TYPE: ABNORMAL
EOSINOPHILS ABSOLUTE: 0.3 K/CU MM
EOSINOPHILS RELATIVE PERCENT: 2.9 % (ref 0–3)
GFR AFRICAN AMERICAN: >60 ML/MIN/1.73M2
GFR NON-AFRICAN AMERICAN: >60 ML/MIN/1.73M2
GLUCOSE BLD-MCNC: 100 MG/DL (ref 70–99)
GLUCOSE BLD-MCNC: 109 MG/DL (ref 70–99)
GLUCOSE BLD-MCNC: 114 MG/DL (ref 70–99)
GLUCOSE BLD-MCNC: 240 MG/DL (ref 70–99)
GLUCOSE BLD-MCNC: 83 MG/DL (ref 70–99)
HCT VFR BLD CALC: 30.2 % (ref 37–47)
HEMOGLOBIN: 9.2 GM/DL (ref 12.5–16)
IMMATURE NEUTROPHIL %: 1.2 % (ref 0–0.43)
LYMPHOCYTES ABSOLUTE: 2.3 K/CU MM
LYMPHOCYTES RELATIVE PERCENT: 24.4 % (ref 24–44)
MCH RBC QN AUTO: 29 PG (ref 27–31)
MCHC RBC AUTO-ENTMCNC: 30.5 % (ref 32–36)
MCV RBC AUTO: 95.3 FL (ref 78–100)
MONOCYTES ABSOLUTE: 0.7 K/CU MM
MONOCYTES RELATIVE PERCENT: 7.7 % (ref 0–4)
PDW BLD-RTO: 15.5 % (ref 11.7–14.9)
PLATELET # BLD: 529 K/CU MM (ref 140–440)
PMV BLD AUTO: 9.2 FL (ref 7.5–11.1)
POTASSIUM SERPL-SCNC: 4 MMOL/L (ref 3.5–5.1)
RBC # BLD: 3.17 M/CU MM (ref 4.2–5.4)
SEGMENTED NEUTROPHILS ABSOLUTE COUNT: 5.9 K/CU MM
SEGMENTED NEUTROPHILS RELATIVE PERCENT: 63.4 % (ref 36–66)
SODIUM BLD-SCNC: 141 MMOL/L (ref 135–145)
TOTAL IMMATURE NEUTOROPHIL: 0.11 K/CU MM
WBC # BLD: 9.3 K/CU MM (ref 4–10.5)

## 2020-10-03 PROCEDURE — 97530 THERAPEUTIC ACTIVITIES: CPT

## 2020-10-03 PROCEDURE — 36415 COLL VENOUS BLD VENIPUNCTURE: CPT

## 2020-10-03 PROCEDURE — 82962 GLUCOSE BLOOD TEST: CPT

## 2020-10-03 PROCEDURE — 80048 BASIC METABOLIC PNL TOTAL CA: CPT

## 2020-10-03 PROCEDURE — 85025 COMPLETE CBC W/AUTO DIFF WBC: CPT

## 2020-10-03 PROCEDURE — 6370000000 HC RX 637 (ALT 250 FOR IP): Performed by: INTERNAL MEDICINE

## 2020-10-03 PROCEDURE — 94761 N-INVAS EAR/PLS OXIMETRY MLT: CPT

## 2020-10-03 PROCEDURE — 97110 THERAPEUTIC EXERCISES: CPT

## 2020-10-03 PROCEDURE — 6360000002 HC RX W HCPCS: Performed by: INTERNAL MEDICINE

## 2020-10-03 PROCEDURE — 1200000002 HC SEMI PRIVATE SWING BED

## 2020-10-03 RX ORDER — OXYCODONE HYDROCHLORIDE AND ACETAMINOPHEN 5; 325 MG/1; MG/1
1 TABLET ORAL EVERY 6 HOURS PRN
Status: DISCONTINUED | OUTPATIENT
Start: 2020-10-03 | End: 2020-10-16 | Stop reason: HOSPADM

## 2020-10-03 RX ADMIN — CYCLOBENZAPRINE 10 MG: 10 TABLET, FILM COATED ORAL at 13:25

## 2020-10-03 RX ADMIN — ENOXAPARIN SODIUM 40 MG: 40 INJECTION SUBCUTANEOUS at 09:07

## 2020-10-03 RX ADMIN — INSULIN GLARGINE 20 UNITS: 100 INJECTION, SOLUTION SUBCUTANEOUS at 21:21

## 2020-10-03 RX ADMIN — DOCUSATE SODIUM 50 MG AND SENNOSIDES 8.6 MG 1 TABLET: 8.6; 5 TABLET, FILM COATED ORAL at 09:06

## 2020-10-03 RX ADMIN — ACETAMINOPHEN 650 MG: 325 TABLET ORAL at 23:37

## 2020-10-03 RX ADMIN — CYCLOBENZAPRINE 10 MG: 10 TABLET, FILM COATED ORAL at 21:20

## 2020-10-03 RX ADMIN — OXYCODONE AND ACETAMINOPHEN 1 TABLET: 5; 325 TABLET ORAL at 16:13

## 2020-10-03 RX ADMIN — LOSARTAN POTASSIUM 50 MG: 50 TABLET, FILM COATED ORAL at 21:20

## 2020-10-03 RX ADMIN — CYCLOBENZAPRINE 10 MG: 10 TABLET, FILM COATED ORAL at 09:07

## 2020-10-03 RX ADMIN — FAMOTIDINE 20 MG: 20 TABLET ORAL at 09:07

## 2020-10-03 RX ADMIN — ACETAMINOPHEN 650 MG: 325 TABLET ORAL at 06:15

## 2020-10-03 RX ADMIN — GLIPIZIDE 10 MG: 10 TABLET, FILM COATED, EXTENDED RELEASE ORAL at 09:07

## 2020-10-03 RX ADMIN — DICLOFENAC 2 G: 10 GEL TOPICAL at 09:05

## 2020-10-03 RX ADMIN — DICLOFENAC 2 G: 10 GEL TOPICAL at 21:22

## 2020-10-03 RX ADMIN — DOCUSATE SODIUM 50 MG AND SENNOSIDES 8.6 MG 1 TABLET: 8.6; 5 TABLET, FILM COATED ORAL at 21:20

## 2020-10-03 RX ADMIN — ACETAMINOPHEN 650 MG: 325 TABLET ORAL at 17:50

## 2020-10-03 RX ADMIN — FAMOTIDINE 20 MG: 20 TABLET ORAL at 21:20

## 2020-10-03 RX ADMIN — LOSARTAN POTASSIUM 50 MG: 50 TABLET, FILM COATED ORAL at 09:07

## 2020-10-03 RX ADMIN — ACETAMINOPHEN 650 MG: 325 TABLET ORAL at 13:24

## 2020-10-03 RX ADMIN — INSULIN LISPRO 5 UNITS: 100 INJECTION, SOLUTION INTRAVENOUS; SUBCUTANEOUS at 09:09

## 2020-10-03 RX ADMIN — OXYCODONE AND ACETAMINOPHEN 1 TABLET: 5; 325 TABLET ORAL at 09:07

## 2020-10-03 RX ADMIN — OXYCODONE AND ACETAMINOPHEN 1 TABLET: 5; 325 TABLET ORAL at 02:17

## 2020-10-03 RX ADMIN — INSULIN LISPRO 2 UNITS: 100 INJECTION, SOLUTION INTRAVENOUS; SUBCUTANEOUS at 21:21

## 2020-10-03 RX ADMIN — INSULIN LISPRO 5 UNITS: 100 INJECTION, SOLUTION INTRAVENOUS; SUBCUTANEOUS at 13:25

## 2020-10-03 ASSESSMENT — PAIN SCALES - GENERAL
PAINLEVEL_OUTOF10: 4
PAINLEVEL_OUTOF10: 4
PAINLEVEL_OUTOF10: 2
PAINLEVEL_OUTOF10: 6
PAINLEVEL_OUTOF10: 8
PAINLEVEL_OUTOF10: 6
PAINLEVEL_OUTOF10: 5
PAINLEVEL_OUTOF10: 4

## 2020-10-03 ASSESSMENT — PAIN DESCRIPTION - DESCRIPTORS
DESCRIPTORS: ACHING

## 2020-10-03 ASSESSMENT — PAIN DESCRIPTION - LOCATION
LOCATION: HIP;LEG
LOCATION: HIP
LOCATION: HIP

## 2020-10-03 ASSESSMENT — PAIN DESCRIPTION - PAIN TYPE
TYPE: CHRONIC PAIN;SURGICAL PAIN
TYPE: SURGICAL PAIN
TYPE: SURGICAL PAIN

## 2020-10-03 ASSESSMENT — PAIN DESCRIPTION - ORIENTATION
ORIENTATION: RIGHT

## 2020-10-03 NOTE — FLOWSHEET NOTE
Physical Therapy DailyTreatment Note   Date: 10/3/2020 Room: [unfilled]   Name: Felicita Duran : 1955   MRN: [id number]   Admission Date:2020        Rehabilitation Diagnosis: Debility [R53.81]  Debility [R53.81]    Objective                                                                                    Goals:  (Update in navigator)  Short term goals  Time Frame for Short term goals: 3 weeks  Short term goal 1: P will perform bed mobility with Celso  Short term goal 2: Pt will perform sit <> stand to RW with Celso  Short term goal 3: P will perform standing activity with 1 UE support with Celso x 3 min to return to premorbid activities. Short term goal 4: P will ambulate x 150' with RW and Celso while maintaining TTWB on RLE:   :        Plan of Care                                                                              Times per week: 6+ days per week for a minimum of 15 minutes/day  Treatment to include Current Treatment Recommendations: Strengthening, ROM, Neuromuscular Re-education, Safety Education & Training, Balance Training, Endurance Training, Functional Mobility Training, Equipment Evaluation, Education, & procurement, Transfer Training, Gait Training, Stair training, Positioning, Pain Management    Date  10/3/2020   TIMES IN/OUT   2:10-2:35 pm   Restrictions/Precautions Restrictions/Precautions: Toe Touch Weight Bearing R LE, Fall Risk(spinal precautions)         Current Diet/Swallowing Issues DIET CARB CONTROL; Carb Control: 4 carb choices (60 gms)/meal  Dietary Nutrition Supplements: Diabetic Oral Supplement   Communication with other providers: [x] Ok to see per nursing   [] Medical hold and reason  [] Spoke with (team member) regarding   Subjective observations: Pt presented with her call light on and reports she needs to use the restroom. She also reports her sister is on her way to visit.   [x]   Gait belt used during tx session   Pain level/location: Pre-tx 5/10 in low back  Post-tx 5-6/10   Bed Mobility     Not performed   Transfers Sit to stand min A  Stand to sit CGA  Commode CGA   Standing Tolerance Pt stood only briefly for A with rubia care   Amb/Locomotion: AD/Distance/Assist Pt ambulated 55 feet with CGA using RW and TTWB. At times pt appeared to put > TTWB through R LE especially her first few steps and her last several when she was tired. Pt had to pause several times to rest briefly while walking   Steps (#)/Assist/Rails/AD Not performed   Ramp:AD/Assist Not performed   Curb:height AD/Assist Not performed   Strategies that improved performance: Pt required small frequent rest breaks and stool under her R foot while on the commode   Barriers to progress/participation: none   Alarm placed/where? Fall Risk  [ ] left in bed  [x] left in chair [x] call light within reach  [ ] bed alarm on  [ ] personal alarm on [ ] [de-identified]  [ ] other staff present:   Discharge recommendations  Anticipated discharge date:  TBD  Destination: []home alone   [x]home alone with assist prn  []Continuous supervision  []SNF  [] Assisted living   Continued therapy: []HHC PT  []OUTPATIENT  PT   [x]  Further PT  Equipment needs: See eval     Therapeutic activities/exercises completed this date: Pt demonstrated genuvalgum at R knee, R LE IR at times, and leaning away from R side with decreased weight on R hip in sitting. Pt very reluctant to flex knee with significant lack of knee ROM.      Modality/intervention used:   [x] Therapeutic Exercise    [ ] Modalities:   [x] Therapeutic Activity    [ ] Ultrasound   [ ] Elec Stim   [x] Gait Training     [ ] Cervical Traction  [ ] Lumbar Traction   [ ] Neuromuscular Re-education   [ ] Cold/hotpack  [ ] Iontophoresis   [x] Instruction in HEP     [ ] Vasopneumatic   [ ] Manual Therapy   [ ] Aquatic Therapy     Patient/Caregiver Education and Training: Pt educated to add to exercises she is doing on her own and to ask to ambulate again later tonight and a couple times tomorrow with staff.     Exercise/Equipment/Modalities this date   Ankle pumps: 1x10 B LE DF/PF and eversion   glute squeezes: 1x10 with 5\" hold   Quad set: 1x10 with 3\" hold B LE   Heel slides: 1x10 R LE   Seated marching: 1x10 R LE with poor foot clearance. Treatment Plan for Next Session: continue per POC, increase standing balance time.      Assessment / Impression                                                          Treatment/Activity Tolerance:   [x] Tolerated Treatment well:     [] Patient limited by fatigue/pain:       [] Patient limited by medical complications:    [] Adverse Reaction to Tx:   [] Significant change in status    Plan:   [x] Continue per plan of care [ ] Agnieszka Brito current plan   [ ] Plan of care initiated [ ] Hold pending MD visit [ ] Discharged    Billing:  Billed units: 1 therapeutic exercise, 1 therapeutic activity       Signed: Mirella Squires PT DPT [id number]  10/3/2020, 2:14 PM

## 2020-10-03 NOTE — PLAN OF CARE
Problem: Falls - Risk of:  Goal: Will remain free from falls  10/3/2020 1153 by Quita Moss RN  Outcome: Ongoing  10/2/2020 2346 by Cami Haile LPN  Outcome: Ongoing  Goal: Absence of physical injury  10/3/2020 1153 by Quita Moss RN  Outcome: Ongoing  10/2/2020 2346 by Cami Haile LPN  Outcome: Ongoing     Problem: SAFETY  Goal: LTG - Patient will demonstrate safety requirements appropriate to situation/environment  10/3/2020 1153 by Quita Moss RN  Outcome: Ongoing  10/2/2020 2346 by Cami Haile LPN  Outcome: Ongoing  Goal: LTG - patient will utilize safety techniques  10/3/2020 1153 by Quita Moss RN  Outcome: Ongoing  10/2/2020 2346 by Cami Haile LPN  Outcome: Ongoing  Goal: STG - patient locks brakes on wheelchair  10/3/2020 1153 by Quita Moss RN  Outcome: Ongoing  10/2/2020 2346 by Cami Haile LPN  Outcome: Ongoing  Goal: STG - Patient uses call light consistently to request assistance with transfers  10/3/2020 1153 by Quita Moss RN  Outcome: Ongoing  10/2/2020 2346 by Cami Haile LPN  Outcome: Ongoing  Goal: STG - patient uses gait belt during all transfers  10/3/2020 1153 by Quita Moss RN  Outcome: Ongoing  10/2/2020 2346 by Cami Haile LPN  Outcome: Ongoing     Problem: Pain:  Goal: Pain level will decrease  10/3/2020 1153 by Quita Moss RN  Outcome: Ongoing  10/2/2020 2346 by Cami Haile LPN  Outcome: Ongoing  Goal: Control of acute pain  10/3/2020 1153 by Quita Moss RN  Outcome: Ongoing  10/2/2020 2346 by Cami Haile LPN  Outcome: Ongoing  Goal: Control of chronic pain  10/3/2020 1153 by Quita Moss RN  Outcome: Ongoing  10/2/2020 2346 by Cami Haile LPN  Outcome: Ongoing

## 2020-10-03 NOTE — PLAN OF CARE
Problem: Falls - Risk of:  Goal: Will remain free from falls  Description: Will remain free from falls  Outcome: Ongoing  Goal: Absence of physical injury  Description: Absence of physical injury  Outcome: Ongoing     Problem: SAFETY  Goal: LTG - Patient will demonstrate safety requirements appropriate to situation/environment  Outcome: Ongoing  Goal: LTG - patient will utilize safety techniques  Outcome: Ongoing  Goal: STG - patient locks brakes on wheelchair  Outcome: Ongoing  Goal: STG - Patient uses call light consistently to request assistance with transfers  Outcome: Ongoing  Goal: STG - patient uses gait belt during all transfers  Outcome: Ongoing     Problem: Pain:  Goal: Pain level will decrease  Description: Pain level will decrease  Outcome: Ongoing  Goal: Control of acute pain  Description: Control of acute pain  Outcome: Ongoing  Goal: Control of chronic pain  Description: Control of chronic pain  Outcome: Ongoing

## 2020-10-03 NOTE — PLAN OF CARE
Problem: Falls - Risk of:  Goal: Will remain free from falls  Description: Will remain free from falls  10/3/2020 1945 by Gladis Will RN  Outcome: Ongoing  10/3/2020 1153 by Rahul Ponce RN  Outcome: Ongoing  Goal: Absence of physical injury  Description: Absence of physical injury  10/3/2020 1945 by Gladis Will RN  Outcome: Ongoing  10/3/2020 1153 by Rahul Ponce RN  Outcome: Ongoing     Problem: SAFETY  Goal: LTG - Patient will demonstrate safety requirements appropriate to situation/environment  10/3/2020 1945 by Gladis Will RN  Outcome: Ongoing  10/3/2020 1153 by Rahul Ponce RN  Outcome: Ongoing  Goal: LTG - patient will utilize safety techniques  10/3/2020 1945 by Gladis Will RN  Outcome: Ongoing  10/3/2020 1153 by Rahul Ponce RN  Outcome: Ongoing  Goal: STG - patient locks brakes on wheelchair  10/3/2020 1945 by Gladis Will RN  Outcome: Ongoing  10/3/2020 1153 by Rahul Ponce RN  Outcome: Ongoing  Goal: STG - Patient uses call light consistently to request assistance with transfers  10/3/2020 1945 by Gladis Will RN  Outcome: Ongoing  10/3/2020 1153 by Rahul Ponce RN  Outcome: Ongoing  Goal: STG - patient uses gait belt during all transfers  10/3/2020 1945 by Gladis Will RN  Outcome: Ongoing  10/3/2020 1153 by Rahul Ponce RN  Outcome: Ongoing     Problem: Pain:  Goal: Pain level will decrease  Description: Pain level will decrease  10/3/2020 1945 by Gladis Will RN  Outcome: Ongoing  10/3/2020 1153 by Rahul Ponce RN  Outcome: Ongoing  Goal: Control of acute pain  Description: Control of acute pain  10/3/2020 1945 by Gladis Will RN  Outcome: Ongoing  10/3/2020 1153 by Rahul Ponce RN  Outcome: Ongoing  Goal: Control of chronic pain  Description: Control of chronic pain  10/3/2020 1945 by Gladis Will RN  Outcome: Ongoing  10/3/2020 1153 by Rahul Ponce RN  Outcome: Ongoing

## 2020-10-04 LAB
GLUCOSE BLD-MCNC: 100 MG/DL (ref 70–99)
GLUCOSE BLD-MCNC: 110 MG/DL (ref 70–99)
GLUCOSE BLD-MCNC: 164 MG/DL (ref 70–99)
GLUCOSE BLD-MCNC: 185 MG/DL (ref 70–99)

## 2020-10-04 PROCEDURE — 82962 GLUCOSE BLOOD TEST: CPT

## 2020-10-04 PROCEDURE — 6370000000 HC RX 637 (ALT 250 FOR IP): Performed by: INTERNAL MEDICINE

## 2020-10-04 PROCEDURE — 6360000002 HC RX W HCPCS: Performed by: INTERNAL MEDICINE

## 2020-10-04 PROCEDURE — 1200000002 HC SEMI PRIVATE SWING BED

## 2020-10-04 RX ADMIN — DOCUSATE SODIUM 50 MG AND SENNOSIDES 8.6 MG 1 TABLET: 8.6; 5 TABLET, FILM COATED ORAL at 09:26

## 2020-10-04 RX ADMIN — DOCUSATE SODIUM 50 MG AND SENNOSIDES 8.6 MG 1 TABLET: 8.6; 5 TABLET, FILM COATED ORAL at 21:42

## 2020-10-04 RX ADMIN — OXYCODONE AND ACETAMINOPHEN 1 TABLET: 5; 325 TABLET ORAL at 22:50

## 2020-10-04 RX ADMIN — INSULIN LISPRO 2 UNITS: 100 INJECTION, SOLUTION INTRAVENOUS; SUBCUTANEOUS at 16:52

## 2020-10-04 RX ADMIN — FAMOTIDINE 20 MG: 20 TABLET ORAL at 09:26

## 2020-10-04 RX ADMIN — ACETAMINOPHEN 650 MG: 325 TABLET ORAL at 06:19

## 2020-10-04 RX ADMIN — DICLOFENAC 2 G: 10 GEL TOPICAL at 09:25

## 2020-10-04 RX ADMIN — CYCLOBENZAPRINE 10 MG: 10 TABLET, FILM COATED ORAL at 13:30

## 2020-10-04 RX ADMIN — OXYCODONE AND ACETAMINOPHEN 1 TABLET: 5; 325 TABLET ORAL at 09:27

## 2020-10-04 RX ADMIN — OXYCODONE AND ACETAMINOPHEN 1 TABLET: 5; 325 TABLET ORAL at 16:51

## 2020-10-04 RX ADMIN — ACETAMINOPHEN 650 MG: 325 TABLET ORAL at 12:30

## 2020-10-04 RX ADMIN — LOSARTAN POTASSIUM 50 MG: 50 TABLET, FILM COATED ORAL at 21:42

## 2020-10-04 RX ADMIN — INSULIN LISPRO 2 UNITS: 100 INJECTION, SOLUTION INTRAVENOUS; SUBCUTANEOUS at 12:29

## 2020-10-04 RX ADMIN — GLIPIZIDE 10 MG: 10 TABLET, FILM COATED, EXTENDED RELEASE ORAL at 09:30

## 2020-10-04 RX ADMIN — DICLOFENAC 2 G: 10 GEL TOPICAL at 21:42

## 2020-10-04 RX ADMIN — FAMOTIDINE 20 MG: 20 TABLET ORAL at 21:42

## 2020-10-04 RX ADMIN — ACETAMINOPHEN 650 MG: 325 TABLET ORAL at 18:37

## 2020-10-04 RX ADMIN — CYCLOBENZAPRINE 10 MG: 10 TABLET, FILM COATED ORAL at 09:26

## 2020-10-04 RX ADMIN — LOSARTAN POTASSIUM 50 MG: 50 TABLET, FILM COATED ORAL at 09:26

## 2020-10-04 RX ADMIN — CYCLOBENZAPRINE 10 MG: 10 TABLET, FILM COATED ORAL at 21:42

## 2020-10-04 RX ADMIN — ENOXAPARIN SODIUM 40 MG: 40 INJECTION SUBCUTANEOUS at 09:26

## 2020-10-04 RX ADMIN — INSULIN GLARGINE 20 UNITS: 100 INJECTION, SOLUTION SUBCUTANEOUS at 21:42

## 2020-10-04 ASSESSMENT — PAIN DESCRIPTION - DESCRIPTORS
DESCRIPTORS: ACHING

## 2020-10-04 ASSESSMENT — PAIN DESCRIPTION - LOCATION
LOCATION: HIP;LEG

## 2020-10-04 ASSESSMENT — PAIN DESCRIPTION - ORIENTATION
ORIENTATION: RIGHT

## 2020-10-04 ASSESSMENT — PAIN SCALES - GENERAL
PAINLEVEL_OUTOF10: 4
PAINLEVEL_OUTOF10: 5
PAINLEVEL_OUTOF10: 4
PAINLEVEL_OUTOF10: 6
PAINLEVEL_OUTOF10: 3
PAINLEVEL_OUTOF10: 6

## 2020-10-04 ASSESSMENT — PAIN DESCRIPTION - PAIN TYPE
TYPE: SURGICAL PAIN

## 2020-10-04 NOTE — PLAN OF CARE
Problem: Falls - Risk of:  Goal: Will remain free from falls  Outcome: Ongoing  Goal: Absence of physical injury  Outcome: Ongoing     Problem: SAFETY  Goal: LTG - Patient will demonstrate safety requirements appropriate to situation/environment  Outcome: Ongoing  Goal: LTG - patient will utilize safety techniques  Outcome: Ongoing  Goal: STG - patient locks brakes on wheelchair  Outcome: Ongoing  Goal: STG - Patient uses call light consistently to request assistance with transfers  Outcome: Ongoing  Goal: STG - patient uses gait belt during all transfers  Outcome: Ongoing     Problem: Pain:  Goal: Pain level will decrease  Outcome: Ongoing  Goal: Control of acute pain  Outcome: Ongoing  Goal: Control of chronic pain  Outcome: Ongoing

## 2020-10-04 NOTE — PROGRESS NOTES
Physician Progress Note      Fanny Booker  CSN #:                  [id number]  :                       1955  ADMIT DATE:       2020 6:41 PM  [address] [place] DATE:        2020 2:16 PM  RESPONDING  PROVIDER #:        Claudio Ramirez MD          QUERY TEXT:    Patient admitted with fall and s/p surgery . Noted documentation of acute   respiratory failure in notes on  Please indicate one of the following and   document in the medical record: The medical record reflects the following:  Risk Factors: age 76 s/p surgery  Clinical Indicators: possible atelectasis documented, developed hypoxia noted   one finding of 85% then quickly resolved with 2 L of oxygen per NC. Fever and   tachycardia developed. Please add more respiratory clinical indicators to   support Acute respiratory failure. Treatment: 2 Liters of oxygen / NC. Telemetry, Monitor labs. Acute Respiratory Failure Clinical Indicators per [facility] Training Guide and   Quick Reference Guide:  pO2 < 60 mmHg or SpO2 (pulse oximetry) < 91% breathing room air  pCO2 > 50 and pH < 7.35  P/F ratio (pO2 / FIO2) < 300  pO2 decrease or pCO2 increase by 10 mmHg from baseline (if known)  Supplemental oxygen of 40% or more  Presence of respiratory distress, tachypnea, dyspnea, shortness of breath,   wheezing  Unable to speak in complete sentences  Use of accessory muscles to breathe  Extreme anxiety and feeling of impending doom  Tripod position  Confusion/altered mental status/obtunded  Thank you,  Venessa Ramirez, MSN, CDS  Options provided:  -- Acute Respiratory Failure currently as evidenced by, Please document   evidence.   -- Acute Respiratory Failure ruled out after study  -- Hypoxia only  -- Other - I will add my own diagnosis  -- Disagree - Not applicable / Not valid  -- Disagree - Clinically unable to determine / Unknown  -- Refer to Clinical Documentation Reviewer    PROVIDER RESPONSE TEXT:    This patient has hypoxia only    Query created by: Adelina Farrell on 9/28/2020 7:10 AM      Electronically signed by:  Cinthia Hernandez MD 10/4/2020 5:43 PM

## 2020-10-05 LAB
ANION GAP SERPL CALCULATED.3IONS-SCNC: 11 MMOL/L (ref 4–16)
BASOPHILS ABSOLUTE: 0 K/CU MM
BASOPHILS RELATIVE PERCENT: 0.3 % (ref 0–1)
BUN BLDV-MCNC: 10 MG/DL (ref 6–23)
CALCIUM SERPL-MCNC: 8.8 MG/DL (ref 8.3–10.6)
CHLORIDE BLD-SCNC: 104 MMOL/L (ref 99–110)
CO2: 25 MMOL/L (ref 21–32)
CREAT SERPL-MCNC: 0.7 MG/DL (ref 0.6–1.1)
DIFFERENTIAL TYPE: ABNORMAL
EOSINOPHILS ABSOLUTE: 0.3 K/CU MM
EOSINOPHILS RELATIVE PERCENT: 3.3 % (ref 0–3)
GFR AFRICAN AMERICAN: >60 ML/MIN/1.73M2
GFR NON-AFRICAN AMERICAN: >60 ML/MIN/1.73M2
GLUCOSE BLD-MCNC: 101 MG/DL (ref 70–99)
GLUCOSE BLD-MCNC: 117 MG/DL (ref 70–99)
GLUCOSE BLD-MCNC: 162 MG/DL (ref 70–99)
GLUCOSE BLD-MCNC: 181 MG/DL (ref 70–99)
GLUCOSE BLD-MCNC: 97 MG/DL (ref 70–99)
HCT VFR BLD CALC: 31.4 % (ref 37–47)
HEMOGLOBIN: 9.4 GM/DL (ref 12.5–16)
IMMATURE NEUTROPHIL %: 0.7 % (ref 0–0.43)
LYMPHOCYTES ABSOLUTE: 2.1 K/CU MM
LYMPHOCYTES RELATIVE PERCENT: 24.5 % (ref 24–44)
MCH RBC QN AUTO: 28.8 PG (ref 27–31)
MCHC RBC AUTO-ENTMCNC: 29.9 % (ref 32–36)
MCV RBC AUTO: 96.3 FL (ref 78–100)
MONOCYTES ABSOLUTE: 0.6 K/CU MM
MONOCYTES RELATIVE PERCENT: 6.6 % (ref 0–4)
PDW BLD-RTO: 15.8 % (ref 11.7–14.9)
PLATELET # BLD: 581 K/CU MM (ref 140–440)
PMV BLD AUTO: 9 FL (ref 7.5–11.1)
POTASSIUM SERPL-SCNC: 4.3 MMOL/L (ref 3.5–5.1)
RBC # BLD: 3.26 M/CU MM (ref 4.2–5.4)
SEGMENTED NEUTROPHILS ABSOLUTE COUNT: 5.7 K/CU MM
SEGMENTED NEUTROPHILS RELATIVE PERCENT: 64.6 % (ref 36–66)
SODIUM BLD-SCNC: 140 MMOL/L (ref 135–145)
TOTAL IMMATURE NEUTOROPHIL: 0.06 K/CU MM
WBC # BLD: 8.8 K/CU MM (ref 4–10.5)

## 2020-10-05 PROCEDURE — 6360000002 HC RX W HCPCS: Performed by: INTERNAL MEDICINE

## 2020-10-05 PROCEDURE — 36415 COLL VENOUS BLD VENIPUNCTURE: CPT

## 2020-10-05 PROCEDURE — 97110 THERAPEUTIC EXERCISES: CPT

## 2020-10-05 PROCEDURE — 97530 THERAPEUTIC ACTIVITIES: CPT

## 2020-10-05 PROCEDURE — 6370000000 HC RX 637 (ALT 250 FOR IP): Performed by: INTERNAL MEDICINE

## 2020-10-05 PROCEDURE — 82962 GLUCOSE BLOOD TEST: CPT

## 2020-10-05 PROCEDURE — 97116 GAIT TRAINING THERAPY: CPT

## 2020-10-05 PROCEDURE — 97535 SELF CARE MNGMENT TRAINING: CPT

## 2020-10-05 PROCEDURE — 1200000002 HC SEMI PRIVATE SWING BED

## 2020-10-05 PROCEDURE — 80048 BASIC METABOLIC PNL TOTAL CA: CPT

## 2020-10-05 PROCEDURE — 94761 N-INVAS EAR/PLS OXIMETRY MLT: CPT

## 2020-10-05 PROCEDURE — 85025 COMPLETE CBC W/AUTO DIFF WBC: CPT

## 2020-10-05 RX ADMIN — INSULIN LISPRO 2 UNITS: 100 INJECTION, SOLUTION INTRAVENOUS; SUBCUTANEOUS at 12:23

## 2020-10-05 RX ADMIN — ACETAMINOPHEN 650 MG: 325 TABLET ORAL at 17:15

## 2020-10-05 RX ADMIN — CYCLOBENZAPRINE 10 MG: 10 TABLET, FILM COATED ORAL at 09:22

## 2020-10-05 RX ADMIN — ACETAMINOPHEN 650 MG: 325 TABLET ORAL at 03:55

## 2020-10-05 RX ADMIN — OXYCODONE AND ACETAMINOPHEN 1 TABLET: 5; 325 TABLET ORAL at 16:02

## 2020-10-05 RX ADMIN — DICLOFENAC 2 G: 10 GEL TOPICAL at 21:02

## 2020-10-05 RX ADMIN — ACETAMINOPHEN 650 MG: 325 TABLET ORAL at 23:27

## 2020-10-05 RX ADMIN — ACETAMINOPHEN 650 MG: 325 TABLET ORAL at 12:25

## 2020-10-05 RX ADMIN — DOCUSATE SODIUM 50 MG AND SENNOSIDES 8.6 MG 1 TABLET: 8.6; 5 TABLET, FILM COATED ORAL at 21:02

## 2020-10-05 RX ADMIN — CYCLOBENZAPRINE 10 MG: 10 TABLET, FILM COATED ORAL at 21:02

## 2020-10-05 RX ADMIN — CYCLOBENZAPRINE 10 MG: 10 TABLET, FILM COATED ORAL at 14:16

## 2020-10-05 RX ADMIN — DOCUSATE SODIUM 50 MG AND SENNOSIDES 8.6 MG 1 TABLET: 8.6; 5 TABLET, FILM COATED ORAL at 09:22

## 2020-10-05 RX ADMIN — OXYCODONE AND ACETAMINOPHEN 1 TABLET: 5; 325 TABLET ORAL at 09:22

## 2020-10-05 RX ADMIN — OXYCODONE AND ACETAMINOPHEN 1 TABLET: 5; 325 TABLET ORAL at 22:09

## 2020-10-05 RX ADMIN — FAMOTIDINE 20 MG: 20 TABLET ORAL at 21:02

## 2020-10-05 RX ADMIN — LOSARTAN POTASSIUM 50 MG: 50 TABLET, FILM COATED ORAL at 09:22

## 2020-10-05 RX ADMIN — INSULIN GLARGINE 20 UNITS: 100 INJECTION, SOLUTION SUBCUTANEOUS at 21:02

## 2020-10-05 RX ADMIN — LOSARTAN POTASSIUM 50 MG: 50 TABLET, FILM COATED ORAL at 21:02

## 2020-10-05 RX ADMIN — GLIPIZIDE 10 MG: 10 TABLET, FILM COATED, EXTENDED RELEASE ORAL at 09:22

## 2020-10-05 RX ADMIN — FAMOTIDINE 20 MG: 20 TABLET ORAL at 09:22

## 2020-10-05 RX ADMIN — INSULIN LISPRO 2 UNITS: 100 INJECTION, SOLUTION INTRAVENOUS; SUBCUTANEOUS at 17:15

## 2020-10-05 RX ADMIN — ENOXAPARIN SODIUM 40 MG: 40 INJECTION SUBCUTANEOUS at 09:22

## 2020-10-05 ASSESSMENT — PAIN DESCRIPTION - LOCATION
LOCATION: HIP;LEG
LOCATION: HIP;LEG
LOCATION: HIP
LOCATION: HIP

## 2020-10-05 ASSESSMENT — PAIN SCALES - GENERAL
PAINLEVEL_OUTOF10: 5
PAINLEVEL_OUTOF10: 3
PAINLEVEL_OUTOF10: 6
PAINLEVEL_OUTOF10: 5
PAINLEVEL_OUTOF10: 4
PAINLEVEL_OUTOF10: 4
PAINLEVEL_OUTOF10: 3
PAINLEVEL_OUTOF10: 2
PAINLEVEL_OUTOF10: 3

## 2020-10-05 ASSESSMENT — PAIN DESCRIPTION - ORIENTATION
ORIENTATION: RIGHT

## 2020-10-05 ASSESSMENT — PAIN DESCRIPTION - PAIN TYPE
TYPE: SURGICAL PAIN

## 2020-10-05 NOTE — PROGRESS NOTES
Occupational Therapy    Occupational Therapy Treatment Note  Name: Surinder Villaseñor MRN: [id number] :   1955   Date:  10/5/2020   Admission Date: 2020 Room:  [address] [place] [address]   Restrictions/Precautions:  Restrictions/Precautions  Restrictions/Precautions: Weight Bearing, Fall Risk(spinal precautions)  Required Braces or Orthoses?: No   Communication with other providers: OK to see per nurse  Subjective:  Patient states:  I need my percocet  Pain:   Location, Type, Intensity (0/10 to 10/10):  5/10 R hip  Objective:    Observation:  Pt was supine I bed, HOB elevated  Objective Measures: Pt was seen for bed mobility, donning socks with a.e., exercise  Treatment, including education:  In bed, Pt did 10 reps of cane ex 1 lb wt, shoulder flex/ext, abd/add, circles, rowing, elbow curls; Pt was S for supine to sit on EOB. Pt donned socks with Min A initially using sock aid but then with SBA. Pt stood and walked 3 steps to [facility][1] and then sitting. CGA for sit to supine using leglifter. Scooted to [facility][1] S; Pt was left in bed with call light, bed alarm. Daughter and granddaughter came to visit  Assessment / Impression:        Patient's tolerance of treatment:  good  Adverse Reaction: no  Significant change in status and impact:  No  Barriers to improvement:  improving  Plan for Next Session:    adls or transfers  Time in:  1600  Time out:  1630  Timed treatment minutes:  30  Total treatment time: 30  Electronically signed by:    Julian Alford,   10/5/2020, 5:22 PM    Previously filed values:  Patient Goals   Patient goals : To return home  Short term goals  Time Frame for Short term goals: Until discharge  Short term goal 1: Pt will complete all LB ADLs mod I using AE PRN and min cues for increased functional independence  Short term goal 2: Pt will complete all functional transfers with SBA and min cues for increased functional independence.   Short term goal 3: Pt will complete all aspects of toileting mod I for increased functional independence  Short term goal 4: Pt will participate in therex/act with focus on UE strengthening and home management with min cues for increased independence with IADL tasks

## 2020-10-05 NOTE — PROGRESS NOTES
Physical Therapy    Physical Therapy Treatment Note  Name: Gosia Kang MRN: [id number] :   1955   Date:  10/5/2020   Admission Date: 2020 Room:  [address] [place] [address]   Restrictions/Precautions:  Restrictions/Precautions  Restrictions/Precautions: Weight Bearing, Fall Risk(spinal precautions)  Required Braces or Orthoses?: No Lower Extremity Weight Bearing Restrictions  Right Lower Extremity Weight Bearing: Toe Touch Weight Bearing     Communication with other providers:    Subjective:  Patient states: \"Are we going to walk? \"  Pain:   Location, Type, Intensity (0/10 to 10/10):  R hip pain  Objective:    Observation:  P sitting up in bed  Treatment, including education/measures:  Bed mobility: supine <>sit with supervision, leg  and increased time  Transfers: sit <> stand from bed with CGA to RW, sit <> stand from commode with CGA to RW, sit <> stand from chair x 4 reps with focus on TTWB and less weight through R foot. P performs with mod cues/ P attempts to stand with RUE on walker to assist with un-weighting of RLE but unable to stand up fully. P uses BUE support from chair with CGA. Gait: Ambulates with RW 70', 40' with CGA. P with TTWB on RLE able to maintain. P fatigued at end of session resulting in decreased alexi. P with improved RLE orientation compared to previous sessions  There. Ex: Seated RLE heel slides (reduced knee ROM) x 10 reps x 2, seated marching with reduced clearance on R x 10 reps, LAQ x 15 reps JUAN, ankle pumps x 15 reps  Standing step taps with RW to 6\" step RLE only x 5 reps x 3 bouts  P educated on RLE positioning and importance of working on R knee ROM  P left supine in bed with bed alarm on and call light within reach  2nd session  P assisted from commode with CGA. P performs sit to supine with supervision  P left supine in bed with bed alarm on and call light within reach. Assessment / Impression:    P with improved gait mechanics.  Recommend continued skilled PT to address deficits in gait, strength and ROM. Patient's tolerance of treatment:  good   Adverse Reaction: pain  Significant change in status and impact:  Improved gait  Barriers to improvement:  Pain, weakness  Plan for Next Session:    Gait, strength  Time in:  1136/1212. Time out:  1540/1545  Timed treatment minutes:  36/41 total  Total treatment time:  36/ 41 total    Previously filed items:  Social/Functional History  Lives With: Alone  Type of Home: Apartment  Home Layout: One level  Home Access: Level entry  Bathroom Shower/Tub: Tub/Shower unit  Bathroom Toilet: Standard  Bathroom Equipment: Grab bars around toilet  Bathroom Accessibility: Accessible  Receives Help From: Family  ADL Assistance: Independent  Homemaking Assistance: Independent  Homemaking Responsibilities: Yes  Ambulation Assistance: Independent  Transfer Assistance: Independent  Active : Yes  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   Short term goal 4: P will ambulate x 150' with RW and Celso while maintaining TTWB on RLE       Electronically signed by:    Kellie Rivas PT  10/5/2020, 1:36 PM

## 2020-10-05 NOTE — PLAN OF CARE
Problem: Falls - Risk of:  Goal: Will remain free from falls  Description: Will remain free from falls  10/4/2020 2303 by Kia Park RN  Outcome: Ongoing  10/4/2020 1318 by Pacheco Pool RN  Outcome: Ongoing  Goal: Absence of physical injury  Description: Absence of physical injury  10/4/2020 2303 by Kia Park RN  Outcome: Ongoing  10/4/2020 1318 by Pacheco Pool RN  Outcome: Ongoing     Problem: SAFETY  Goal: LTG - Patient will demonstrate safety requirements appropriate to situation/environment  10/4/2020 2303 by Kia Park RN  Outcome: Ongoing  10/4/2020 1318 by Pacheco Pool RN  Outcome: Ongoing  Goal: LTG - patient will utilize safety techniques  10/4/2020 2303 by Kia Park RN  Outcome: Ongoing  10/4/2020 1318 by Pacheco Pool RN  Outcome: Ongoing  Goal: STG - patient locks brakes on wheelchair  10/4/2020 2303 by Kia Park RN  Outcome: Ongoing  10/4/2020 1318 by Pacheco Pool RN  Outcome: Ongoing  Goal: STG - Patient uses call light consistently to request assistance with transfers  10/4/2020 2303 by Kia Park RN  Outcome: Ongoing  10/4/2020 1318 by Pacheco Pool RN  Outcome: Ongoing  Goal: STG - patient uses gait belt during all transfers  10/4/2020 2303 by Kia Park RN  Outcome: Ongoing  10/4/2020 1318 by Pacheco Pool RN  Outcome: Ongoing     Problem: Pain:  Goal: Pain level will decrease  Description: Pain level will decrease  10/4/2020 2303 by Kia Park RN  Outcome: Ongoing  10/4/2020 1318 by Pacheco Pool RN  Outcome: Ongoing  Goal: Control of acute pain  Description: Control of acute pain  10/4/2020 2303 by Kia Park RN  Outcome: Ongoing  10/4/2020 1318 by Pacheco Pool RN  Outcome: Ongoing  Goal: Control of chronic pain  Description: Control of chronic pain  10/4/2020 2303 by Kia Park RN  Outcome: Ongoing  10/4/2020 1318 by Pacheco Pool RN  Outcome: Ongoing

## 2020-10-06 LAB
GLUCOSE BLD-MCNC: 127 MG/DL (ref 70–99)
GLUCOSE BLD-MCNC: 135 MG/DL (ref 70–99)
GLUCOSE BLD-MCNC: 147 MG/DL (ref 70–99)
GLUCOSE BLD-MCNC: 93 MG/DL (ref 70–99)

## 2020-10-06 PROCEDURE — 6370000000 HC RX 637 (ALT 250 FOR IP): Performed by: INTERNAL MEDICINE

## 2020-10-06 PROCEDURE — 97110 THERAPEUTIC EXERCISES: CPT

## 2020-10-06 PROCEDURE — 97535 SELF CARE MNGMENT TRAINING: CPT

## 2020-10-06 PROCEDURE — 82962 GLUCOSE BLOOD TEST: CPT

## 2020-10-06 PROCEDURE — 97116 GAIT TRAINING THERAPY: CPT

## 2020-10-06 PROCEDURE — 97530 THERAPEUTIC ACTIVITIES: CPT

## 2020-10-06 PROCEDURE — 6360000002 HC RX W HCPCS: Performed by: INTERNAL MEDICINE

## 2020-10-06 PROCEDURE — 1200000002 HC SEMI PRIVATE SWING BED

## 2020-10-06 PROCEDURE — 97112 NEUROMUSCULAR REEDUCATION: CPT

## 2020-10-06 PROCEDURE — 94761 N-INVAS EAR/PLS OXIMETRY MLT: CPT

## 2020-10-06 RX ADMIN — GLIPIZIDE 10 MG: 10 TABLET, FILM COATED, EXTENDED RELEASE ORAL at 09:20

## 2020-10-06 RX ADMIN — INSULIN GLARGINE 20 UNITS: 100 INJECTION, SOLUTION SUBCUTANEOUS at 19:58

## 2020-10-06 RX ADMIN — OXYCODONE AND ACETAMINOPHEN 1 TABLET: 5; 325 TABLET ORAL at 10:38

## 2020-10-06 RX ADMIN — CYCLOBENZAPRINE 10 MG: 10 TABLET, FILM COATED ORAL at 14:17

## 2020-10-06 RX ADMIN — ENOXAPARIN SODIUM 40 MG: 40 INJECTION SUBCUTANEOUS at 09:20

## 2020-10-06 RX ADMIN — INSULIN LISPRO 2 UNITS: 100 INJECTION, SOLUTION INTRAVENOUS; SUBCUTANEOUS at 17:17

## 2020-10-06 RX ADMIN — FAMOTIDINE 20 MG: 20 TABLET ORAL at 09:20

## 2020-10-06 RX ADMIN — LOSARTAN POTASSIUM 50 MG: 50 TABLET, FILM COATED ORAL at 19:58

## 2020-10-06 RX ADMIN — LOSARTAN POTASSIUM 50 MG: 50 TABLET, FILM COATED ORAL at 09:20

## 2020-10-06 RX ADMIN — CYCLOBENZAPRINE 10 MG: 10 TABLET, FILM COATED ORAL at 19:57

## 2020-10-06 RX ADMIN — OXYCODONE AND ACETAMINOPHEN 1 TABLET: 5; 325 TABLET ORAL at 23:28

## 2020-10-06 RX ADMIN — ACETAMINOPHEN 650 MG: 325 TABLET ORAL at 14:17

## 2020-10-06 RX ADMIN — DICLOFENAC 2 G: 10 GEL TOPICAL at 19:58

## 2020-10-06 RX ADMIN — DOCUSATE SODIUM 50 MG AND SENNOSIDES 8.6 MG 1 TABLET: 8.6; 5 TABLET, FILM COATED ORAL at 09:20

## 2020-10-06 RX ADMIN — DOCUSATE SODIUM 50 MG AND SENNOSIDES 8.6 MG 1 TABLET: 8.6; 5 TABLET, FILM COATED ORAL at 19:57

## 2020-10-06 RX ADMIN — FAMOTIDINE 20 MG: 20 TABLET ORAL at 19:57

## 2020-10-06 RX ADMIN — ACETAMINOPHEN 650 MG: 325 TABLET ORAL at 06:14

## 2020-10-06 RX ADMIN — OXYCODONE AND ACETAMINOPHEN 1 TABLET: 5; 325 TABLET ORAL at 04:29

## 2020-10-06 RX ADMIN — DICLOFENAC 2 G: 10 GEL TOPICAL at 09:20

## 2020-10-06 RX ADMIN — ACETAMINOPHEN 650 MG: 325 TABLET ORAL at 19:57

## 2020-10-06 RX ADMIN — CYCLOBENZAPRINE 10 MG: 10 TABLET, FILM COATED ORAL at 09:20

## 2020-10-06 RX ADMIN — OXYCODONE AND ACETAMINOPHEN 1 TABLET: 5; 325 TABLET ORAL at 17:14

## 2020-10-06 ASSESSMENT — PAIN DESCRIPTION - ONSET
ONSET: ON-GOING
ONSET: GRADUAL

## 2020-10-06 ASSESSMENT — PAIN SCALES - GENERAL
PAINLEVEL_OUTOF10: 3
PAINLEVEL_OUTOF10: 3
PAINLEVEL_OUTOF10: 0
PAINLEVEL_OUTOF10: 6
PAINLEVEL_OUTOF10: 4
PAINLEVEL_OUTOF10: 3
PAINLEVEL_OUTOF10: 5
PAINLEVEL_OUTOF10: 5
PAINLEVEL_OUTOF10: 4
PAINLEVEL_OUTOF10: 3
PAINLEVEL_OUTOF10: 3

## 2020-10-06 ASSESSMENT — PAIN DESCRIPTION - PROGRESSION
CLINICAL_PROGRESSION: GRADUALLY WORSENING
CLINICAL_PROGRESSION: GRADUALLY IMPROVING
CLINICAL_PROGRESSION: GRADUALLY IMPROVING
CLINICAL_PROGRESSION: GRADUALLY WORSENING
CLINICAL_PROGRESSION: GRADUALLY WORSENING
CLINICAL_PROGRESSION: NOT CHANGED
CLINICAL_PROGRESSION: GRADUALLY WORSENING

## 2020-10-06 ASSESSMENT — PAIN DESCRIPTION - PAIN TYPE
TYPE: SURGICAL PAIN
TYPE: CHRONIC PAIN
TYPE: SURGICAL PAIN

## 2020-10-06 ASSESSMENT — PAIN DESCRIPTION - DESCRIPTORS
DESCRIPTORS: ACHING

## 2020-10-06 ASSESSMENT — PAIN DESCRIPTION - FREQUENCY
FREQUENCY: CONTINUOUS

## 2020-10-06 ASSESSMENT — PAIN DESCRIPTION - ORIENTATION
ORIENTATION: LOWER
ORIENTATION: RIGHT
ORIENTATION: LOWER
ORIENTATION: RIGHT
ORIENTATION: LOWER
ORIENTATION: RIGHT;LOWER

## 2020-10-06 ASSESSMENT — PAIN DESCRIPTION - LOCATION
LOCATION: BACK;HIP
LOCATION: BACK
LOCATION: BACK
LOCATION: HIP;LEG
LOCATION: HIP;LEG
LOCATION: BACK

## 2020-10-06 ASSESSMENT — PAIN - FUNCTIONAL ASSESSMENT
PAIN_FUNCTIONAL_ASSESSMENT: ACTIVITIES ARE NOT PREVENTED

## 2020-10-06 NOTE — FLOWSHEET NOTE
Physical Therapy Treatment Note  Name: Christiano Rizvi MRN: [id number] :   1955   Date:  10/6/2020   Admission Date: 2020 Room:  [address] [place] [address]   Restrictions/Precautions:  Restrictions/Precautions  Restrictions/Precautions: Weight Bearing, Fall Risk(spinal precautions)  Required Braces or Orthoses?: No Lower Extremity Weight Bearing Restrictions  Right Lower Extremity Weight Bearing: Toe Touch Weight Bearing       Communication with other providers:  Nayan Goodwin Dr to see per nursing    Subjective:  Patient states:  I knew you were coming    Pain:   Pt c/o pain 3/10 prior to and after tx on R LE    Objective:    Observation:  Pt awake and alert in semi fowlers position    Treatment, including education/measures:  Pt performed supine <>EOB @ CGA, sit<>stands from EOB @ CGA/Nancy to stand, lateral steps to R with vc's for proper TTWB, pt performed BLE ex in supine, amb to <> from bathroom @ CGA with vc's for proper TTWB, pt stood @ CGA/SBA to brush teeth. Pt left on BSC with call light in reach.      Assessment / Impression:       Patient's tolerance of treatment: good   Adverse Reaction: none  Significant change in status and impact:  none  Barriers to improvement:  Pain and weakness    Plan for Next Session:    Cont with PT POC    Time in:    Time out:   Timed treatment minutes:  45  Total treatment time:  39    Previously filed items:  Social/Functional History  Lives With: Alone  Type of Home: Apartment  Home Layout: One level  Home Access: Level entry  Bathroom Shower/Tub: Tub/Shower unit  Bathroom Toilet: Standard  Bathroom Equipment: Grab bars around toilet  Bathroom Accessibility: Accessible  Receives Help From: Family  ADL Assistance: Independent  Homemaking Assistance: Independent  Homemaking Responsibilities: Yes  Ambulation Assistance: Independent  Transfer Assistance: Independent  Active : Yes  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 Short term goal 4: P will ambulate x 150' with RW and Celso while maintaining TTWB on RLE       Electronically signed by:     Lexie Cancino PTA  10/6/2020, 7:34 PM

## 2020-10-06 NOTE — CARE COORDINATION
CM met with the patient for follow-up discussion regarding discharge planning. Patient notified that her insurance has requested that an update be submitted 10/8, patient voiced understanding. Patient stated that she will not be ready to return home in two days, she feels that she is too unstable/unsafe and does not have someone that can be with her 24 hours/day. Patient stated that she will need a two-wheeled walker and a wide bedside commode. Patient unable to identify any other needs at this time. CM will follow.

## 2020-10-06 NOTE — PROGRESS NOTES
Occupational Therapy    Occupational Therapy Treatment Note  Name: Chris Loomis MRN: [id number] :   1955   Date:  10/6/2020   Admission Date: 2020 Room:  013/013-01   Restrictions/Precautions:  Restrictions/Precautions  Restrictions/Precautions: Weight Bearing, Fall Risk(spinal precautions)  Required Braces or Orthoses?: No     Communication with other providers:   Cleared for treatment by  RN. Subjective:  Patient states: \"I want to go to the shower\"  Pain:   Location, Type, Intensity (0/10 to 10/10):  4/10 R LE    Objective:    Observation:  Pt alert and oriented. Treatment, including education:  Transfers  Supine to sit :Sup  Sit to supine :Sup  Scooting :Sup  Rolling :Sup  Sit to stand :CGA  Stand to sit CGA  SPT:CGA  Shower:CGA      Self Care Training:   Cues were given for safety, sequence, UE/LE placement, visual cues, and balance. Activities performed today included dressing, toileting, hand hygiene, and grooming. Daughter present and observed pt transfers in/out of shower and discussed home equipment needs for pt's shower. Grooming  Mod I    Bathing   Min A for B feet. CGA while standing in shower to maintain balance using handrails    UB Dress  Sup for pull over dress. LB Dress  Dep for socks. Therapeutic Activity Training:   Therapeutic activity training was instructed today. Cues were given for safety, sequence, UE/LE placement, awareness, and balance. Activities performed today included bed mobility training, sup-sit, sit-stand, SPT. Safety Measures: Gait belt used, Left in bed, Pull/Bed Alarm activated and call light left in reach          Assessment / Impression:        Patient's tolerance of treatment: Good   Adverse Reaction: None  Significant change in status and impact:  None  Barriers to improvement:  None    Plan for Next Session:    Continue with POC.     Time in:  0920  Time out:  1013  Timed treatment minutes:  53  Total treatment time: 48  Electronically signed by:    Emani Cleveland, [address]    10/6/2020, 1:05 PM    Previously filed values:  Patient Goals   Patient goals : To return home  Short term goals  Time Frame for Short term goals: Until discharge  Short term goal 1: Pt will complete all LB ADLs mod I using AE PRN and min cues for increased functional independence  Short term goal 2: Pt will complete all functional transfers with SBA and min cues for increased functional independence.   Short term goal 3: Pt will complete all aspects of toileting mod I for increased functional independence  Short term goal 4: Pt will participate in therex/act with focus on UE strengthening and home management with min cues for increased independence with IADL tasks

## 2020-10-07 LAB
ANION GAP SERPL CALCULATED.3IONS-SCNC: 12 MMOL/L (ref 4–16)
BASOPHILS ABSOLUTE: 0 K/CU MM
BASOPHILS RELATIVE PERCENT: 0.5 % (ref 0–1)
BUN BLDV-MCNC: 10 MG/DL (ref 6–23)
CALCIUM SERPL-MCNC: 9.1 MG/DL (ref 8.3–10.6)
CHLORIDE BLD-SCNC: 104 MMOL/L (ref 99–110)
CO2: 26 MMOL/L (ref 21–32)
CREAT SERPL-MCNC: 0.7 MG/DL (ref 0.6–1.1)
DIFFERENTIAL TYPE: ABNORMAL
EOSINOPHILS ABSOLUTE: 0.3 K/CU MM
EOSINOPHILS RELATIVE PERCENT: 3.1 % (ref 0–3)
GFR AFRICAN AMERICAN: >60 ML/MIN/1.73M2
GFR NON-AFRICAN AMERICAN: >60 ML/MIN/1.73M2
GLUCOSE BLD-MCNC: 125 MG/DL (ref 70–99)
GLUCOSE BLD-MCNC: 155 MG/DL (ref 70–99)
GLUCOSE BLD-MCNC: 155 MG/DL (ref 70–99)
GLUCOSE BLD-MCNC: 94 MG/DL (ref 70–99)
GLUCOSE BLD-MCNC: 95 MG/DL (ref 70–99)
HCT VFR BLD CALC: 33.5 % (ref 37–47)
HEMOGLOBIN: 10 GM/DL (ref 12.5–16)
IMMATURE NEUTROPHIL %: 0.4 % (ref 0–0.43)
LYMPHOCYTES ABSOLUTE: 2.1 K/CU MM
LYMPHOCYTES RELATIVE PERCENT: 25.8 % (ref 24–44)
MCH RBC QN AUTO: 28.9 PG (ref 27–31)
MCHC RBC AUTO-ENTMCNC: 29.9 % (ref 32–36)
MCV RBC AUTO: 96.8 FL (ref 78–100)
MONOCYTES ABSOLUTE: 0.6 K/CU MM
MONOCYTES RELATIVE PERCENT: 6.9 % (ref 0–4)
PDW BLD-RTO: 15.4 % (ref 11.7–14.9)
PLATELET # BLD: 643 K/CU MM (ref 140–440)
PMV BLD AUTO: 9 FL (ref 7.5–11.1)
POTASSIUM SERPL-SCNC: 4.3 MMOL/L (ref 3.5–5.1)
RBC # BLD: 3.46 M/CU MM (ref 4.2–5.4)
SEGMENTED NEUTROPHILS ABSOLUTE COUNT: 5 K/CU MM
SEGMENTED NEUTROPHILS RELATIVE PERCENT: 63.3 % (ref 36–66)
SODIUM BLD-SCNC: 142 MMOL/L (ref 135–145)
TOTAL IMMATURE NEUTOROPHIL: 0.03 K/CU MM
WBC # BLD: 8 K/CU MM (ref 4–10.5)

## 2020-10-07 PROCEDURE — 6370000000 HC RX 637 (ALT 250 FOR IP): Performed by: INTERNAL MEDICINE

## 2020-10-07 PROCEDURE — 97535 SELF CARE MNGMENT TRAINING: CPT

## 2020-10-07 PROCEDURE — 1200000002 HC SEMI PRIVATE SWING BED

## 2020-10-07 PROCEDURE — 97110 THERAPEUTIC EXERCISES: CPT

## 2020-10-07 PROCEDURE — 85025 COMPLETE CBC W/AUTO DIFF WBC: CPT

## 2020-10-07 PROCEDURE — 36415 COLL VENOUS BLD VENIPUNCTURE: CPT

## 2020-10-07 PROCEDURE — 80048 BASIC METABOLIC PNL TOTAL CA: CPT

## 2020-10-07 PROCEDURE — 6360000002 HC RX W HCPCS: Performed by: INTERNAL MEDICINE

## 2020-10-07 PROCEDURE — 82962 GLUCOSE BLOOD TEST: CPT

## 2020-10-07 PROCEDURE — 97116 GAIT TRAINING THERAPY: CPT

## 2020-10-07 PROCEDURE — 94761 N-INVAS EAR/PLS OXIMETRY MLT: CPT

## 2020-10-07 PROCEDURE — 97530 THERAPEUTIC ACTIVITIES: CPT

## 2020-10-07 RX ADMIN — INSULIN LISPRO 2 UNITS: 100 INJECTION, SOLUTION INTRAVENOUS; SUBCUTANEOUS at 12:43

## 2020-10-07 RX ADMIN — OXYCODONE AND ACETAMINOPHEN 1 TABLET: 5; 325 TABLET ORAL at 16:28

## 2020-10-07 RX ADMIN — CYCLOBENZAPRINE 10 MG: 10 TABLET, FILM COATED ORAL at 16:28

## 2020-10-07 RX ADMIN — CYCLOBENZAPRINE 10 MG: 10 TABLET, FILM COATED ORAL at 08:53

## 2020-10-07 RX ADMIN — LOSARTAN POTASSIUM 50 MG: 50 TABLET, FILM COATED ORAL at 08:54

## 2020-10-07 RX ADMIN — INSULIN LISPRO 1 UNITS: 100 INJECTION, SOLUTION INTRAVENOUS; SUBCUTANEOUS at 20:32

## 2020-10-07 RX ADMIN — FAMOTIDINE 20 MG: 20 TABLET ORAL at 20:32

## 2020-10-07 RX ADMIN — CYCLOBENZAPRINE 10 MG: 10 TABLET, FILM COATED ORAL at 20:32

## 2020-10-07 RX ADMIN — DICLOFENAC 2 G: 10 GEL TOPICAL at 20:32

## 2020-10-07 RX ADMIN — ACETAMINOPHEN 650 MG: 325 TABLET ORAL at 08:57

## 2020-10-07 RX ADMIN — ENOXAPARIN SODIUM 40 MG: 40 INJECTION SUBCUTANEOUS at 08:54

## 2020-10-07 RX ADMIN — ACETAMINOPHEN 650 MG: 325 TABLET ORAL at 16:28

## 2020-10-07 RX ADMIN — FAMOTIDINE 20 MG: 20 TABLET ORAL at 08:52

## 2020-10-07 RX ADMIN — ACETAMINOPHEN 650 MG: 325 TABLET ORAL at 00:17

## 2020-10-07 RX ADMIN — OXYCODONE AND ACETAMINOPHEN 1 TABLET: 5; 325 TABLET ORAL at 08:53

## 2020-10-07 RX ADMIN — OXYCODONE AND ACETAMINOPHEN 1 TABLET: 5; 325 TABLET ORAL at 22:33

## 2020-10-07 RX ADMIN — GLIPIZIDE 10 MG: 10 TABLET, FILM COATED, EXTENDED RELEASE ORAL at 08:53

## 2020-10-07 RX ADMIN — DOCUSATE SODIUM 50 MG AND SENNOSIDES 8.6 MG 1 TABLET: 8.6; 5 TABLET, FILM COATED ORAL at 20:32

## 2020-10-07 RX ADMIN — DICLOFENAC 2 G: 10 GEL TOPICAL at 08:55

## 2020-10-07 RX ADMIN — LOSARTAN POTASSIUM 50 MG: 50 TABLET, FILM COATED ORAL at 20:32

## 2020-10-07 RX ADMIN — INSULIN GLARGINE 20 UNITS: 100 INJECTION, SOLUTION SUBCUTANEOUS at 20:32

## 2020-10-07 ASSESSMENT — PAIN SCALES - GENERAL
PAINLEVEL_OUTOF10: 2
PAINLEVEL_OUTOF10: 4
PAINLEVEL_OUTOF10: 4
PAINLEVEL_OUTOF10: 0
PAINLEVEL_OUTOF10: 5
PAINLEVEL_OUTOF10: 3
PAINLEVEL_OUTOF10: 4
PAINLEVEL_OUTOF10: 0
PAINLEVEL_OUTOF10: 4

## 2020-10-07 ASSESSMENT — PAIN DESCRIPTION - ONSET
ONSET: GRADUAL
ONSET: GRADUAL

## 2020-10-07 ASSESSMENT — PAIN DESCRIPTION - FREQUENCY
FREQUENCY: CONTINUOUS
FREQUENCY: CONTINUOUS

## 2020-10-07 ASSESSMENT — PAIN DESCRIPTION - DESCRIPTORS
DESCRIPTORS: ACHING
DESCRIPTORS: ACHING

## 2020-10-07 ASSESSMENT — PAIN DESCRIPTION - PROGRESSION
CLINICAL_PROGRESSION: GRADUALLY WORSENING

## 2020-10-07 ASSESSMENT — PAIN - FUNCTIONAL ASSESSMENT
PAIN_FUNCTIONAL_ASSESSMENT: ACTIVITIES ARE NOT PREVENTED
PAIN_FUNCTIONAL_ASSESSMENT: ACTIVITIES ARE NOT PREVENTED

## 2020-10-07 ASSESSMENT — PAIN DESCRIPTION - ORIENTATION
ORIENTATION: LOWER
ORIENTATION: LOWER

## 2020-10-07 ASSESSMENT — PAIN DESCRIPTION - LOCATION
LOCATION: BACK
LOCATION: BACK

## 2020-10-07 ASSESSMENT — PAIN DESCRIPTION - PAIN TYPE
TYPE: CHRONIC PAIN
TYPE: CHRONIC PAIN

## 2020-10-07 NOTE — PROGRESS NOTES
Occupational Therapy    Occupational Therapy Treatment Note  Name: Vernon Wagoner MRN: [id number] :   1955   Date:  10/7/2020   Admission Date: 2020 Room:  [facility]-01   Restrictions/Precautions:  Restrictions/Precautions  Restrictions/Precautions: Weight Bearing, Fall Risk(spinal precautions)  Required Braces or Orthoses?: No     Communication with other providers:   Cleared for treatment by  RN. Subjective:  Patient states: \"I am feeling better \"  Pain:   Location, Type, Intensity (0/10 to 10/10):  2/10 R LE and back    Objective:    Observation:  Pt alert and oriented, supine in bed upon arrival    Treatment, including education:  Transfers:  Supine to sit: mod I  Sit to stand from EOB: SBA  Stand to sit EOB: SBA  Sit to supine: CGA for R LE to guide into bed  Pt performs side steps to scoot up at EOB with SBA  Therapeutic Exercise:  Cues were given for technique, safety, recruitment, and rationale. Cues were verbal and/or tactile. 15-20 reps of cane exercises sitting EOB with 2lb cane while seated EOB for increased core engagement   shoulder flex/ext, abd/add, circles, rowing, elbow curls  Self Care Training:   Cues were given for safety, sequence, UE/LE placement, visual cues, and balance. Activities performed today included dressing  Pt given visual demonstration and verbal cues for donning sweat pants using reacher. Following, Pt is able to lace BLE through pants with supervision and min cues. Pt mod I to don L sock using sock aid. Pt dependent for R sock due to increased swelling.      Safety Measures: Gait belt used, Left in bed, Pull/Bed Alarm activated and call light left in reach  Assessment / Impression:        Patient's tolerance of treatment: Good   Adverse Reaction: None  Significant change in status and impact:  Increased [place] with LB dressing  Barriers to improvement:  None    Plan for Next Session:    LB dressing, strengthening, ADLS  Time in:  8062  Time out:1228 Timed treatment minutes:  33  Total treatment time:  33  Electronically signed by:    DAILY Curtis    10/7/2020, 1:27 PM    Previously filed values:  Patient Goals   Patient goals : To return home  Short term goals  Time Frame for Short term goals: Until discharge  Short term goal 1: Pt will complete all LB ADLs mod I using AE PRN and min cues for increased functional independence  Short term goal 2: Pt will complete all functional transfers with SBA and min cues for increased functional independence.   Short term goal 3: Pt will complete all aspects of toileting mod I for increased functional independence  Short term goal 4: Pt will participate in therex/act with focus on UE strengthening and home management with min cues for increased independence with IADL tasks

## 2020-10-07 NOTE — PLAN OF CARE
Problem: Pain:  Description: Pain management should include both nonpharmacologic and pharmacologic interventions.   Goal: Pain level will decrease  Description: Pain level will decrease  Outcome: Ongoing  Goal: Control of acute pain  Description: Control of acute pain  Outcome: Ongoing  Goal: Control of chronic pain  Description: Control of chronic pain  Outcome: Ongoing     Problem: SAFETY  Goal: LTG - Patient will demonstrate safety requirements appropriate to situation/environment  Outcome: Ongoing  Goal: LTG - patient will utilize safety techniques  Outcome: Ongoing  Goal: STG - patient locks brakes on wheelchair  Outcome: Ongoing  Goal: STG - Patient uses call light consistently to request assistance with transfers  Outcome: Ongoing  Goal: STG - patient uses gait belt during all transfers  Outcome: Ongoing     Problem: Falls - Risk of:  Goal: Will remain free from falls  Description: Will remain free from falls  Outcome: Ongoing  Goal: Absence of physical injury  Description: Absence of physical injury  Outcome: Ongoing

## 2020-10-07 NOTE — FLOWSHEET NOTE
Physical Therapy Treatment Note  Name: Felicita Duran MRN: [id number] :   1955   Date:  10/7/2020   Admission Date: 2020 Room:  [address] [place] [address]   Restrictions/Precautions:  Restrictions/Precautions  Restrictions/Precautions: Weight Bearing, Fall Risk(spinal precautions)  Required Braces or Orthoses?: No Lower Extremity Weight Bearing Restrictions  Right Lower Extremity Weight Bearing: Toe Touch Weight Bearing       Communication with other providers:  Bevelyn Coats to see per nursing    Subjective:  Patient states:  I wasn't expecting you    Pain:   Location, Type, Intensity (0/10 to 1010):  3/10 on R LE    Objective:    Observation:  Pt awake and alert, agreeable to therapy in semi fowlers position    Treatment, including education/measures:  Pt performed supine <> EOB @ SBA with pt using leg , sit<>stand from EOB @ CGA/Nancy, amb to <> from bathroom @ CGA with RW and pt using TTWB on R LE, pt stood at sink to brush her teeth, while NWB on R LE, pt performed R knee flex with some overpressure to pain tolerance in supine.   Assessment / Impression:       Patient's tolerance of treatment:  good   Adverse Reaction: none  Significant change in status and impact:  none  Barriers to improvement:  Pain     Plan for Next Session:    Cont with PT POC    Time in:    Time out:    Timed treatment minutes:  30  Total treatment time:  30    Previously filed items:  Social/Functional History  Lives With: Alone  Type of Home: Apartment  Home Layout: One level  Home Access: Level entry  Bathroom Shower/Tub: Tub/Shower unit  Bathroom Toilet: Standard  Bathroom Equipment: Grab bars around toilet  Bathroom Accessibility: Accessible  Receives Help From: Family  ADL Assistance: Independent  Homemaking Assistance: Independent  Homemaking Responsibilities: Yes  Ambulation Assistance: Independent  Transfer Assistance: Independent  Active : Yes  Occupation: Retired  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

## 2020-10-07 NOTE — FLOWSHEET NOTE
Physical Therapy Treatment Note  Name: Tarun Pang MRN: [id number] :   1955   Date:  10/7/2020   Admission Date: 2020 Room:  [address] [place] [address]   Restrictions/Precautions:  Restrictions/Precautions  Restrictions/Precautions: Weight Bearing, Fall Risk(spinal precautions)  Required Braces or Orthoses?: No Lower Extremity Weight Bearing Restrictions  Right Lower Extremity Weight Bearing: Toe Touch Weight Bearing       Communication with other providers:  Govind Bermudez to see per nursing    Subjective:  Having some swelling in the right foot. Has been doing some AP to help. Pain:   Pt c/o pain 2/10 prior to and after tx on R hip and back    Objective:    Observation:  Pt awake and alert in semi fowlers position    Treatment, including education/measures:  Supine Therex: heel slides, QS, hip abduction, hip flexion, SAQ 10x ea   Seated therex: LAQ 10x    Patient performed supine to sit and back with SBA with use of  for right LE. Sit to stand and back with CGA from EOB and bedside commode.       Patient ambulated 70' with CGA TTWB with standard walker    Assessment / Impression:       Patient's tolerance of treatment: good   Adverse Reaction: none  Significant change in status and impact:  none  Barriers to improvement:  Pain and weakness    Plan for Next Session:    Cont with PT POC    Time in:  1055  Time out: 1135  Timed treatment minutes:  40  Total treatment time:  36    Previously filed items:  Social/Functional History  Lives With: Alone  Type of Home: Apartment  Home Layout: One level  Home Access: Level entry  Bathroom Shower/Tub: Tub/Shower unit  Bathroom Toilet: Standard  Bathroom Equipment: Grab bars around toilet  Bathroom Accessibility: Accessible  Receives Help From: Family  ADL Assistance: Independent  Homemaking Assistance: Independent  Homemaking Responsibilities: Yes  Ambulation Assistance: Independent  Transfer Assistance: Independent  Active : Yes  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   Short term goal 4: P will ambulate x 150' with RW and Celso while maintaining TTWB on RLE       Electronically signed by:    Bhavik Luo PTA   10/7/2020, 11:33 AM

## 2020-10-07 NOTE — PROGRESS NOTES
Hospitalist Progress Note       Clint Guerrero M.D.  10/7/2020 12:26 PM  Admit Date: 9/29/2020    PCP: Caitlin Burger,      Assessment and Plan:   1. Status post ORIF right femur: Postop day 14. Patient working with physical therapy and Occupational Therapy doing well. Continue pain control with physical therapy as needed. Patient was on Eliquis for DVT prophylaxis. Currently enoxaparin. Patient ambulating. Patient to follow-up with orthopedic surgery in 3 weeks.   10/7: continues to work with PT, [employer] set, and next review date will reassess. 2.  Hypertension:  10/7: fluctuates, was 121 this am tends to rise in the afternoon,     3.  Insulin-dependent diabetes: Hemoglobin A1c on recent admission 8.2.   10/7: Glipizide Lantus 20 units at bedtime 5 units of lispro with meals and a sliding scale at this time for correction. no hypoglycemic readings     4.  Acute blood loss anemia: Postoperatively. Hemoglobin stable 8.2 prior to discharge. 10/7: monitor as needed; has resolved with hgb of 10     5. Low back pain: Patient had mild compression of T11-T12 on lumbar radiographs. Recommending thoracic radiographs. Have ordered same. Patient on spinal precautions with physical therapy. Will continue same until clarification. We will continue Lidoderm patch this time. 10/7: is improving/stable so will continue with current therapy    Patient Active Problem List:     Closed displaced comminuted fracture of shaft of right femur ([address]     Debility      Subjective:     No chief complaint on file. F/U:  Interval History: denied any issues, no dry mouth, light headedness, difficulty swallowing, chest pain or pressure or sob. Feels she is doing well with therapy    Objective:        Intake/Output Summary (Last 24 hours) at 10/7/2020 1226  Last data filed at 10/7/2020 0859  Gross per 24 hour   Intake 600 ml   Output --   Net 600 ml      Vitals:   Vitals:    10/07/20 0909   BP: (!) 121/56   Pulse: 94   Resp: 16   Temp: 97.8 °F (36.6 °C)   SpO2: 96%     Physical Exam:  Gen:  awake, alert, cooperative, no apparent distress, EYES:  Lids and lashes normal, pupils equal, round and reactive to light, extra ocular muscles intact, sclera clear, conjunctiva normal  ENT:  Normocephalic, oral pharynx with moist mucus membranes, tonsils without erythema or exudates,  NECK:  Supple, symmetrical, trachea midline, no adenopathy,  LUNGS:  Clear to auscultate bilaterally, no rales ronchi or wheezing noted. CARDIOVASCULAR:  regular rate and rhythm, normal S1 and S2, no S3 or S4, and no murmur noted  ABDOMEN: Normal BS, Non tender, non distended, no HSM noted. MUSCULOSKELETAL:  ROM of all extremities grossly wnl  NEUROLOGIC: AOx 3,  Cranial nerves II-XII are grossly intact. Motor is 5 out of 5 bilaterally. Sensory is intact  SKIN:  no bruising or bleeding, normal skin color, texture, turgor and no redness, warmth, or swelling    Xr Thoracolumbar Spine (min 2 Views)    Result Date: 10/1/2020  EXAMINATION: TWO XRAY VIEWS OF THE THORACIC AND LUMBAR SPINE 10/1/2020 11:23 am COMPARISON: None. HISTORY: ORDERING SYSTEM PROVIDED HISTORY: Lumbar radiograph /c suspected compression T11/12, recommending thoracic series. Thank you TECHNOLOGIST PROVIDED HISTORY: Reason for exam:->Lumbar radiograph /c suspected compression T11/12, recommending thoracic series. Thank you Reason for Exam:  suspected compression T11/12, Acuity: Acute Type of Exam: Initial Additional signs and symptoms:  suspected compression T11/12, femur fx with surgery 9- FINDINGS: Mild central compression involves the inferior vertebral end-plate of T45, age unknown but appears old. Postoperative changes were noted from prior cholecystectomy. No retropulsion was noted at T12. Mild central compression involves the inferior vertebral end-plate of K17, age unknown but appears old. No retropulsion was noted.      Xr Lumbar Spine (2-3 Views)    Result Date: 9/24/2020  EXAMINATION: 4 XRAY VIEWS OF THE LUMBAR SPINE 9/24/2020 11:34 am COMPARISON: None. HISTORY: ORDERING SYSTEM PROVIDED HISTORY: back pain s/p fall TECHNOLOGIST PROVIDED HISTORY: Reason for exam:->back pain s/p fall Reason for Exam: Back pain Acuity: Acute Type of Exam: Initial Mechanism of Injury: fall FINDINGS: Lumbar vertebral bodies are normal in height. Mild grade 1 anterolisthesis L4 on L5. No acute lumbar spine fracture. Possible compression deformities T11 and T12. No evidence of fracture. Visualized sacrum is unremarkable. No significant degenerative changes. 1. Mild grade 1 anterolisthesis L4 on L5 2. No acute lumbar spine abnormality 3. Possible mild compression deformities of T11 and T12. Radiographs of the thoracic spine are suggested 4. The findings were sent to the Radiology Results [address] at 12:21 pm on 9/24/2020to be communicated to a licensed caregiver. Xr Femur Right (min 2 Views)    Result Date: 9/22/2020  EXAMINATION: ONE XRAY VIEW OF THE PELVIS AND TWO XRAY VIEWS RIGHT HIP; 3 XRAY VIEWS OF THE RIGHT FEMUR 9/22/2020 12:04 pm COMPARISON: None. HISTORY: ORDERING SYSTEM PROVIDED HISTORY: Fall TECHNOLOGIST PROVIDED HISTORY: Reason for exam:->Fall Reason for Exam: fall Acuity: Acute Type of Exam: Initial Mechanism of Injury: tripped Relevant Medical/Surgical History: distal femur pain and swelling; ORDERING SYSTEM PROVIDED HISTORY: Fall TECHNOLOGIST PROVIDED HISTORY: Reason for exam:->Fall Reason for Exam: fall Acuity: Acute Type of Exam: Initial Mechanism of Injury: tripped Relevant Medical/Surgical History: diatal femur pain and swelling FINDINGS: Oblique laterally displaced fracture distal femur. Otherwise, anatomic alignment. No other fracture identified. Oblique laterally displaced fracture distal femur     Fl Less Than 1 Hour    Result Date: 9/23/2020  Radiology exam is complete. No Radiologist dictation. Please follow up with ordering provider.      Karolina Raymond Chest Portable    Result Date: 9/25/2020  EXAMINATION: ONE XRAY VIEW OF THE CHEST 9/25/2020 11:13 am COMPARISON: None. HISTORY: ORDERING SYSTEM PROVIDED HISTORY: tachy, fever, rule out PNA TECHNOLOGIST PROVIDED HISTORY: Reason for exam:->tachy, fever, rule out PNA Reason for Exam: tachy, fever, rule out PNA FINDINGS: The heart and mediastinal structures are satisfactory. The pulmonary vasculature is normal.  Lungs are clear except for a calcified granuloma in the left midlung. No acute cardiopulmonary disease. Xr Hip 2-3 Vw W Pelvis Right    Result Date: 9/22/2020  EXAMINATION: ONE XRAY VIEW OF THE PELVIS AND TWO XRAY VIEWS RIGHT HIP; 3 XRAY VIEWS OF THE RIGHT FEMUR 9/22/2020 12:04 pm COMPARISON: None. HISTORY: ORDERING SYSTEM PROVIDED HISTORY: Fall TECHNOLOGIST PROVIDED HISTORY: Reason for exam:->Fall Reason for Exam: fall Acuity: Acute Type of Exam: Initial Mechanism of Injury: tripped Relevant Medical/Surgical History: distal femur pain and swelling; ORDERING SYSTEM PROVIDED HISTORY: Fall TECHNOLOGIST PROVIDED HISTORY: Reason for exam:->Fall Reason for Exam: fall Acuity: Acute Type of Exam: Initial Mechanism of Injury: tripped Relevant Medical/Surgical History: diatal femur pain and swelling FINDINGS: Oblique laterally displaced fracture distal femur. Otherwise, anatomic alignment. No other fracture identified. Oblique laterally displaced fracture distal femur   -    DATA:    CBC   Recent Labs     10/05/20  0821 10/07/20  0824   WBC 8.8 8.0   HGB 9.4* 10.0*   HCT 31.4* 33.5*   * 643*      BMP   Recent Labs     10/05/20  0821 10/07/20  0824    142   K 4.3 4.3    104   CO2 25 26   BUN 10 10   CREATININE 0.7 0.7     LFT'S No results for input(s): AST, ALT, ALB, BILIDIR, BILITOT, ALKPHOS in the last 72 hours. COAG No results for input(s): INR in the last 72 hours. CARDIAC ENZYMES  No results for input(s): CKTOTAL, CKMB, CKMBINDEX, TROPONINI in the last 72 hours.   U/A:    Lab Results   Component Value Date    COLORU YELLOW 09/26/2020    WBCUA 2 09/26/2020    RBCUA 2 09/26/2020    MUCUS RARE 09/26/2020    BACTERIA NEGATIVE 09/26/2020    CLARITYU CLEAR 09/26/2020    SPECGRAV 1.023 09/26/2020    LEUKOCYTESUR NEGATIVE 09/26/2020    BLOODU SMALL 09/26/2020         Humberto Rodney MD  Rounding Hospitalist

## 2020-10-08 LAB
GLUCOSE BLD-MCNC: 104 MG/DL (ref 70–99)
GLUCOSE BLD-MCNC: 178 MG/DL (ref 70–99)
GLUCOSE BLD-MCNC: 184 MG/DL (ref 70–99)
GLUCOSE BLD-MCNC: 98 MG/DL (ref 70–99)

## 2020-10-08 PROCEDURE — 82962 GLUCOSE BLOOD TEST: CPT

## 2020-10-08 PROCEDURE — 97110 THERAPEUTIC EXERCISES: CPT

## 2020-10-08 PROCEDURE — 97116 GAIT TRAINING THERAPY: CPT

## 2020-10-08 PROCEDURE — 6370000000 HC RX 637 (ALT 250 FOR IP): Performed by: INTERNAL MEDICINE

## 2020-10-08 PROCEDURE — 97535 SELF CARE MNGMENT TRAINING: CPT

## 2020-10-08 PROCEDURE — 1200000002 HC SEMI PRIVATE SWING BED

## 2020-10-08 PROCEDURE — 6360000002 HC RX W HCPCS: Performed by: INTERNAL MEDICINE

## 2020-10-08 RX ADMIN — DICLOFENAC 2 G: 10 GEL TOPICAL at 21:07

## 2020-10-08 RX ADMIN — CYCLOBENZAPRINE 10 MG: 10 TABLET, FILM COATED ORAL at 15:23

## 2020-10-08 RX ADMIN — DICLOFENAC 2 G: 10 GEL TOPICAL at 11:43

## 2020-10-08 RX ADMIN — ENOXAPARIN SODIUM 40 MG: 40 INJECTION SUBCUTANEOUS at 10:25

## 2020-10-08 RX ADMIN — OXYCODONE AND ACETAMINOPHEN 1 TABLET: 5; 325 TABLET ORAL at 15:23

## 2020-10-08 RX ADMIN — DOCUSATE SODIUM 50 MG AND SENNOSIDES 8.6 MG 1 TABLET: 8.6; 5 TABLET, FILM COATED ORAL at 10:22

## 2020-10-08 RX ADMIN — CYCLOBENZAPRINE 10 MG: 10 TABLET, FILM COATED ORAL at 21:06

## 2020-10-08 RX ADMIN — OXYCODONE AND ACETAMINOPHEN 1 TABLET: 5; 325 TABLET ORAL at 09:26

## 2020-10-08 RX ADMIN — CYCLOBENZAPRINE 10 MG: 10 TABLET, FILM COATED ORAL at 10:22

## 2020-10-08 RX ADMIN — INSULIN LISPRO 2 UNITS: 100 INJECTION, SOLUTION INTRAVENOUS; SUBCUTANEOUS at 12:07

## 2020-10-08 RX ADMIN — OXYCODONE AND ACETAMINOPHEN 1 TABLET: 5; 325 TABLET ORAL at 21:54

## 2020-10-08 RX ADMIN — FAMOTIDINE 20 MG: 20 TABLET ORAL at 21:07

## 2020-10-08 RX ADMIN — LOSARTAN POTASSIUM 50 MG: 50 TABLET, FILM COATED ORAL at 21:06

## 2020-10-08 RX ADMIN — GLIPIZIDE 10 MG: 10 TABLET, FILM COATED, EXTENDED RELEASE ORAL at 10:22

## 2020-10-08 RX ADMIN — ACETAMINOPHEN 650 MG: 325 TABLET ORAL at 04:00

## 2020-10-08 RX ADMIN — DOCUSATE SODIUM 50 MG AND SENNOSIDES 8.6 MG 1 TABLET: 8.6; 5 TABLET, FILM COATED ORAL at 21:07

## 2020-10-08 RX ADMIN — ACETAMINOPHEN 650 MG: 325 TABLET ORAL at 12:04

## 2020-10-08 RX ADMIN — ACETAMINOPHEN 650 MG: 325 TABLET ORAL at 17:38

## 2020-10-08 RX ADMIN — INSULIN GLARGINE 20 UNITS: 100 INJECTION, SOLUTION SUBCUTANEOUS at 21:13

## 2020-10-08 RX ADMIN — INSULIN LISPRO 1 UNITS: 100 INJECTION, SOLUTION INTRAVENOUS; SUBCUTANEOUS at 21:14

## 2020-10-08 RX ADMIN — FAMOTIDINE 20 MG: 20 TABLET ORAL at 10:20

## 2020-10-08 RX ADMIN — LOSARTAN POTASSIUM 50 MG: 50 TABLET, FILM COATED ORAL at 10:23

## 2020-10-08 ASSESSMENT — PAIN DESCRIPTION - LOCATION
LOCATION: HIP
LOCATION: BACK
LOCATION: BACK;HIP
LOCATION: BACK;HIP

## 2020-10-08 ASSESSMENT — PAIN DESCRIPTION - ONSET: ONSET: ON-GOING

## 2020-10-08 ASSESSMENT — PAIN DESCRIPTION - PAIN TYPE
TYPE: SURGICAL PAIN
TYPE: CHRONIC PAIN
TYPE: CHRONIC PAIN
TYPE: SURGICAL PAIN

## 2020-10-08 ASSESSMENT — PAIN DESCRIPTION - FREQUENCY: FREQUENCY: CONTINUOUS

## 2020-10-08 ASSESSMENT — PAIN DESCRIPTION - DESCRIPTORS: DESCRIPTORS: ACHING;SHARP;SORE

## 2020-10-08 ASSESSMENT — PAIN DESCRIPTION - PROGRESSION: CLINICAL_PROGRESSION: GRADUALLY WORSENING

## 2020-10-08 ASSESSMENT — PAIN SCALES - GENERAL
PAINLEVEL_OUTOF10: 3
PAINLEVEL_OUTOF10: 4
PAINLEVEL_OUTOF10: 4
PAINLEVEL_OUTOF10: 3
PAINLEVEL_OUTOF10: 4
PAINLEVEL_OUTOF10: 2
PAINLEVEL_OUTOF10: 3
PAINLEVEL_OUTOF10: 3
PAINLEVEL_OUTOF10: 4
PAINLEVEL_OUTOF10: 3
PAINLEVEL_OUTOF10: 3

## 2020-10-08 ASSESSMENT — PAIN DESCRIPTION - ORIENTATION
ORIENTATION: LOWER
ORIENTATION: RIGHT
ORIENTATION: LOWER
ORIENTATION: LOWER

## 2020-10-08 ASSESSMENT — PAIN - FUNCTIONAL ASSESSMENT: PAIN_FUNCTIONAL_ASSESSMENT: ACTIVITIES ARE NOT PREVENTED

## 2020-10-08 NOTE — CARE COORDINATION
SWING BED WEEKLY TEAM SHEET     Armin Boxalicia   10/8/2020 WEEK # 2    Care Management    Issues to be resolved before discharge: Increased strength, balance, and mobility    Family Education: Patient/staff to update     Discharge Plan: Home with [facility]   Patient/Family Adjustment: N/A     Goals of previous week:   [] 1st team   [] met   [] partially met    [x] not met             Why goals were not met: Continued weakness     Skilled Level of Care Remains   [x] yes   [] no    Estimated length of stay: Next insurance review date 10/8/2020  Patient/Family Concerns/input: Patient does not feel that she can return home safely at this time, lives alone, and has limited help available.       Patient/Family  Signature _________________  10/8/2020       Care Management Signature:  Reza Castro RN

## 2020-10-08 NOTE — PROGRESS NOTES
Occupational Therapy    Occupational Therapy Treatment Note  Name: Felicita Duran MRN: [id number] :   1955   Date:  10/8/2020   Admission Date: 2020 Room:  [facility] [place] 2[place]013-01   Restrictions/Precautions:  Restrictions/Precautions  Restrictions/Precautions: Weight Bearing, Fall Risk(spinal precautions)  Required Braces or Orthoses?: No     Communication with other providers:   Cleared for treatment by RN. Subjective:  Patient states: \"I would like to get a shower\"  Pain:   Location, Type, Intensity (0/10 to 10/10):  4/10 R LE    Objective:    Observation:  Pt alert and oriented    Treatment, including education:  Transfers  Supine to sit :SBA  Sit to supine :SBA  Scooting :SBA  Sit to stand :SBA  Stand to sit :SBA  SPT:SBA  Shower CGA  Toilet (BSC):SBA    Self Care Training:   Cues were given for safety, sequence, UE/LE placement, visual cues, and balance. Activities performed today included dressing, toileting, hand hygiene, and grooming. Toileting  Sup for clothing management and toilet hygiene    Grooming  Sup brush hair. Bathing   Min A for B feet. UB Dress  Sup with pull over dress    LB Dress  Dep for socks        Therapeutic Activity Training:   Therapeutic activity training was instructed today. Cues were given for safety, sequence, UE/LE placement, awareness, and balance. Activities performed today included bed mobility training, sup-sit, sit-stand, SPT. Pt able to complete functional mobility with RW x 75' x 2 with SBA . Safety Measures: Gait belt used, Left in bed, Pull/Bed Alarm activated and call light left in reach          Assessment / Impression:        Patient's tolerance of treatment: Good   Adverse Reaction: None  Significant change in status and impact:  None  Barriers to improvement: pain , dec strength and endurance. Plan for Next Session:    Continue with POC.     Time in:  1300  Time out:  1355  Timed treatment minutes:  55  Total treatment time: 55  Electronically signed by:    Bairon Oh, [address]    10/8/2020, 4:07 PM    Previously filed values:  Patient Goals   Patient goals : To return home  Short term goals  Time Frame for Short term goals: Until discharge  Short term goal 1: Pt will complete all LB ADLs mod I using AE PRN and min cues for increased functional independence  Short term goal 2: Pt will complete all functional transfers with SBA and min cues for increased functional independence.   Short term goal 3: Pt will complete all aspects of toileting mod I for increased functional independence  Short term goal 4: Pt will participate in therex/act with focus on UE strengthening and home management with min cues for increased independence with IADL tasks

## 2020-10-08 NOTE — FLOWSHEET NOTE
Physical Therapy Treatment Note  Name: Sudha Cabello MRN: [id number] :   1955   Date:  10/8/2020   Admission Date: 2020 Room:  [address] [place] [address]   Restrictions/Precautions:  Restrictions/Precautions  Restrictions/Precautions: Weight Bearing, Fall Risk(spinal precautions)  Required Braces or Orthoses?: No Lower Extremity Weight Bearing Restrictions  Right Lower Extremity Weight Bearing: Toe Touch Weight Bearing       Communication with other providers:  [facility] to see per nursing    Subjective:  Patient agreeable to working with therapy. Pain:   Pt c/o pain 2-3/10 prior to and after tx on R hip and back    Objective:    Observation:  Pt seated at EOB upon entering    Treatment, including education/measures:  Supine Therex: BLE's  APs 20x  Glut sets 10x  guad sets 10x  Heel slides 10x  Hip AB/AD 10x      Patient transferred sit<>stand from EOB with CGA  Sit-supine mod I with use of leg  on the RLE  Patient amb TTWB w/RW and CGA of 1 80ft she reported that her toes were going numb during amb.  Taking a short standing rest break  Patient left in bed with all light within reach       Assessment / Impression:       Patient's tolerance of treatment: good   Adverse Reaction: none  Significant change in status and impact:  none  Barriers to improvement:  Pain and weakness    Plan for Next Session:    Cont with PT POC    Time in:  1100  Time out: 1130  Timed treatment minutes:  30  15gt 15te  Total treatment time:  30    Previously filed items:  Social/Functional History  Lives With: Alone  Type of Home: Apartment  Home Layout: One level  Home Access: Level entry  Bathroom Shower/Tub: Tub/Shower unit  Bathroom Toilet: Standard  Bathroom Equipment: Grab bars around toilet  Bathroom Accessibility: Accessible  Receives Help From: Family  ADL Assistance: [address]: Independent  Homemaking Responsibilities: Yes  Ambulation Assistance: Independent  Transfer Assistance: Independent  Active : Yes  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   Short term goal 4: P will ambulate x 150' with RW and Celso while maintaining TTWB on RLE       Electronically signed by:    Lacy Rosales PTA   10/8/2020, 11:43 AM

## 2020-10-08 NOTE — CARE COORDINATION
CM faxed updated clinical documentation to [facility] [place] [place] to request an extension to the patient's stay in the swing bed program.

## 2020-10-09 LAB
ANION GAP SERPL CALCULATED.3IONS-SCNC: 14 MMOL/L (ref 4–16)
BASOPHILS ABSOLUTE: 0.1 K/CU MM
BASOPHILS RELATIVE PERCENT: 0.8 % (ref 0–1)
BUN BLDV-MCNC: 14 MG/DL (ref 6–23)
CALCIUM SERPL-MCNC: 8.9 MG/DL (ref 8.3–10.6)
CHLORIDE BLD-SCNC: 103 MMOL/L (ref 99–110)
CO2: 22 MMOL/L (ref 21–32)
CREAT SERPL-MCNC: 0.7 MG/DL (ref 0.6–1.1)
DIFFERENTIAL TYPE: ABNORMAL
EOSINOPHILS ABSOLUTE: 0.2 K/CU MM
EOSINOPHILS RELATIVE PERCENT: 3.1 % (ref 0–3)
GFR AFRICAN AMERICAN: >60 ML/MIN/1.73M2
GFR NON-AFRICAN AMERICAN: >60 ML/MIN/1.73M2
GLUCOSE BLD-MCNC: 100 MG/DL (ref 70–99)
GLUCOSE BLD-MCNC: 161 MG/DL (ref 70–99)
GLUCOSE BLD-MCNC: 186 MG/DL (ref 70–99)
GLUCOSE BLD-MCNC: 84 MG/DL (ref 70–99)
GLUCOSE BLD-MCNC: 92 MG/DL (ref 70–99)
HCT VFR BLD CALC: 33.1 % (ref 37–47)
HEMOGLOBIN: 10 GM/DL (ref 12.5–16)
IMMATURE NEUTROPHIL %: 0.3 % (ref 0–0.43)
LYMPHOCYTES ABSOLUTE: 1.9 K/CU MM
LYMPHOCYTES RELATIVE PERCENT: 30 % (ref 24–44)
MCH RBC QN AUTO: 29.2 PG (ref 27–31)
MCHC RBC AUTO-ENTMCNC: 30.2 % (ref 32–36)
MCV RBC AUTO: 96.5 FL (ref 78–100)
MONOCYTES ABSOLUTE: 0.5 K/CU MM
MONOCYTES RELATIVE PERCENT: 7.3 % (ref 0–4)
PDW BLD-RTO: 14.9 % (ref 11.7–14.9)
PLATELET # BLD: 571 K/CU MM (ref 140–440)
PMV BLD AUTO: 8.7 FL (ref 7.5–11.1)
POTASSIUM SERPL-SCNC: 4.2 MMOL/L (ref 3.5–5.1)
RBC # BLD: 3.43 M/CU MM (ref 4.2–5.4)
SEGMENTED NEUTROPHILS ABSOLUTE COUNT: 3.8 K/CU MM
SEGMENTED NEUTROPHILS RELATIVE PERCENT: 58.5 % (ref 36–66)
SODIUM BLD-SCNC: 139 MMOL/L (ref 135–145)
TOTAL IMMATURE NEUTOROPHIL: 0.02 K/CU MM
TOTAL RETICULOCYTE COUNT: 0.12 K/CU MM
WBC # BLD: 6.5 K/CU MM (ref 4–10.5)

## 2020-10-09 PROCEDURE — 80048 BASIC METABOLIC PNL TOTAL CA: CPT

## 2020-10-09 PROCEDURE — 97110 THERAPEUTIC EXERCISES: CPT

## 2020-10-09 PROCEDURE — 6370000000 HC RX 637 (ALT 250 FOR IP): Performed by: INTERNAL MEDICINE

## 2020-10-09 PROCEDURE — 6360000002 HC RX W HCPCS: Performed by: INTERNAL MEDICINE

## 2020-10-09 PROCEDURE — 36415 COLL VENOUS BLD VENIPUNCTURE: CPT

## 2020-10-09 PROCEDURE — 97116 GAIT TRAINING THERAPY: CPT

## 2020-10-09 PROCEDURE — 1200000002 HC SEMI PRIVATE SWING BED

## 2020-10-09 PROCEDURE — 82962 GLUCOSE BLOOD TEST: CPT

## 2020-10-09 PROCEDURE — 85025 COMPLETE CBC W/AUTO DIFF WBC: CPT

## 2020-10-09 RX ADMIN — ACETAMINOPHEN 650 MG: 325 TABLET ORAL at 07:18

## 2020-10-09 RX ADMIN — INSULIN GLARGINE 20 UNITS: 100 INJECTION, SOLUTION SUBCUTANEOUS at 20:28

## 2020-10-09 RX ADMIN — CYCLOBENZAPRINE 10 MG: 10 TABLET, FILM COATED ORAL at 08:27

## 2020-10-09 RX ADMIN — LOSARTAN POTASSIUM 50 MG: 50 TABLET, FILM COATED ORAL at 20:28

## 2020-10-09 RX ADMIN — CYCLOBENZAPRINE 10 MG: 10 TABLET, FILM COATED ORAL at 14:12

## 2020-10-09 RX ADMIN — DOCUSATE SODIUM 50 MG AND SENNOSIDES 8.6 MG 1 TABLET: 8.6; 5 TABLET, FILM COATED ORAL at 08:27

## 2020-10-09 RX ADMIN — OXYCODONE AND ACETAMINOPHEN 1 TABLET: 5; 325 TABLET ORAL at 17:39

## 2020-10-09 RX ADMIN — OXYCODONE AND ACETAMINOPHEN 1 TABLET: 5; 325 TABLET ORAL at 05:25

## 2020-10-09 RX ADMIN — GLIPIZIDE 10 MG: 10 TABLET, FILM COATED, EXTENDED RELEASE ORAL at 08:27

## 2020-10-09 RX ADMIN — ACETAMINOPHEN 650 MG: 325 TABLET ORAL at 00:32

## 2020-10-09 RX ADMIN — DICLOFENAC 2 G: 10 GEL TOPICAL at 20:32

## 2020-10-09 RX ADMIN — CYCLOBENZAPRINE 10 MG: 10 TABLET, FILM COATED ORAL at 20:28

## 2020-10-09 RX ADMIN — FAMOTIDINE 20 MG: 20 TABLET ORAL at 20:28

## 2020-10-09 RX ADMIN — ENOXAPARIN SODIUM 40 MG: 40 INJECTION SUBCUTANEOUS at 08:28

## 2020-10-09 RX ADMIN — OXYCODONE AND ACETAMINOPHEN 1 TABLET: 5; 325 TABLET ORAL at 23:53

## 2020-10-09 RX ADMIN — ACETAMINOPHEN 650 MG: 325 TABLET ORAL at 17:39

## 2020-10-09 RX ADMIN — OXYCODONE AND ACETAMINOPHEN 1 TABLET: 5; 325 TABLET ORAL at 11:38

## 2020-10-09 RX ADMIN — DOCUSATE SODIUM 50 MG AND SENNOSIDES 8.6 MG 1 TABLET: 8.6; 5 TABLET, FILM COATED ORAL at 20:28

## 2020-10-09 RX ADMIN — INSULIN LISPRO 2 UNITS: 100 INJECTION, SOLUTION INTRAVENOUS; SUBCUTANEOUS at 11:46

## 2020-10-09 RX ADMIN — ACETAMINOPHEN 650 MG: 325 TABLET ORAL at 23:53

## 2020-10-09 RX ADMIN — FAMOTIDINE 20 MG: 20 TABLET ORAL at 08:26

## 2020-10-09 RX ADMIN — LOSARTAN POTASSIUM 50 MG: 50 TABLET, FILM COATED ORAL at 08:27

## 2020-10-09 RX ADMIN — INSULIN LISPRO 1 UNITS: 100 INJECTION, SOLUTION INTRAVENOUS; SUBCUTANEOUS at 20:28

## 2020-10-09 ASSESSMENT — PAIN DESCRIPTION - ONSET
ONSET: ON-GOING
ONSET: ON-GOING

## 2020-10-09 ASSESSMENT — PAIN DESCRIPTION - LOCATION
LOCATION: BACK;LEG
LOCATION: HIP;BACK;INCISION
LOCATION: BACK;LEG

## 2020-10-09 ASSESSMENT — PAIN SCALES - GENERAL
PAINLEVEL_OUTOF10: 3
PAINLEVEL_OUTOF10: 4
PAINLEVEL_OUTOF10: 4
PAINLEVEL_OUTOF10: 3
PAINLEVEL_OUTOF10: 3
PAINLEVEL_OUTOF10: 2
PAINLEVEL_OUTOF10: 4
PAINLEVEL_OUTOF10: 4
PAINLEVEL_OUTOF10: 2
PAINLEVEL_OUTOF10: 2
PAINLEVEL_OUTOF10: 3
PAINLEVEL_OUTOF10: 2

## 2020-10-09 ASSESSMENT — PAIN DESCRIPTION - PAIN TYPE
TYPE: SURGICAL PAIN
TYPE: SURGICAL PAIN

## 2020-10-09 ASSESSMENT — PAIN DESCRIPTION - ORIENTATION
ORIENTATION: RIGHT
ORIENTATION: RIGHT

## 2020-10-09 NOTE — FLOWSHEET NOTE
Short term goals: 3 weeks  Short term goal 1: P will perform bed mobility with Celso  Short term goal 2: Pt will perform sit <> stand to RW with Celso  Short term goal 3: P will perform standing activity with 1 UE support with Celso x 3 min to return to premorbid activities.   Short term goal 4: P will ambulate x 150' with RW and Celso while maintaining TTWB on RLE       Electronically signed by:    Sathya Bustamante PTA   10/9/2020, 3:51 PM

## 2020-10-09 NOTE — PLAN OF CARE
Problem: Falls - Risk of:  Goal: Will remain free from falls  Description: Will remain free from falls  10/9/2020 0154 by Erin Alexander RN  Outcome: Ongoing  10/8/2020 1226 by Andrei Venegas RN  Outcome: Met This Shift  Goal: Absence of physical injury  Description: Absence of physical injury  10/9/2020 0154 by Erin Alexander RN  Outcome: Ongoing  10/8/2020 1226 by Andrei Venegas RN  Outcome: Met This Shift     Problem: SAFETY  Goal: LTG - Patient will demonstrate safety requirements appropriate to situation/environment  10/9/2020 0154 by Erin Alexander RN  Outcome: Ongoing  10/8/2020 1226 by Andrei Venegas RN  Outcome: Met This Shift  Goal: LTG - patient will utilize safety techniques  10/9/2020 0154 by Erin Alexander RN  Outcome: Ongoing  10/8/2020 1226 by Andrei Venegas RN  Outcome: Met This Shift  Goal: STG - patient locks brakes on wheelchair  10/9/2020 0154 by Erin Alexander RN  Outcome: Ongoing  10/8/2020 1226 by Andrei Venegas RN  Outcome: Met This Shift  Goal: STG - Patient uses call light consistently to request assistance with transfers  10/9/2020 0154 by Erin Alexander RN  Outcome: Ongoing  10/8/2020 1226 by Andrei Venegas RN  Outcome: Met This Shift  Goal: STG - patient uses gait belt during all transfers  10/9/2020 0154 by Erin Alexander RN  Outcome: Ongoing  10/8/2020 1226 by Andrei Venegas RN  Outcome: Met This Shift     Problem: Pain:  Goal: Pain level will decrease  Description: Pain level will decrease  10/9/2020 0154 by Erin Alexander RN  Outcome: Ongoing  10/8/2020 1226 by Andrei Venegas RN  Outcome: Met This Shift  Goal: Control of acute pain  Description: Control of acute pain  10/9/2020 0154 by Erin Alexander RN  Outcome: Ongoing  10/8/2020 1226 by Andrei Venegas RN  Outcome: Met This Shift  Goal: Control of chronic pain  Description: Control of chronic pain  10/9/2020 0154 by Erin Alexander RN  Outcome: Ongoing  10/8/2020 1226 by Andrei Venegas RN  Outcome: Met This Shift

## 2020-10-09 NOTE — PROGRESS NOTES
1993    10/9/2020, 11:44 AM    Previously filed values:  Patient Goals   Patient goals : To return home  Short term goals  Time Frame for Short term goals: Until discharge  Short term goal 1: Pt will complete all LB ADLs mod I using AE PRN and min cues for increased functional independence  Short term goal 2: Pt will complete all functional transfers with SBA and min cues for increased functional independence.   Short term goal 3: Pt will complete all aspects of toileting mod I for increased functional independence  Short term goal 4: Pt will participate in therex/act with focus on UE strengthening and home management with min cues for increased independence with IADL tasks

## 2020-10-10 LAB
GLUCOSE BLD-MCNC: 144 MG/DL (ref 70–99)
GLUCOSE BLD-MCNC: 159 MG/DL (ref 70–99)
GLUCOSE BLD-MCNC: 168 MG/DL (ref 70–99)
GLUCOSE BLD-MCNC: 93 MG/DL (ref 70–99)

## 2020-10-10 PROCEDURE — 97110 THERAPEUTIC EXERCISES: CPT

## 2020-10-10 PROCEDURE — 6370000000 HC RX 637 (ALT 250 FOR IP): Performed by: INTERNAL MEDICINE

## 2020-10-10 PROCEDURE — 87070 CULTURE OTHR SPECIMN AEROBIC: CPT

## 2020-10-10 PROCEDURE — 87077 CULTURE AEROBIC IDENTIFY: CPT

## 2020-10-10 PROCEDURE — 6360000002 HC RX W HCPCS: Performed by: INTERNAL MEDICINE

## 2020-10-10 PROCEDURE — 1200000002 HC SEMI PRIVATE SWING BED

## 2020-10-10 PROCEDURE — 87075 CULTR BACTERIA EXCEPT BLOOD: CPT

## 2020-10-10 PROCEDURE — 82962 GLUCOSE BLOOD TEST: CPT

## 2020-10-10 PROCEDURE — 97116 GAIT TRAINING THERAPY: CPT

## 2020-10-10 RX ADMIN — DOCUSATE SODIUM 50 MG AND SENNOSIDES 8.6 MG 1 TABLET: 8.6; 5 TABLET, FILM COATED ORAL at 20:45

## 2020-10-10 RX ADMIN — DOCUSATE SODIUM 50 MG AND SENNOSIDES 8.6 MG 1 TABLET: 8.6; 5 TABLET, FILM COATED ORAL at 09:18

## 2020-10-10 RX ADMIN — INSULIN GLARGINE 20 UNITS: 100 INJECTION, SOLUTION SUBCUTANEOUS at 20:45

## 2020-10-10 RX ADMIN — ACETAMINOPHEN 650 MG: 325 TABLET ORAL at 12:53

## 2020-10-10 RX ADMIN — DICLOFENAC 2 G: 10 GEL TOPICAL at 09:23

## 2020-10-10 RX ADMIN — FAMOTIDINE 20 MG: 20 TABLET ORAL at 09:17

## 2020-10-10 RX ADMIN — ACETAMINOPHEN 650 MG: 325 TABLET ORAL at 17:39

## 2020-10-10 RX ADMIN — INSULIN LISPRO 2 UNITS: 100 INJECTION, SOLUTION INTRAVENOUS; SUBCUTANEOUS at 17:37

## 2020-10-10 RX ADMIN — ENOXAPARIN SODIUM 40 MG: 40 INJECTION SUBCUTANEOUS at 09:19

## 2020-10-10 RX ADMIN — ACETAMINOPHEN 650 MG: 325 TABLET ORAL at 07:20

## 2020-10-10 RX ADMIN — INSULIN LISPRO 1 UNITS: 100 INJECTION, SOLUTION INTRAVENOUS; SUBCUTANEOUS at 20:46

## 2020-10-10 RX ADMIN — CYCLOBENZAPRINE 10 MG: 10 TABLET, FILM COATED ORAL at 20:45

## 2020-10-10 RX ADMIN — OXYCODONE AND ACETAMINOPHEN 1 TABLET: 5; 325 TABLET ORAL at 15:25

## 2020-10-10 RX ADMIN — CYCLOBENZAPRINE 10 MG: 10 TABLET, FILM COATED ORAL at 09:18

## 2020-10-10 RX ADMIN — CYCLOBENZAPRINE 10 MG: 10 TABLET, FILM COATED ORAL at 12:53

## 2020-10-10 RX ADMIN — LOSARTAN POTASSIUM 50 MG: 50 TABLET, FILM COATED ORAL at 09:17

## 2020-10-10 RX ADMIN — FAMOTIDINE 20 MG: 20 TABLET ORAL at 20:45

## 2020-10-10 RX ADMIN — GLIPIZIDE 10 MG: 10 TABLET, FILM COATED, EXTENDED RELEASE ORAL at 09:30

## 2020-10-10 RX ADMIN — DICLOFENAC 2 G: 10 GEL TOPICAL at 20:45

## 2020-10-10 RX ADMIN — OXYCODONE AND ACETAMINOPHEN 1 TABLET: 5; 325 TABLET ORAL at 21:48

## 2020-10-10 RX ADMIN — OXYCODONE AND ACETAMINOPHEN 1 TABLET: 5; 325 TABLET ORAL at 09:23

## 2020-10-10 RX ADMIN — LOSARTAN POTASSIUM 50 MG: 50 TABLET, FILM COATED ORAL at 20:45

## 2020-10-10 RX ADMIN — INSULIN LISPRO 2 UNITS: 100 INJECTION, SOLUTION INTRAVENOUS; SUBCUTANEOUS at 12:53

## 2020-10-10 ASSESSMENT — PAIN SCALES - GENERAL
PAINLEVEL_OUTOF10: 3
PAINLEVEL_OUTOF10: 3
PAINLEVEL_OUTOF10: 2
PAINLEVEL_OUTOF10: 2
PAINLEVEL_OUTOF10: 3
PAINLEVEL_OUTOF10: 3
PAINLEVEL_OUTOF10: 2
PAINLEVEL_OUTOF10: 4
PAINLEVEL_OUTOF10: 3
PAINLEVEL_OUTOF10: 4
PAINLEVEL_OUTOF10: 4

## 2020-10-10 ASSESSMENT — PAIN DESCRIPTION - DESCRIPTORS
DESCRIPTORS: ACHING;THROBBING
DESCRIPTORS: ACHING

## 2020-10-10 ASSESSMENT — PAIN DESCRIPTION - FREQUENCY
FREQUENCY: CONTINUOUS
FREQUENCY: INTERMITTENT

## 2020-10-10 ASSESSMENT — PAIN DESCRIPTION - ORIENTATION
ORIENTATION: RIGHT
ORIENTATION: RIGHT

## 2020-10-10 ASSESSMENT — PAIN DESCRIPTION - PROGRESSION
CLINICAL_PROGRESSION: GRADUALLY WORSENING
CLINICAL_PROGRESSION: GRADUALLY WORSENING

## 2020-10-10 ASSESSMENT — PAIN DESCRIPTION - LOCATION
LOCATION: BACK
LOCATION: INCISION;LEG

## 2020-10-10 ASSESSMENT — PAIN DESCRIPTION - PAIN TYPE
TYPE: SURGICAL PAIN

## 2020-10-10 ASSESSMENT — PAIN DESCRIPTION - ONSET: ONSET: ON-GOING

## 2020-10-10 NOTE — FLOWSHEET NOTE
Physical Therapy DailyTreatment Note   Date: 10/10/2020 Room: [unfilled]   Name: Lolly Colón : 1955   MRN: [id number]   Admission Date:2020        Rehabilitation Diagnosis: Debility [R53.81]  Debility [R53.81]    Objective                                                                                    Goals:  (Update in navigator)  Short term goals  Time Frame for Short term goals: 3 weeks  Short term goal 1: P will perform bed mobility with Celso  Short term goal 2: Pt will perform sit <> stand to RW with Celso  Short term goal 3: P will perform standing activity with 1 UE support with Celso x 3 min to return to premorbid activities. Short term goal 4: P will ambulate x 150' with RW and Celso while maintaining TTWB on RLE:   :        Plan of Care                                                                              Times per week: 6+ days per week for a minimum of 15 minutes/day  Treatment to include Current Treatment Recommendations: Strengthening, ROM, Neuromuscular Re-education, Safety Education & Training, Balance Training, Endurance Training, Functional Mobility Training, Equipment Evaluation, Education, & procurement, Transfer Training, Gait Training, Stair training, Positioning, Pain Management    Date  10/10/2020   TIMES IN/OUT   7922/4228   Restrictions/Precautions Restrictions/Precautions: Weight Bearing, Fall Risk(spinal precautions)         Current Diet/Swallowing Issues DIET CARB CONTROL; Carb Control: 4 carb choices (60 gms)/meal  Dietary Nutrition Supplements: Diabetic Oral Supplement   Communication with other providers: [x] Ok to see per nursing   [] Medical hold and reason  [] Spoke with (team    member) regarding   Subjective observations: Patient in bed upon arrival and agreeable to PT treatment. States she walked to shower and is a little tired.    [x]   Gait belt used during tx session   Pain level/location: Pre-tx 2-3/10 R hip   Post-tx 2-3/10   Bed Mobility   Min A Hold pending MD visit [ ] Discharged    Billing:  Billed units: 32' (1 gait/ 1 TE)      Signed: Nathalia Rojas, 10/10/2020, 8:51 AM  PT [id number], MISAEL, AT, RADHA BILL  10/10/2020  12:16 PM

## 2020-10-10 NOTE — PLAN OF CARE
Problem: Falls - Risk of:  Goal: Will remain free from falls  Description: Will remain free from falls  10/10/2020 0753 by Jemima Kapoor RN  Outcome: Ongoing  10/9/2020 1939 by Ana Florez RN  Outcome: Ongoing  Goal: Absence of physical injury  Description: Absence of physical injury  10/10/2020 0753 by Jemima Kapoor RN  Outcome: Ongoing  10/9/2020 1939 by Ana Florez RN  Outcome: Ongoing     Problem: SAFETY  Goal: LTG - Patient will demonstrate safety requirements appropriate to situation/environment  10/10/2020 0753 by Jemima Kapoor RN  Outcome: Ongoing  10/9/2020 1939 by Ana Florez RN  Outcome: Ongoing  Goal: LTG - patient will utilize safety techniques  10/10/2020 0753 by Jemima Kapoor RN  Outcome: Ongoing  10/9/2020 1939 by Ana Florez RN  Outcome: Ongoing  Goal: STG - patient locks brakes on wheelchair  10/10/2020 0753 by Jemima Kapoor RN  Outcome: Ongoing  10/9/2020 1939 by Ana Florez RN  Outcome: Ongoing  Goal: STG - Patient uses call light consistently to request assistance with transfers  10/10/2020 0753 by Jemima Kapoor RN  Outcome: Ongoing  10/9/2020 1939 by Ana Florez RN  Outcome: Ongoing  Goal: STG - patient uses gait belt during all transfers  10/10/2020 0753 by Jemima Kapoor RN  Outcome: Ongoing  10/9/2020 1939 by Ana Florez RN  Outcome: Ongoing     Problem: Pain:  Goal: Pain level will decrease  Description: Pain level will decrease  10/10/2020 0753 by Jemima Kapoor RN  Outcome: Ongoing  10/9/2020 1939 by Ana Florez RN  Outcome: Ongoing  Goal: Control of acute pain  Description: Control of acute pain  10/10/2020 0753 by Jemima Kapoor RN  Outcome: Ongoing  10/9/2020 1939 by Ana Florez RN  Outcome: Ongoing  Goal: Control of chronic pain  Description: Control of chronic pain  10/10/2020 0753 by Jemima Kapoor RN  Outcome: Ongoing  10/9/2020 1939 by Marybeth Logan RN  Outcome: Ongoing

## 2020-10-11 LAB
ANION GAP SERPL CALCULATED.3IONS-SCNC: 10 MMOL/L (ref 4–16)
BASOPHILS ABSOLUTE: 0.1 K/CU MM
BASOPHILS RELATIVE PERCENT: 0.8 % (ref 0–1)
BUN BLDV-MCNC: 15 MG/DL (ref 6–23)
CALCIUM SERPL-MCNC: 9.3 MG/DL (ref 8.3–10.6)
CHLORIDE BLD-SCNC: 103 MMOL/L (ref 99–110)
CO2: 25 MMOL/L (ref 21–32)
CREAT SERPL-MCNC: 0.6 MG/DL (ref 0.6–1.1)
DIFFERENTIAL TYPE: ABNORMAL
EOSINOPHILS ABSOLUTE: 0.2 K/CU MM
EOSINOPHILS RELATIVE PERCENT: 3.2 % (ref 0–3)
GFR AFRICAN AMERICAN: >60 ML/MIN/1.73M2
GFR NON-AFRICAN AMERICAN: >60 ML/MIN/1.73M2
GLUCOSE BLD-MCNC: 102 MG/DL (ref 70–99)
GLUCOSE BLD-MCNC: 136 MG/DL (ref 70–99)
GLUCOSE BLD-MCNC: 141 MG/DL (ref 70–99)
GLUCOSE BLD-MCNC: 173 MG/DL (ref 70–99)
GLUCOSE BLD-MCNC: 84 MG/DL (ref 70–99)
HCT VFR BLD CALC: 37.4 % (ref 37–47)
HEMOGLOBIN: 11.3 GM/DL (ref 12.5–16)
IMMATURE NEUTROPHIL %: 0.2 % (ref 0–0.43)
LYMPHOCYTES ABSOLUTE: 1.8 K/CU MM
LYMPHOCYTES RELATIVE PERCENT: 29.6 % (ref 24–44)
MCH RBC QN AUTO: 28.5 PG (ref 27–31)
MCHC RBC AUTO-ENTMCNC: 30.2 % (ref 32–36)
MCV RBC AUTO: 94.4 FL (ref 78–100)
MONOCYTES ABSOLUTE: 0.5 K/CU MM
MONOCYTES RELATIVE PERCENT: 8.6 % (ref 0–4)
PDW BLD-RTO: 14.7 % (ref 11.7–14.9)
PLATELET # BLD: 574 K/CU MM (ref 140–440)
PMV BLD AUTO: 8.6 FL (ref 7.5–11.1)
POTASSIUM SERPL-SCNC: 4.1 MMOL/L (ref 3.5–5.1)
RBC # BLD: 3.96 M/CU MM (ref 4.2–5.4)
SEGMENTED NEUTROPHILS ABSOLUTE COUNT: 3.6 K/CU MM
SEGMENTED NEUTROPHILS RELATIVE PERCENT: 57.6 % (ref 36–66)
SODIUM BLD-SCNC: 138 MMOL/L (ref 135–145)
TOTAL IMMATURE NEUTOROPHIL: 0.01 K/CU MM
WBC # BLD: 6.2 K/CU MM (ref 4–10.5)

## 2020-10-11 PROCEDURE — 6370000000 HC RX 637 (ALT 250 FOR IP): Performed by: INTERNAL MEDICINE

## 2020-10-11 PROCEDURE — 82962 GLUCOSE BLOOD TEST: CPT

## 2020-10-11 PROCEDURE — 80048 BASIC METABOLIC PNL TOTAL CA: CPT

## 2020-10-11 PROCEDURE — 1200000002 HC SEMI PRIVATE SWING BED

## 2020-10-11 PROCEDURE — 85025 COMPLETE CBC W/AUTO DIFF WBC: CPT

## 2020-10-11 PROCEDURE — 6360000002 HC RX W HCPCS: Performed by: INTERNAL MEDICINE

## 2020-10-11 PROCEDURE — 94761 N-INVAS EAR/PLS OXIMETRY MLT: CPT

## 2020-10-11 PROCEDURE — 36415 COLL VENOUS BLD VENIPUNCTURE: CPT

## 2020-10-11 RX ADMIN — INSULIN GLARGINE 20 UNITS: 100 INJECTION, SOLUTION SUBCUTANEOUS at 20:49

## 2020-10-11 RX ADMIN — DICLOFENAC 2 G: 10 GEL TOPICAL at 09:00

## 2020-10-11 RX ADMIN — DOCUSATE SODIUM 50 MG AND SENNOSIDES 8.6 MG 1 TABLET: 8.6; 5 TABLET, FILM COATED ORAL at 20:49

## 2020-10-11 RX ADMIN — ACETAMINOPHEN 650 MG: 325 TABLET ORAL at 18:32

## 2020-10-11 RX ADMIN — OXYCODONE AND ACETAMINOPHEN 1 TABLET: 5; 325 TABLET ORAL at 04:53

## 2020-10-11 RX ADMIN — LOSARTAN POTASSIUM 50 MG: 50 TABLET, FILM COATED ORAL at 20:49

## 2020-10-11 RX ADMIN — ENOXAPARIN SODIUM 40 MG: 40 INJECTION SUBCUTANEOUS at 08:59

## 2020-10-11 RX ADMIN — DICLOFENAC 2 G: 10 GEL TOPICAL at 20:49

## 2020-10-11 RX ADMIN — INSULIN LISPRO 2 UNITS: 100 INJECTION, SOLUTION INTRAVENOUS; SUBCUTANEOUS at 13:36

## 2020-10-11 RX ADMIN — GLIPIZIDE 10 MG: 10 TABLET, FILM COATED, EXTENDED RELEASE ORAL at 08:59

## 2020-10-11 RX ADMIN — CYCLOBENZAPRINE 10 MG: 10 TABLET, FILM COATED ORAL at 08:59

## 2020-10-11 RX ADMIN — CYCLOBENZAPRINE 10 MG: 10 TABLET, FILM COATED ORAL at 20:49

## 2020-10-11 RX ADMIN — FAMOTIDINE 20 MG: 20 TABLET ORAL at 20:49

## 2020-10-11 RX ADMIN — DOCUSATE SODIUM 50 MG AND SENNOSIDES 8.6 MG 1 TABLET: 8.6; 5 TABLET, FILM COATED ORAL at 08:59

## 2020-10-11 RX ADMIN — OXYCODONE AND ACETAMINOPHEN 1 TABLET: 5; 325 TABLET ORAL at 11:58

## 2020-10-11 RX ADMIN — ACETAMINOPHEN 650 MG: 325 TABLET ORAL at 11:58

## 2020-10-11 RX ADMIN — OXYCODONE AND ACETAMINOPHEN 1 TABLET: 5; 325 TABLET ORAL at 18:32

## 2020-10-11 RX ADMIN — ACETAMINOPHEN 650 MG: 325 TABLET ORAL at 06:16

## 2020-10-11 RX ADMIN — CYCLOBENZAPRINE 10 MG: 10 TABLET, FILM COATED ORAL at 15:06

## 2020-10-11 RX ADMIN — FAMOTIDINE 20 MG: 20 TABLET ORAL at 08:59

## 2020-10-11 RX ADMIN — LOSARTAN POTASSIUM 50 MG: 50 TABLET, FILM COATED ORAL at 08:59

## 2020-10-11 ASSESSMENT — PAIN DESCRIPTION - PROGRESSION
CLINICAL_PROGRESSION: GRADUALLY WORSENING
CLINICAL_PROGRESSION: GRADUALLY IMPROVING

## 2020-10-11 ASSESSMENT — PAIN - FUNCTIONAL ASSESSMENT
PAIN_FUNCTIONAL_ASSESSMENT: ACTIVITIES ARE NOT PREVENTED

## 2020-10-11 ASSESSMENT — PAIN SCALES - GENERAL
PAINLEVEL_OUTOF10: 4
PAINLEVEL_OUTOF10: 2
PAINLEVEL_OUTOF10: 4
PAINLEVEL_OUTOF10: 4
PAINLEVEL_OUTOF10: 3
PAINLEVEL_OUTOF10: 2
PAINLEVEL_OUTOF10: 2

## 2020-10-11 ASSESSMENT — PAIN DESCRIPTION - DESCRIPTORS
DESCRIPTORS: ACHING
DESCRIPTORS: ACHING
DESCRIPTORS: ACHING;THROBBING

## 2020-10-11 ASSESSMENT — PAIN DESCRIPTION - FREQUENCY
FREQUENCY: CONTINUOUS

## 2020-10-11 ASSESSMENT — PAIN DESCRIPTION - LOCATION
LOCATION: BACK;LEG
LOCATION: BACK
LOCATION: INCISION;LEG

## 2020-10-11 ASSESSMENT — PAIN DESCRIPTION - ONSET
ONSET: ON-GOING

## 2020-10-11 ASSESSMENT — PAIN DESCRIPTION - PAIN TYPE
TYPE: SURGICAL PAIN
TYPE: CHRONIC PAIN
TYPE: SURGICAL PAIN

## 2020-10-11 ASSESSMENT — PAIN DESCRIPTION - ORIENTATION
ORIENTATION: LOWER
ORIENTATION: LOWER;RIGHT
ORIENTATION: RIGHT

## 2020-10-12 LAB
GLUCOSE BLD-MCNC: 151 MG/DL (ref 70–99)
GLUCOSE BLD-MCNC: 164 MG/DL (ref 70–99)
GLUCOSE BLD-MCNC: 188 MG/DL (ref 70–99)
GLUCOSE BLD-MCNC: 90 MG/DL (ref 70–99)

## 2020-10-12 PROCEDURE — 97530 THERAPEUTIC ACTIVITIES: CPT

## 2020-10-12 PROCEDURE — 94761 N-INVAS EAR/PLS OXIMETRY MLT: CPT

## 2020-10-12 PROCEDURE — 97116 GAIT TRAINING THERAPY: CPT

## 2020-10-12 PROCEDURE — 6360000002 HC RX W HCPCS: Performed by: INTERNAL MEDICINE

## 2020-10-12 PROCEDURE — 1200000002 HC SEMI PRIVATE SWING BED

## 2020-10-12 PROCEDURE — 6370000000 HC RX 637 (ALT 250 FOR IP): Performed by: INTERNAL MEDICINE

## 2020-10-12 PROCEDURE — 97110 THERAPEUTIC EXERCISES: CPT

## 2020-10-12 PROCEDURE — 82962 GLUCOSE BLOOD TEST: CPT

## 2020-10-12 RX ORDER — HYDROCODONE BITARTRATE AND ACETAMINOPHEN 5; 325 MG/1; MG/1
1 TABLET ORAL EVERY 6 HOURS PRN
Status: DISCONTINUED | OUTPATIENT
Start: 2020-10-12 | End: 2020-10-13

## 2020-10-12 RX ORDER — ACETAMINOPHEN 325 MG/1
325 TABLET ORAL EVERY 6 HOURS PRN
Status: DISCONTINUED | OUTPATIENT
Start: 2020-10-12 | End: 2020-10-16 | Stop reason: HOSPADM

## 2020-10-12 RX ADMIN — LOSARTAN POTASSIUM 50 MG: 50 TABLET, FILM COATED ORAL at 20:28

## 2020-10-12 RX ADMIN — CYCLOBENZAPRINE 10 MG: 10 TABLET, FILM COATED ORAL at 20:28

## 2020-10-12 RX ADMIN — CYCLOBENZAPRINE 10 MG: 10 TABLET, FILM COATED ORAL at 09:45

## 2020-10-12 RX ADMIN — CYCLOBENZAPRINE 10 MG: 10 TABLET, FILM COATED ORAL at 13:17

## 2020-10-12 RX ADMIN — DOCUSATE SODIUM 50 MG AND SENNOSIDES 8.6 MG 1 TABLET: 8.6; 5 TABLET, FILM COATED ORAL at 09:45

## 2020-10-12 RX ADMIN — ACETAMINOPHEN 650 MG: 325 TABLET ORAL at 17:09

## 2020-10-12 RX ADMIN — DOCUSATE SODIUM 50 MG AND SENNOSIDES 8.6 MG 1 TABLET: 8.6; 5 TABLET, FILM COATED ORAL at 20:28

## 2020-10-12 RX ADMIN — OXYCODONE AND ACETAMINOPHEN 1 TABLET: 5; 325 TABLET ORAL at 20:28

## 2020-10-12 RX ADMIN — DICLOFENAC 2 G: 10 GEL TOPICAL at 10:30

## 2020-10-12 RX ADMIN — INSULIN LISPRO 2 UNITS: 100 INJECTION, SOLUTION INTRAVENOUS; SUBCUTANEOUS at 17:09

## 2020-10-12 RX ADMIN — ACETAMINOPHEN 650 MG: 325 TABLET ORAL at 00:26

## 2020-10-12 RX ADMIN — ENOXAPARIN SODIUM 40 MG: 40 INJECTION SUBCUTANEOUS at 09:45

## 2020-10-12 RX ADMIN — ACETAMINOPHEN 650 MG: 325 TABLET ORAL at 06:26

## 2020-10-12 RX ADMIN — DICLOFENAC 2 G: 10 GEL TOPICAL at 20:28

## 2020-10-12 RX ADMIN — OXYCODONE AND ACETAMINOPHEN 1 TABLET: 5; 325 TABLET ORAL at 00:26

## 2020-10-12 RX ADMIN — OXYCODONE AND ACETAMINOPHEN 1 TABLET: 5; 325 TABLET ORAL at 13:17

## 2020-10-12 RX ADMIN — LOSARTAN POTASSIUM 50 MG: 50 TABLET, FILM COATED ORAL at 09:46

## 2020-10-12 RX ADMIN — INSULIN LISPRO 2 UNITS: 100 INJECTION, SOLUTION INTRAVENOUS; SUBCUTANEOUS at 11:52

## 2020-10-12 RX ADMIN — OXYCODONE AND ACETAMINOPHEN 1 TABLET: 5; 325 TABLET ORAL at 06:26

## 2020-10-12 RX ADMIN — GLIPIZIDE 10 MG: 10 TABLET, FILM COATED, EXTENDED RELEASE ORAL at 09:46

## 2020-10-12 RX ADMIN — INSULIN GLARGINE 20 UNITS: 100 INJECTION, SOLUTION SUBCUTANEOUS at 20:28

## 2020-10-12 RX ADMIN — INSULIN LISPRO 1 UNITS: 100 INJECTION, SOLUTION INTRAVENOUS; SUBCUTANEOUS at 20:28

## 2020-10-12 RX ADMIN — FAMOTIDINE 20 MG: 20 TABLET ORAL at 09:46

## 2020-10-12 RX ADMIN — FAMOTIDINE 20 MG: 20 TABLET ORAL at 20:27

## 2020-10-12 ASSESSMENT — PAIN - FUNCTIONAL ASSESSMENT: PAIN_FUNCTIONAL_ASSESSMENT: ACTIVITIES ARE NOT PREVENTED

## 2020-10-12 ASSESSMENT — PAIN DESCRIPTION - FREQUENCY: FREQUENCY: CONTINUOUS

## 2020-10-12 ASSESSMENT — PAIN SCALES - GENERAL
PAINLEVEL_OUTOF10: 2
PAINLEVEL_OUTOF10: 1
PAINLEVEL_OUTOF10: 3
PAINLEVEL_OUTOF10: 4
PAINLEVEL_OUTOF10: 2
PAINLEVEL_OUTOF10: 4
PAINLEVEL_OUTOF10: 3
PAINLEVEL_OUTOF10: 4
PAINLEVEL_OUTOF10: 5

## 2020-10-12 ASSESSMENT — PAIN DESCRIPTION - PAIN TYPE
TYPE: SURGICAL PAIN
TYPE: SURGICAL PAIN

## 2020-10-12 ASSESSMENT — PAIN DESCRIPTION - ONSET: ONSET: ON-GOING

## 2020-10-12 ASSESSMENT — PAIN DESCRIPTION - DESCRIPTORS: DESCRIPTORS: ACHING;THROBBING

## 2020-10-12 ASSESSMENT — PAIN DESCRIPTION - PROGRESSION: CLINICAL_PROGRESSION: GRADUALLY WORSENING

## 2020-10-12 ASSESSMENT — PAIN DESCRIPTION - ORIENTATION: ORIENTATION: RIGHT

## 2020-10-12 ASSESSMENT — PAIN DESCRIPTION - LOCATION: LOCATION: LEG

## 2020-10-12 NOTE — PROGRESS NOTES
Occupational Therapy    Occupational Therapy Treatment Note  Name: Sreedhar Jama MRN: [id number] :   1955   Date:  10/12/2020   Admission Date: 2020 Room:  [facility]/013-01   Restrictions/Precautions:  Restrictions/Precautions  Restrictions/Precautions: Weight Bearing, Fall Risk(spinal precautions)  Required Braces or Orthoses?: No   Communication with other providers:  [id number] Christa Garcia to see per nurse  Subjective:  Patient states: I will walk  Pain:   Location, Type, Intensity (0/10 to 10/10):  5/10 R hip  Objective:    Observation:  Pt was in bed, had just transferred to bed from toilet  Objective Measures: Pt was seen for walking and exercise  Treatment, including education:   Pt was CGA for bed mobility supine to sit on EOB. Pt was CGA sit to stand. She ambulated 60 ft  Adhering to toe touch wt bearing on R with RW. She was SBA for sit to supine back in bed. Pt was tired  So OT returned 45 min later for bed exercises 10 reps of cane ex shoulder flex/ext, abd/add, circles, rowing, elbow curls. Pt was left in bed call light on table next to her  Assessment / Impression:        Patient's tolerance of treatment:  good  Adverse Reaction: no  Significant change in status and impact:  No  Barriers to improvement:  Decreased endurance, pain  Plan for Next Session:    adls or transfers  Time in:  1540  1645  Time out: 1555   1700  Timed treatment minutes: 30  Total treatment time:  30  Electronically signed by:    Becky Bustamante,   10/12/2020, 6:37 PM    Previously filed values:  Patient Goals   Patient goals : To return home  Short term goals  Time Frame for Short term goals: Until discharge  Short term goal 1: Pt will complete all LB ADLs mod I using AE PRN and min cues for increased functional independence  Short term goal 2: Pt will complete all functional transfers with SBA and min cues for increased functional independence.   Short term goal 3: Pt will complete all aspects of toileting mod I for increased functional independence  Short term goal 4: Pt will participate in therex/act with focus on UE strengthening and home management with min cues for increased independence with IADL tasks

## 2020-10-12 NOTE — FLOWSHEET NOTE
Physical Therapy DailyTreatment Note   Date: 10/12/2020 Room: [unfilled]   Name: Hafsa Byers : 1955   MRN: [id number]   Admission Date:2020        Rehabilitation Diagnosis: Debility [R53.81]  Debility [R53.81]    Objective                                                                                    Goals:  (Update in navigator)  Short term goals  Time Frame for Short term goals: 3 weeks  Short term goal 1: P will perform bed mobility with Celso  Short term goal 2: Pt will perform sit <> stand to RW with Celso  Short term goal 3: P will perform standing activity with 1 UE support with Celso x 3 min to return to premorbid activities. Short term goal 4: P will ambulate x 150' with RW and Celso while maintaining TTWB on RLE:   :        Plan of Care                                                                              Times per week: 6+ days per week for a minimum of 15 minutes/day  Treatment to include Current Treatment Recommendations: Strengthening, ROM, Neuromuscular Re-education, Safety Education & Training, Balance Training, Endurance Training, Functional Mobility Training, Equipment Evaluation, Education, & procurement, Transfer Training, Gait Training, Stair training, Positioning, Pain Management    Date  10/12/2020   TIMES IN/OUT   [id number]   Restrictions/Precautions Restrictions/Precautions: Weight Bearing, Fall Risk(spinal precautions)         Current Diet/Swallowing Issues DIET CARB CONTROL; Carb Control: 4 carb choices (60 gms)/meal  Dietary Nutrition Supplements: Diabetic Oral Supplement   Communication with other providers: [x] Ok to see per nursing   [] Medical hold and reason  [] Spoke with (team    member) regarding   Subjective observations: Patient in bed upon arrival and agreeable to PT treatment. States she walked to shower and is a little tired.    [x]   Gait belt used during tx session   Pain level/location: Pre-tx 2/10 R hip   Post-tx 3-4/10  R hip- low back   Bed Mobility Min A     Transfers Sit to stand CG assist  Stand to sit CG assist     Standing Tolerance    Amb/Locomotion: AD/Distance/Assist Pt ambulated 70 feet with CG assist using standard walker, able to maintain TTWB status independently. Strategies that improved performance: encouragement   Barriers to progress/participation: fatigue   Alarm placed/where?   Fall Risk  Jaye ] left in bed  [ ] left in chair Jaey ] call light within reach  [ ] bed alarm on  [ ] personal alarm on [ ] [de-identified]  [ ] other staff present:   Discharge recommendations  Anticipated discharge date:  TBD  Destination: []home alone   []home alone with assist prn  []Continuous supervision  []SNF  [] Assisted living   Continued therapy: []HHC PT  []OUTPATIENT  PT   [] No Further PT  Equipment needs: See eval     Therapeutic activities/exercises completed this date: B LE strengthening     Modality/intervention used:   Jaye ] Therapeutic Exercise    [ ] Modalities:   Jaye ] Therapeutic Activity    [ ] Ultrasound   [ ] Elec Stim   Jaye ] Gait Training     [ ] Cervical Traction  [ ] Lumbar Traction   [ ] Neuromuscular Re-education   [ ] Cold/hotpack  [ ] Iontophoresis   [ ] Instruction in HEP     [ ] Vasopneumatic   [ ] Manual Therapy   [ ] Aquatic Therapy     Patient/Caregiver Education and Training: TTWB    Exercise/Equipment/Modalities this date    R, hip abd 10x , ankle DF/PF 20x gluteal/quad sets x 10                    Treatment Plan for Next Session: progress ambulation and R LE strength    Assessment / Impression                                                          Treatment/Activity Tolerance:   [x] Tolerated Treatment well:     [x] Patient limited by fatigue/pain:       [] Patient limited by medical complications:    [] Adverse Reaction to Tx:   [] Significant change in status    Plan:   Jaye ] Continue per plan of care [ ] Brandyn Singh current plan   [ ] Plan of care initiated [ ] Hold pending MD visit [ ] Discharged    Billing:  Billed units: 32' (1 gait/ 1 TE)      Signed: Mani Ortiz,PT 10/12/2020, 12:41 PM    10/12/2020  12:41 PM

## 2020-10-13 LAB
ANION GAP SERPL CALCULATED.3IONS-SCNC: 14 MMOL/L (ref 4–16)
BASOPHILS ABSOLUTE: 0.1 K/CU MM
BASOPHILS RELATIVE PERCENT: 0.6 % (ref 0–1)
BUN BLDV-MCNC: 18 MG/DL (ref 6–23)
CALCIUM SERPL-MCNC: 9.7 MG/DL (ref 8.3–10.6)
CHLORIDE BLD-SCNC: 102 MMOL/L (ref 99–110)
CO2: 24 MMOL/L (ref 21–32)
CREAT SERPL-MCNC: 0.7 MG/DL (ref 0.6–1.1)
DIFFERENTIAL TYPE: ABNORMAL
EOSINOPHILS ABSOLUTE: 0.2 K/CU MM
EOSINOPHILS RELATIVE PERCENT: 2 % (ref 0–3)
GFR AFRICAN AMERICAN: >60 ML/MIN/1.73M2
GFR NON-AFRICAN AMERICAN: >60 ML/MIN/1.73M2
GLUCOSE BLD-MCNC: 134 MG/DL (ref 70–99)
GLUCOSE BLD-MCNC: 146 MG/DL (ref 70–99)
GLUCOSE BLD-MCNC: 154 MG/DL (ref 70–99)
GLUCOSE BLD-MCNC: 168 MG/DL (ref 70–99)
GLUCOSE BLD-MCNC: 82 MG/DL (ref 70–99)
HCT VFR BLD CALC: 38.7 % (ref 37–47)
HEMOGLOBIN: 11.8 GM/DL (ref 12.5–16)
IMMATURE NEUTROPHIL %: 0.2 % (ref 0–0.43)
LYMPHOCYTES ABSOLUTE: 1.5 K/CU MM
LYMPHOCYTES RELATIVE PERCENT: 18.8 % (ref 24–44)
MCH RBC QN AUTO: 28.4 PG (ref 27–31)
MCHC RBC AUTO-ENTMCNC: 30.5 % (ref 32–36)
MCV RBC AUTO: 93.3 FL (ref 78–100)
MONOCYTES ABSOLUTE: 0.5 K/CU MM
MONOCYTES RELATIVE PERCENT: 5.7 % (ref 0–4)
PDW BLD-RTO: 14.6 % (ref 11.7–14.9)
PLATELET # BLD: 604 K/CU MM (ref 140–440)
PMV BLD AUTO: 9.2 FL (ref 7.5–11.1)
POTASSIUM SERPL-SCNC: 4.3 MMOL/L (ref 3.5–5.1)
RBC # BLD: 4.15 M/CU MM (ref 4.2–5.4)
SEGMENTED NEUTROPHILS ABSOLUTE COUNT: 5.9 K/CU MM
SEGMENTED NEUTROPHILS RELATIVE PERCENT: 72.7 % (ref 36–66)
SODIUM BLD-SCNC: 140 MMOL/L (ref 135–145)
TOTAL IMMATURE NEUTOROPHIL: 0.02 K/CU MM
WBC # BLD: 8.1 K/CU MM (ref 4–10.5)

## 2020-10-13 PROCEDURE — 1200000002 HC SEMI PRIVATE SWING BED

## 2020-10-13 PROCEDURE — 36415 COLL VENOUS BLD VENIPUNCTURE: CPT

## 2020-10-13 PROCEDURE — 97116 GAIT TRAINING THERAPY: CPT

## 2020-10-13 PROCEDURE — 6370000000 HC RX 637 (ALT 250 FOR IP): Performed by: INTERNAL MEDICINE

## 2020-10-13 PROCEDURE — 6360000002 HC RX W HCPCS: Performed by: INTERNAL MEDICINE

## 2020-10-13 PROCEDURE — 97530 THERAPEUTIC ACTIVITIES: CPT

## 2020-10-13 PROCEDURE — 6370000000 HC RX 637 (ALT 250 FOR IP): Performed by: NURSE PRACTITIONER

## 2020-10-13 PROCEDURE — 94761 N-INVAS EAR/PLS OXIMETRY MLT: CPT

## 2020-10-13 PROCEDURE — 85025 COMPLETE CBC W/AUTO DIFF WBC: CPT

## 2020-10-13 PROCEDURE — 80048 BASIC METABOLIC PNL TOTAL CA: CPT

## 2020-10-13 PROCEDURE — 97535 SELF CARE MNGMENT TRAINING: CPT

## 2020-10-13 PROCEDURE — 82962 GLUCOSE BLOOD TEST: CPT

## 2020-10-13 PROCEDURE — 97110 THERAPEUTIC EXERCISES: CPT

## 2020-10-13 RX ORDER — HYDROCODONE BITARTRATE AND ACETAMINOPHEN 5; 325 MG/1; MG/1
1 TABLET ORAL EVERY 8 HOURS
Status: DISCONTINUED | OUTPATIENT
Start: 2020-10-13 | End: 2020-10-16 | Stop reason: HOSPADM

## 2020-10-13 RX ADMIN — OXYCODONE AND ACETAMINOPHEN 1 TABLET: 5; 325 TABLET ORAL at 02:31

## 2020-10-13 RX ADMIN — ACETAMINOPHEN 650 MG: 325 TABLET ORAL at 17:02

## 2020-10-13 RX ADMIN — ACETAMINOPHEN 650 MG: 325 TABLET ORAL at 11:58

## 2020-10-13 RX ADMIN — DOCUSATE SODIUM 50 MG AND SENNOSIDES 8.6 MG 1 TABLET: 8.6; 5 TABLET, FILM COATED ORAL at 21:46

## 2020-10-13 RX ADMIN — ENOXAPARIN SODIUM 40 MG: 40 INJECTION SUBCUTANEOUS at 09:46

## 2020-10-13 RX ADMIN — ACETAMINOPHEN 650 MG: 325 TABLET ORAL at 06:25

## 2020-10-13 RX ADMIN — ACETAMINOPHEN 650 MG: 325 TABLET ORAL at 00:07

## 2020-10-13 RX ADMIN — LOSARTAN POTASSIUM 50 MG: 50 TABLET, FILM COATED ORAL at 09:47

## 2020-10-13 RX ADMIN — FAMOTIDINE 20 MG: 20 TABLET ORAL at 09:46

## 2020-10-13 RX ADMIN — DICLOFENAC 2 G: 10 GEL TOPICAL at 21:47

## 2020-10-13 RX ADMIN — INSULIN LISPRO 2 UNITS: 100 INJECTION, SOLUTION INTRAVENOUS; SUBCUTANEOUS at 17:01

## 2020-10-13 RX ADMIN — CYCLOBENZAPRINE 10 MG: 10 TABLET, FILM COATED ORAL at 21:46

## 2020-10-13 RX ADMIN — INSULIN LISPRO 2 UNITS: 100 INJECTION, SOLUTION INTRAVENOUS; SUBCUTANEOUS at 11:58

## 2020-10-13 RX ADMIN — GLIPIZIDE 10 MG: 10 TABLET, FILM COATED, EXTENDED RELEASE ORAL at 09:47

## 2020-10-13 RX ADMIN — CYCLOBENZAPRINE 10 MG: 10 TABLET, FILM COATED ORAL at 09:46

## 2020-10-13 RX ADMIN — FAMOTIDINE 20 MG: 20 TABLET ORAL at 21:46

## 2020-10-13 RX ADMIN — ACETAMINOPHEN 325 MG: 325 TABLET ORAL at 21:47

## 2020-10-13 RX ADMIN — CYCLOBENZAPRINE 10 MG: 10 TABLET, FILM COATED ORAL at 14:14

## 2020-10-13 RX ADMIN — LOSARTAN POTASSIUM 50 MG: 50 TABLET, FILM COATED ORAL at 21:46

## 2020-10-13 RX ADMIN — DOCUSATE SODIUM 50 MG AND SENNOSIDES 8.6 MG 1 TABLET: 8.6; 5 TABLET, FILM COATED ORAL at 09:47

## 2020-10-13 RX ADMIN — DICLOFENAC 2 G: 10 GEL TOPICAL at 11:58

## 2020-10-13 RX ADMIN — HYDROCODONE BITARTRATE AND ACETAMINOPHEN 1 TABLET: 5; 325 TABLET ORAL at 09:47

## 2020-10-13 RX ADMIN — INSULIN GLARGINE 20 UNITS: 100 INJECTION, SOLUTION SUBCUTANEOUS at 21:47

## 2020-10-13 ASSESSMENT — PAIN SCALES - GENERAL
PAINLEVEL_OUTOF10: 3
PAINLEVEL_OUTOF10: 1
PAINLEVEL_OUTOF10: 3
PAINLEVEL_OUTOF10: 2
PAINLEVEL_OUTOF10: 7
PAINLEVEL_OUTOF10: 4
PAINLEVEL_OUTOF10: 2
PAINLEVEL_OUTOF10: 3

## 2020-10-13 NOTE — PROGRESS NOTES
Physical Therapy    Physical Therapy Treatment Note  Name: Leydi Williamson MRN: [id number] :   1955   Date:  10/13/2020   Admission Date: 2020 Room:  [address] [place] [address]   Restrictions/Precautions:  Restrictions/Precautions  Restrictions/Precautions: Weight Bearing, Fall Risk(spinal precautions)  Required Braces or Orthoses?: No Lower Extremity Weight Bearing Restrictions  Right Lower Extremity Weight Bearing: Toe Touch Weight Bearing    Communication with other providers:  Updated p progress in rounds  Subjective:  Patient states:  \"I feel better than earlier\"  Pain:   Location, Type, Intensity (0/10 to 10/10):  RLE pain  Objective:    Observation:  P supine in bed  Treatment, including education/measures:  Bed mobility: supine to sit Celso, sit to supine Celso with leg   Transfers: sit <> stand to RW with CGA, p able maintain toe touch on R with increased time to come to full standing  Gait: Ambulates with RW with CGA, decreased alexi, decreased step length, significant weight bearing through BUEs x 100'. P with standing rest break 2/2 fatigue  There. Ex: seated knee flexion bends x 10 reps, supine heel slides x 5 reps. P with significantly reduced knee flexion range with pain  Education: Educated on plan of care and goals to achieve. Discussed plan for car transfers  P left supine in bed with call light within reach  Assessment / Impression:    P continues to require increased time for mobility and assist for safety.  Recommend continued skilled PT to address deficits and maximize functional potential.   Patient's tolerance of treatment:  good   Adverse Reaction: pain  Significant change in status and impact:  Improved bed mobility  Barriers to improvement:  pain  Plan for Next Session:    Work on transfers, gait, knee ROM  Time in:  1535  Time out:  1600  Timed treatment minutes:  25  Total treatment time:  25    Previously filed items:  Social/Functional History  Lives With: Alone  Type of Home: Apartment  Home Layout: One level  Home Access: Level entry  Bathroom Shower/Tub: Tub/Shower unit  Bathroom Toilet: Standard  Bathroom Equipment: Grab bars around toilet  Bathroom Accessibility: Accessible  Receives Help From: Family  ADL Assistance: Independent  Homemaking Assistance: Independent  Homemaking Responsibilities: Yes  Ambulation Assistance: Independent  Transfer Assistance: Independent  Active : Yes  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   Short term goal 4: P will ambulate x 150' with RW and Celso while maintaining TTWB on RLE       Electronically signed by:    Rolanda Barron PT  10/13/2020, 5:45 PM

## 2020-10-13 NOTE — PROGRESS NOTES
Occupational Therapy    Occupational Therapy Treatment Note  Name: Surinder Villaseñor MRN: [id number] :   1955   Date:  10/13/2020   [facility] Date: 2020 [facility]   Restrictions/Precautions:  Restrictions/Precautions  Restrictions/Precautions: Weight Bearing, Fall Risk(spinal precautions)  Required Braces or Orthoses?: No   Communication with other providers:  Ok to see per nurse  Subjective:  Patient states: I have to go to the bathroom. My sugar was low so I am a little dizzy  Pain:   Location, Type, Intensity (0/10 to 10/10): 5/10  Objective:    Observation:  Pt was supine in bed   Objective Measures: Pt was seen for toilet transfers  Treatment, including education:  Pt was SBA for supine to sit on EOB. She sat up for a few minutes. She was CGA for sit to stand and pivot to [facility] adhering to toe touch wt bearing R leg. Pt wiped self independently and was CGA to transfer back to bed. S for sit to supine  Pt was left with call light. Nursing assistant came into the room  Assessment / Impression:        Patient's tolerance of treatment: fair  Adverse Reaction:no  Significant change in status and impact: had some dizziness  Barriers to improvement:  no  Plan for Next Session:    Adls, transfers  Time in: 845  Time out: 903  Timed treatment minutes: 18  Total treatment time: 18  Electronically signed by:    Julian Alford,   10/13/2020, 5:50 PM    Previously filed values:  Patient Goals   Patient goals : To return home  Short term goals  Time Frame for Short term goals: Until discharge  Short term goal 1: Pt will complete all LB ADLs mod I using AE PRN and min cues for increased functional independence  Short term goal 2: Pt will complete all functional transfers with SBA and min cues for increased functional independence.   Short term goal 3: Pt will complete all aspects of toileting mod I for increased functional independence  Short term goal 4: Pt will participate in therex/act with focus on UE strengthening and home management with min cues for increased independence with IADL tasks

## 2020-10-13 NOTE — PATIENT CARE CONFERENCE
Occupational Therapy  SWING BED WEEKLY TEAM SHEET     Sudha Cabello   10/13/2020   WEEK # 2    Occupational Therapy    Feeding  [x] Independ [] SBA [] MIN assist  [] mod assist  [] max assist [] tot assist  Grooming [x] Independ [] SBA [] MIN assist  [] mod assist  [] max assist [] tot assist   Bathing upper body [] Independ [x] SBA [] MIN assist  [] mod assist  [] max assist              [] tot assist    Dressing upper body [] Independ [x] SBA [] MIN assist  [] mod assist  [] max assist              [] tot assist    Dressing lower body [] Independ [] SBA [] MIN assist  [] mod assist  [] max assist              [] tot assist D for socks Toileting [] Independ [] SBA [] MIN assist  [] mod assist  [] max assist [] tot assist    Home Management:x    Cognitive/Perception: WFL    Upper extremity motor control: WFL    Other x  Goals of previous week:   [] 1st team   [] met   [x] partially met    [] not met             Why goals were not met requires assist for LB  adls       Issues to be resolved before discharge:  MI for transfers and adls  Occupational Therapy Signature: Kimmy Blevins, OT
SWING BED WEEKLY TEAM SHEET      Alon Linares   10/7/2020             WEEK# 1    PHYSICAL THERAPY       Ambulation: Distance:   79'   Device:  RW   Assist: CGA     Wt bearing: TTWB RLE         Stairs:  Not performed     Pain:  2/10   Transfers:   Sit to stand: CGA   Stand to sit:     CGA          Bed Mobility:  Supervision with leg  to assist RLE        Strength/ROM: reduced R knee flexion, reduced LE strength     Balance:     Static sitting    [] P  [] F-   [] F    [] F+    [x] G               Dynamic Sitting           [] P  [] F-   [] F    [] F+    [x] G                     Static standing            [] P  [x] F-   [] F    [] F+    [] G   []  With  [] Without device Dynamic standing       [] P  [x] F-   [] F    [] F+    [] G   []  With  [] Without device  (P= poor   F= fair   G= good)    Issues to be resolved before discharge strength, ROM, gait    Goals of previous week  [x] 1st team   [] met   [] partially met    [] not met                                          Why the goals were not met weakness, pain    Goals:   Short term goal 1: P will perform bed mobility with Celso  Short term goal 2: Pt will perform sit <> stand to RW with Celso  Short term goal 3: P will perform standing activity with 1 UE support with Celso x 3 min to return to premorbid activities.   Short term goal 4: P will ambulate x 150' with RW and Celso while maintaining TTWB on RLE       Physical Therapy Signature:  Rolanda Barron, PT
RLE  Short term goal 5: P will perform car transfer with supervision     Physical Therapy Signature:  Karina Amin PT

## 2020-10-13 NOTE — PROGRESS NOTES
Occupational Therapy    Occupational Therapy Treatment Note  Name: Christiano Rizvi MRN: [id number] :   1955   Date:  10/13/2020   Admission Date: 2020 Room:  [facility]/013-01   Restrictions/Precautions:  Restrictions/Precautions  Restrictions/Precautions: Weight Bearing, Fall Risk(spinal precautions)  Required Braces or Orthoses?: No     Communication with other providers:   Cleared for treatment by RN. Subjective:  Patient states:  \"I'm getting really tired\" during shower. Pain:   Location, Type, Intensity (0/10 to 10/10):  6/10 R LE    Objective:    Observation:  Pt alert and oriented. Treatment, including education:  Transfers  Supine to sit :Sup  Scooting :Sup  Sit to stand :SBA  Stand to sit :SBA  SPT:CGA  Shower:CGA      Self Care Training:   Cues were given for safety, sequence, UE/LE placement, visual cues, and balance. Activities performed today included dressing, toileting, hand hygiene, and grooming. Pt required extra time to pace self for energy conservation and pain management. Grooming  Mod I for brushing hair. Bathing   Min A for B feet. Pt stood using hand rails to maintain TTWB while washing buttocks and rubia area. UB Dress  Sup for pull over dress. LB Dress  Dep for socks. Therapeutic Activity Training:   Therapeutic activity training was instructed today. Cues were given for safety, sequence, UE/LE placement, awareness, and balance. Activities performed today included bed mobility training, sup-sit, sit-stand, SPT. Pt used RW for functional mobility x 50-75' x 2. Safety Measures: Gait belt used, Left in bed, Pull/Bed Alarm activated and call light left in reach, daughter present. Assessment / Impression:        Patient's tolerance of treatment: Good   Adverse Reaction: None  Significant change in status and impact:  None  Barriers to improvement:  Decreased strength and endurance. Plan for Next Session:    Continue with POC.     Time in: 0940  Time out:  1035  Timed treatment minutes:  55  Total treatment time:  54  Electronically signed by:    Constantine Trujillo, [address]    10/13/2020, 10:45 AM    Previously filed values:  Patient Goals   Patient goals : To return home  Short term goals  Time Frame for Short term goals: Until discharge  Short term goal 1: Pt will complete all LB ADLs mod I using AE PRN and min cues for increased functional independence  Short term goal 2: Pt will complete all functional transfers with SBA and min cues for increased functional independence.   Short term goal 3: Pt will complete all aspects of toileting mod I for increased functional independence  Short term goal 4: Pt will participate in therex/act with focus on UE strengthening and home management with min cues for increased independence with IADL tasks

## 2020-10-13 NOTE — CARE COORDINATION
[place] [facility] [place] to the patient's stay in the swing bed program.

## 2020-10-13 NOTE — PLAN OF CARE
Problem: Falls - Risk of:  Goal: Will remain free from falls  Description: Will remain free from falls  10/13/2020 0003 by Fern Zapata RN  Outcome: Ongoing  10/12/2020 1333 by Tarsha Can RN  Outcome: Met This Shift  Goal: Absence of physical injury  Description: Absence of physical injury  10/13/2020 0003 by Fern Zapata RN  Outcome: Ongoing  10/12/2020 1333 by Tarsha Can RN  Outcome: Met This Shift     Problem: SAFETY  Goal: LTG - Patient will demonstrate safety requirements appropriate to situation/environment  10/13/2020 0003 by Fern Zapata RN  Outcome: Ongoing  10/12/2020 1333 by Tarsha Can RN  Outcome: Met This Shift  Goal: LTG - patient will utilize safety techniques  10/13/2020 0003 by Fern Zapata RN  Outcome: Ongoing  10/12/2020 1333 by Tarsha Can RN  Outcome: Met This Shift  Goal: STG - patient locks brakes on wheelchair  10/13/2020 0003 by Fern Zapata RN  Outcome: Ongoing  10/12/2020 1333 by Tarsha Can RN  Outcome: Met This Shift  Goal: STG - Patient uses call light consistently to request assistance with transfers  10/13/2020 0003 by Fern Zapata RN  Outcome: Ongoing  10/12/2020 1333 by Tarsha Can RN  Outcome: Met This Shift  Goal: STG - patient uses gait belt during all transfers  10/13/2020 0003 by Fern Zapata RN  Outcome: Ongoing  10/12/2020 1333 by Tarsha Can RN  Outcome: Met This Shift     Problem: Pain:  Goal: Pain level will decrease  Description: Pain level will decrease  10/13/2020 0003 by Fern Zapata RN  Outcome: Ongoing  10/12/2020 1333 by Tarsha Can RN  Outcome: Met This Shift  Goal: Control of acute pain  Description: Control of acute pain  10/13/2020 0003 by Fern Zapata RN  Outcome: Ongoing  10/12/2020 1333 by Tarsha Can RN  Outcome: Met This Shift  Goal: Control of chronic pain  Description: Control of chronic pain  10/13/2020 0003 by Fern Zapata RN  Outcome: Ongoing  10/12/2020 1333 by Tarsha Can RN  Outcome: Met This Shift

## 2020-10-14 LAB
GLUCOSE BLD-MCNC: 117 MG/DL (ref 70–99)
GLUCOSE BLD-MCNC: 120 MG/DL (ref 70–99)
GLUCOSE BLD-MCNC: 144 MG/DL (ref 70–99)
GLUCOSE BLD-MCNC: 174 MG/DL (ref 70–99)

## 2020-10-14 PROCEDURE — 97110 THERAPEUTIC EXERCISES: CPT

## 2020-10-14 PROCEDURE — 6370000000 HC RX 637 (ALT 250 FOR IP): Performed by: INTERNAL MEDICINE

## 2020-10-14 PROCEDURE — 1200000002 HC SEMI PRIVATE SWING BED

## 2020-10-14 PROCEDURE — 6360000002 HC RX W HCPCS: Performed by: INTERNAL MEDICINE

## 2020-10-14 PROCEDURE — 82962 GLUCOSE BLOOD TEST: CPT

## 2020-10-14 PROCEDURE — 97530 THERAPEUTIC ACTIVITIES: CPT

## 2020-10-14 PROCEDURE — 97116 GAIT TRAINING THERAPY: CPT

## 2020-10-14 RX ADMIN — ACETAMINOPHEN 650 MG: 325 TABLET ORAL at 02:27

## 2020-10-14 RX ADMIN — INSULIN GLARGINE 20 UNITS: 100 INJECTION, SOLUTION SUBCUTANEOUS at 21:48

## 2020-10-14 RX ADMIN — LOSARTAN POTASSIUM 50 MG: 50 TABLET, FILM COATED ORAL at 09:34

## 2020-10-14 RX ADMIN — DICLOFENAC 2 G: 10 GEL TOPICAL at 21:48

## 2020-10-14 RX ADMIN — ACETAMINOPHEN 650 MG: 325 TABLET ORAL at 16:11

## 2020-10-14 RX ADMIN — ACETAMINOPHEN 650 MG: 325 TABLET ORAL at 06:49

## 2020-10-14 RX ADMIN — ENOXAPARIN SODIUM 40 MG: 40 INJECTION SUBCUTANEOUS at 09:34

## 2020-10-14 RX ADMIN — LOSARTAN POTASSIUM 50 MG: 50 TABLET, FILM COATED ORAL at 21:48

## 2020-10-14 RX ADMIN — OXYCODONE AND ACETAMINOPHEN 1 TABLET: 5; 325 TABLET ORAL at 09:34

## 2020-10-14 RX ADMIN — CYCLOBENZAPRINE 10 MG: 10 TABLET, FILM COATED ORAL at 14:02

## 2020-10-14 RX ADMIN — GLIPIZIDE 10 MG: 10 TABLET, FILM COATED, EXTENDED RELEASE ORAL at 09:34

## 2020-10-14 RX ADMIN — FAMOTIDINE 20 MG: 20 TABLET ORAL at 21:47

## 2020-10-14 RX ADMIN — OXYCODONE AND ACETAMINOPHEN 1 TABLET: 5; 325 TABLET ORAL at 21:47

## 2020-10-14 RX ADMIN — FAMOTIDINE 20 MG: 20 TABLET ORAL at 09:34

## 2020-10-14 RX ADMIN — CYCLOBENZAPRINE 10 MG: 10 TABLET, FILM COATED ORAL at 21:48

## 2020-10-14 RX ADMIN — INSULIN LISPRO 2 UNITS: 100 INJECTION, SOLUTION INTRAVENOUS; SUBCUTANEOUS at 12:46

## 2020-10-14 RX ADMIN — DOCUSATE SODIUM 50 MG AND SENNOSIDES 8.6 MG 1 TABLET: 8.6; 5 TABLET, FILM COATED ORAL at 21:48

## 2020-10-14 RX ADMIN — INSULIN LISPRO 2 UNITS: 100 INJECTION, SOLUTION INTRAVENOUS; SUBCUTANEOUS at 16:42

## 2020-10-14 RX ADMIN — CYCLOBENZAPRINE 10 MG: 10 TABLET, FILM COATED ORAL at 09:33

## 2020-10-14 RX ADMIN — DOCUSATE SODIUM 50 MG AND SENNOSIDES 8.6 MG 1 TABLET: 8.6; 5 TABLET, FILM COATED ORAL at 09:33

## 2020-10-14 RX ADMIN — DICLOFENAC 2 G: 10 GEL TOPICAL at 09:35

## 2020-10-14 ASSESSMENT — PAIN SCALES - GENERAL
PAINLEVEL_OUTOF10: 3
PAINLEVEL_OUTOF10: 3
PAINLEVEL_OUTOF10: 2
PAINLEVEL_OUTOF10: 4
PAINLEVEL_OUTOF10: 4
PAINLEVEL_OUTOF10: 5
PAINLEVEL_OUTOF10: 2

## 2020-10-14 ASSESSMENT — PAIN DESCRIPTION - PAIN TYPE: TYPE: SURGICAL PAIN

## 2020-10-14 ASSESSMENT — PAIN DESCRIPTION - LOCATION: LOCATION: LEG

## 2020-10-14 ASSESSMENT — PAIN DESCRIPTION - ORIENTATION: ORIENTATION: RIGHT

## 2020-10-14 NOTE — CARE COORDINATION
Mamadou Burciaga was evaluated today and a DME order was entered for a wheeled walker because she requires this to successfully complete daily living tasks of eating, bathing, toileting, personal cares, ambulating, grooming, hygiene, dressing upper body, dressing lower body, meal preparation and taking own medications. A wheeled walker is necessary due to the patient's unsteady gait, upper body weakness, and inability to  an ambulation device; and she can ambulate only by pushing a walker instead of a lesser assistive device such as a cane, crutch, or standard walker. The need for this equipment was discussed with the patient and she understands and is in agreement.

## 2020-10-14 NOTE — CARE COORDINATION
SWING BED WEEKLY TEAM SHEET     Lolita Yael   10/14/2020 WEEK # 3    Care Management    Issues to be resolved before discharge: Increased strength, balance, and mobility    Family Education: Patient/staff to update     Discharge Plan: Home with [facility]   Patient/Family Adjustment: Patient will need a BSC and wheeled walker      Goals of previous week:   [] 1st team   [] met   [] partially met    [x] not met             Why goals were not met: Continued weakness    Skilled Level of Care Remains   [x] yes   [] no    Estimated length of stay: Anticipated discharge Friday 10/16/2020  Patient/Family Concerns/input: Patient feels she will be ready to return home Friday     Patient/Family  Signature _________________  10/14/2020       Care Management Signature:  Vera Aguila RN

## 2020-10-14 NOTE — CARE COORDINATION
Leydi Williamson requires a bedside commode with a wide seat due to being confined to one level of the home, and is physically incapable of utilizing regular toilet facilities. Current body weight is Weight: 231 lb 9.6 oz (105.1 kg).

## 2020-10-14 NOTE — CARE COORDINATION
CM faxed the DME orders for a bedside commode and wheeled walker to [facility] to initiate the referral.      3:07 PM  CM notified by [phone] that the [facility] and walker will be delivered to the patient's room Friday morning (10/16).

## 2020-10-14 NOTE — PROGRESS NOTES
High  Patient's risk as above due to continued pain management, diabetes management hypertension     History of Present Illness:     Pt S&E. No acute events overnight. Patient resting company. Denies any headache blurred vision dizziness. Denies any chest pain palpitations or shortness of breath. Denies any fever chills nausea or vomiting. Right lower extremity with improved pain. However, patient continues to request narcotic. Did discuss the habitual use and a tendency towards addiction. Certainly, not discounting her pain. But attempting to use more Tylenol and decreasing frequency of narcotic use. 10-14 point ROS reviewed negative, unless as noted above    Objective: Intake/Output Summary (Last 24 hours) at 10/14/2020 1605  Last data filed at 10/14/2020 1337  Gross per 24 hour   Intake 600 ml   Output --   Net 600 ml      Vitals:   Vitals:    10/14/20 0751   BP: (!) 169/77   Pulse: 89   Resp: 16   Temp: 96.7 °F (35.9 °C)   SpO2:      Physical Exam:    GEN Awake female, sitting upright in bed in no apparent distress. Appears given age. EYES Pupils are equally round. No scleral erythema, discharge, or conjunctivitis. HENT Mucous membranes are moist.   NECK No apparent thyromegaly or masses. RESP Clear to auscultation, no wheezes, rales or rhonchi. Symmetric chest movement while on room air. CARDIO/VASC S1/S2 auscultated. Regular rate without appreciable murmurs, rubs, or gallops. Peripheral pulses equal bilaterally and palpable. No peripheral edema. GI Abdomen is soft without significant tenderness, masses, or guarding. Bowel sounds are normoactive. Rectal exam deferred.  Dean catheter is not present. HEME/LYMPH No petechiae or ecchymoses. MSK No gross joint deformities. Spontaneous movement of all extremities but decreased in right lower extremity  SKIN Normal coloration, warm, dry.   Surgical incision healing nicely without erythema or drainage  NEURO Cranial nerves appear grossly intact, normal speech, no lateralizing weakness. PSYCH Awake, alert, oriented x 4. Affect appropriate.     Medications:   Medications:    HYDROcodone 5 mg - acetaminophen  1 tablet Oral Q8H    acetaminophen  650 mg Oral Q6H    enoxaparin  40 mg Subcutaneous Daily    sennosides-docusate sodium  1 tablet Oral BID    famotidine  20 mg Oral BID    cyclobenzaprine  10 mg Oral TID    diclofenac sodium  2 g Topical BID    glipiZIDE  10 mg Oral Daily with breakfast    insulin glargine  20 Units Subcutaneous Nightly    insulin lispro  0-12 Units Subcutaneous TID WC    insulin lispro  0-6 Units Subcutaneous Nightly    lidocaine  1 patch Transdermal Daily    losartan  50 mg Oral BID    polyethylene glycol  17 g Oral BID      Infusions:    dextrose       PRN Meds: acetaminophen, 325 mg, Q6H PRN  oxyCODONE-acetaminophen, 1 tablet, Q6H PRN  magnesium hydroxide, 30 mL, Daily PRN  sodium chloride flush, 10 mL, PRN  dextrose, 1,000 mL, PRN  dextrose, 12.5 g, PRN  glucagon (rDNA), 1 mg, PRN  glucose, 15 g, PRN  magnesium sulfate, 2 g, PRN  potassium chloride, 40 mEq, PRN    Or  potassium alternative oral replacement, 40 mEq, PRN    Or  potassium chloride, 10 mEq, PRN  promethazine, 12.5 mg, Q6H PRN    Or  ondansetron, 4 mg, Q6H PRN          Electronically signed by Danie Christianson MD on 10/14/2020 at 4:05 PM

## 2020-10-14 NOTE — PROGRESS NOTES
12:18 PM    Previously filed values:  Patient Goals   Patient goals : To return home  Short term goals  Time Frame for Short term goals: Until discharge  Short term goal 1: Pt will complete all LB ADLs mod I using AE PRN and min cues for increased functional independence  Short term goal 2: Pt will complete all functional transfers with SBA and min cues for increased functional independence.   Short term goal 3: Pt will complete all aspects of toileting mod I for increased functional independence  Short term goal 4: Pt will participate in therex/act with focus on UE strengthening and home management with min cues for increased independence with IADL tasks

## 2020-10-15 LAB
GLUCOSE BLD-MCNC: 105 MG/DL (ref 70–99)
GLUCOSE BLD-MCNC: 159 MG/DL (ref 70–99)
GLUCOSE BLD-MCNC: 189 MG/DL (ref 70–99)
GLUCOSE BLD-MCNC: 96 MG/DL (ref 70–99)

## 2020-10-15 PROCEDURE — 6370000000 HC RX 637 (ALT 250 FOR IP): Performed by: INTERNAL MEDICINE

## 2020-10-15 PROCEDURE — 97110 THERAPEUTIC EXERCISES: CPT

## 2020-10-15 PROCEDURE — 82962 GLUCOSE BLOOD TEST: CPT

## 2020-10-15 PROCEDURE — 97530 THERAPEUTIC ACTIVITIES: CPT

## 2020-10-15 PROCEDURE — 94761 N-INVAS EAR/PLS OXIMETRY MLT: CPT

## 2020-10-15 PROCEDURE — 1200000002 HC SEMI PRIVATE SWING BED

## 2020-10-15 PROCEDURE — 6360000002 HC RX W HCPCS: Performed by: INTERNAL MEDICINE

## 2020-10-15 RX ADMIN — FAMOTIDINE 20 MG: 20 TABLET ORAL at 09:10

## 2020-10-15 RX ADMIN — DOCUSATE SODIUM 50 MG AND SENNOSIDES 8.6 MG 1 TABLET: 8.6; 5 TABLET, FILM COATED ORAL at 09:10

## 2020-10-15 RX ADMIN — LOSARTAN POTASSIUM 50 MG: 50 TABLET, FILM COATED ORAL at 09:10

## 2020-10-15 RX ADMIN — ACETAMINOPHEN 650 MG: 325 TABLET ORAL at 11:23

## 2020-10-15 RX ADMIN — CYCLOBENZAPRINE 10 MG: 10 TABLET, FILM COATED ORAL at 13:52

## 2020-10-15 RX ADMIN — GLIPIZIDE 10 MG: 10 TABLET, FILM COATED, EXTENDED RELEASE ORAL at 09:10

## 2020-10-15 RX ADMIN — ACETAMINOPHEN 650 MG: 325 TABLET ORAL at 17:31

## 2020-10-15 RX ADMIN — CYCLOBENZAPRINE 10 MG: 10 TABLET, FILM COATED ORAL at 09:10

## 2020-10-15 RX ADMIN — CYCLOBENZAPRINE 10 MG: 10 TABLET, FILM COATED ORAL at 20:50

## 2020-10-15 RX ADMIN — INSULIN GLARGINE 20 UNITS: 100 INJECTION, SOLUTION SUBCUTANEOUS at 20:49

## 2020-10-15 RX ADMIN — INSULIN LISPRO 2 UNITS: 100 INJECTION, SOLUTION INTRAVENOUS; SUBCUTANEOUS at 12:47

## 2020-10-15 RX ADMIN — OXYCODONE AND ACETAMINOPHEN 1 TABLET: 5; 325 TABLET ORAL at 04:31

## 2020-10-15 RX ADMIN — LOSARTAN POTASSIUM 50 MG: 50 TABLET, FILM COATED ORAL at 20:50

## 2020-10-15 RX ADMIN — DICLOFENAC 2 G: 10 GEL TOPICAL at 09:11

## 2020-10-15 RX ADMIN — ENOXAPARIN SODIUM 40 MG: 40 INJECTION SUBCUTANEOUS at 09:10

## 2020-10-15 RX ADMIN — OXYCODONE AND ACETAMINOPHEN 1 TABLET: 5; 325 TABLET ORAL at 20:50

## 2020-10-15 RX ADMIN — FAMOTIDINE 20 MG: 20 TABLET ORAL at 20:50

## 2020-10-15 RX ADMIN — DOCUSATE SODIUM 50 MG AND SENNOSIDES 8.6 MG 1 TABLET: 8.6; 5 TABLET, FILM COATED ORAL at 20:50

## 2020-10-15 ASSESSMENT — PAIN DESCRIPTION - ONSET
ONSET: PROGRESSIVE

## 2020-10-15 ASSESSMENT — PAIN DESCRIPTION - PROGRESSION
CLINICAL_PROGRESSION: GRADUALLY IMPROVING
CLINICAL_PROGRESSION: GRADUALLY WORSENING
CLINICAL_PROGRESSION: GRADUALLY IMPROVING

## 2020-10-15 ASSESSMENT — PAIN DESCRIPTION - ORIENTATION
ORIENTATION: RIGHT

## 2020-10-15 ASSESSMENT — PAIN DESCRIPTION - PAIN TYPE
TYPE: SURGICAL PAIN

## 2020-10-15 ASSESSMENT — PAIN SCALES - GENERAL
PAINLEVEL_OUTOF10: 2
PAINLEVEL_OUTOF10: 3
PAINLEVEL_OUTOF10: 6
PAINLEVEL_OUTOF10: 2
PAINLEVEL_OUTOF10: 4

## 2020-10-15 ASSESSMENT — PAIN DESCRIPTION - LOCATION
LOCATION: HIP;BACK
LOCATION: BACK;HIP
LOCATION: BACK;HIP

## 2020-10-15 ASSESSMENT — PAIN DESCRIPTION - FREQUENCY
FREQUENCY: CONTINUOUS

## 2020-10-15 ASSESSMENT — PAIN DESCRIPTION - DESCRIPTORS
DESCRIPTORS: ACHING

## 2020-10-15 NOTE — PROGRESS NOTES
Occupational Therapy    Occupational Therapy Treatment Note  Name: Andrew Conn MRN: [id number] :   1955   Date:  10/15/2020   Admission Date: 2020 Room:  013/013-01   Restrictions/Precautions:  Restrictions/Precautions  Restrictions/Precautions: Weight Bearing, Fall Risk(spinal precautions)  Required Braces or Orthoses?: No  Communication with other providers:   Cleared for treatment by RN. Subjective:  Patient states:  \"I'm going home sometime tomorrow\"  Pain:   Location, Type, Intensity (0/10 to 10/10):  7/10    Objective:    Observation:  Pt alert and oriented. Treatment, including education:      Therapeutic Exercise:  Cues were given for technique, safety, recruitment, and rationale. Cues were verbal and/or tactile. For BUE strengthening for ADL & functional mobility Indep pt performed BUE strengthening HEP c 1 and 2# hand weights x 20 reps x 6 exercises with Minimal difficulty. For BUE strengthening for ADL & functional mobility Indep pt performed BUE strengthening HEP using Medium strength theraband  X 7 exer for R/L UE x 20 reps c minimal rest breaks. Therapeutic Activity Training:   Therapeutic activity training was instructed today. Cues were given for safety, sequence, UE/LE placement, awareness, and balance. Activities performed today included bed mobility training, sup-sit, sit-stand, SPT. Safety Measures: Gait belt used, Left in bed, Pull/Bed Alarm activated and call light left in reach          Assessment / Impression:        Patient's tolerance of treatment: Good   Adverse Reaction: None  Significant change in status and impact:  None  Barriers to improvement:  Pain, dec strength and endurance    Plan for Next Session:    Continue with POC.     Time in:  1415  Time out:  1500  Timed treatment minutes:  45  Total treatment time:  45  Electronically signed by:    Javon Livingston [address]    10/15/2020, 3:14 PM    Previously filed values:  Patient Goals Patient goals : To return home  Short term goals  Time Frame for Short term goals: Until discharge  Short term goal 1: Pt will complete all LB ADLs mod I using AE PRN and min cues for increased functional independence  Short term goal 2: Pt will complete all functional transfers with SBA and min cues for increased functional independence.   Short term goal 3: Pt will complete all aspects of toileting mod I for increased functional independence  Short term goal 4: Pt will participate in therex/act with focus on UE strengthening and home management with min cues for increased independence with IADL tasks

## 2020-10-15 NOTE — PROGRESS NOTES
Occupational Therapy    Occupational Therapy Treatment Note  Name: Sudha Cabello MRN: [id number] :   1955   Date:  10/15/2020   Admission Date: 2020 Room:  013/013-01   Restrictions/Precautions:  Restrictions/Precautions  Restrictions/Precautions: Weight Bearing, Fall Risk(spinal precautions)  Required Braces or Orthoses?: No   Communication with other providers: ok to see per nurse  Subjective:  Patient states: Pt needed to get from bathroom back to bed  Pain:   Location, Type, Intensity (0/10 to 10/10): 3/10  Objective:    Observation: Pt was on toilet  Objective Measures: Toilet transfer and bed mobility  Treatment, including education:  Pt was S for sit to stand from toilet. I for wiping. Pt was S for walking back to bed. Pt was S for stand to sit and then S for sit to supine and pulling self up to [address] / Impression:        Patient's tolerance of treatment:  good  Adverse Reaction: no  Significant change in status and impact:  improving  Barriers to improvement:decreased endurance and pain  Plan for Next Session:    adls or transfers  Time in: 1455  Time out: 1510  Timed treatment minutes: 15  Total treatment time: 15  Electronically signed by:    Kimmy Blevins,   10/15/2020, 5:46 PM    Previously filed values:  Patient Goals   Patient goals : To return home  Short term goals  Time Frame for Short term goals: Until discharge  Short term goal 1: Pt will complete all LB ADLs mod I using AE PRN and min cues for increased functional independence  Short term goal 2: Pt will complete all functional transfers with SBA and min cues for increased functional independence.   Short term goal 3: Pt will complete all aspects of toileting mod I for increased functional independence  Short term goal 4: Pt will participate in therex/act with focus on UE strengthening and home management with min cues for increased independence with IADL tasks

## 2020-10-15 NOTE — CARE COORDINATION
[facility] to initiate the referral.

## 2020-10-15 NOTE — FLOWSHEET NOTE
Physical Therapy DailyTreatment Note   Date: 10/15/2020 Room: [unfilled]   Name: Maddie Ocampo : 1955   MRN: [id number]   Admission Date:2020        Rehabilitation Diagnosis: Debility [R53.81]  Debility [R53.81]    Objective                                                                                    Goals:  (Update in navigator)  Short term goals  Time Frame for Short term goals: 3 weeks  Short term goal 1: P will perform bed mobility with Celso  Short term goal 2: Pt will perform sit <> stand to RW with Celso  Short term goal 3: P will perform standing activity with 1 UE support with Celso x 3 min to return to premorbid activities. Short term goal 4: P will ambulate x 150' with RW and Celso while maintaining TTWB on RLE  Short term goal 5: P will perform car transfer with supervision:   :      Plan of Care                                                                              Times per week: 6+ days per week for a minimum of 15 minutes/day  Treatment to include Current Treatment Recommendations: Strengthening, ROM, Neuromuscular Re-education, Safety Education & Training, Balance Training, Endurance Training, Functional Mobility Training, Equipment Evaluation, Education, & procurement, Transfer Training, Gait Training, Stair training, Positioning, Pain Management    Date  10/15/2020   TIMES IN/OUT   3:10 - 3:35 pm   Restrictions/Precautions Restrictions/Precautions: Toe Touch Weight Bearing R LE, Fall Risk(spinal precautions)         Current Diet/Swallowing Issues DIET CARB CONTROL; Carb Control: 4 carb choices (60 gms)/meal  Dietary Nutrition Supplements: Diabetic Oral Supplement   Communication with other providers: [x] Ok to see per nursing   [] Medical hold and reason  [] Spoke with (team member) regarding   Subjective observations: Pt presented with OT and reports she just got in bed from using the restroom and is feeling sweaty and does not want to get up again.  Pt reports she just worked with BROWN. She reports she can only do bed exercises. []   Gait belt used during tx session   Pain level/location: Pre-tx 2-3/10   Post-tx 2-3/10   Bed Mobility   Pt performed weight shifting in bed. Transfers Sit to stand not performed  Stand to sit not performed  Commode not performed   Standing Tolerance not performed   Amb/Locomotion: AD/Distance/Assist not performed   Steps (#)/Assist/Rails/AD Not performed   Ramp:AD/Assist Not performed   Curb:height AD/Assist Not performed   Strategies that improved performance: Pt required short rest breaks. Barriers to progress/participation: none   Alarm placed/where? Fall Risk  [x] left in bed  [ ] left in chair [x] call light within reach  [ ] bed alarm on  [ ] personal alarm on [ ] [de-identified]  [ ] other staff present:   Discharge recommendations  Anticipated discharge date:  10/16/2020  Destination: []home alone   [x]home alone with assist prn  []Continuous supervision  []SNF  [] Assisted living   Continued therapy: [x]HHC PT  []OUTPATIENT  PT   [x]  Further PT  Equipment needs: See eval     Therapeutic activities/exercises completed this date: Pt demonstrated IR in bed with genuvalgum at R knee. Modality/intervention used:   [x] Therapeutic Exercise    [ ] Modalities:   [x] Therapeutic Activity    [ ] Ultrasound   [ ] Elec Stim   [x] Gait Training     [ ] Cervical Traction  [ ] Lumbar Traction   [ ] Neuromuscular Re-education   [ ] Cold/hotpack  [ ] Iontophoresis   [x] Instruction in HEP     [ ] Vasopneumatic   [ ] Manual Therapy   [ ] Aquatic Therapy     Patient/Caregiver Education and Training: Pt educated to continue to perform exercises on her own. Pt educated on car transfers and given a demonstration and walked through the transfer.  Pt educated to have her daughter A by holding the car door steady and scooting the seat all the way back first.    Exercise/Equipment/Modalities this date   Ankle pumps: 1x10 B LE DF/PF    glute squeezes: 1x10 with 5\" hold Quad set: 1x10 with 5\" hold B LE   Heel slides: 1x15 R LE   SAQ: 1x10 with 5\" hold B LE   Hip abd: 1x10 R LE     Treatment Plan for Next Session: car transfer    Assessment / Impression                                                          Treatment/Activity Tolerance:   [x] Tolerated Treatment well:     [] Patient limited by fatigue/pain:       [] Patient limited by medical complications:    [] Adverse Reaction to Tx:   [] Significant change in status    Plan:   [x] Continue per plan of care [ ] Ruma Del Toro current plan   [ ] Plan of care initiated [ ] Hold pending MD visit [ ] Discharged    Billing:  Billed units: 2 therapeutic exercise      Signed: Ashley Bailon PT DPT [id number]  10/15/2020, 3:20 PM

## 2020-10-16 VITALS
HEART RATE: 86 BPM | SYSTOLIC BLOOD PRESSURE: 164 MMHG | WEIGHT: 231.6 LBS | BODY MASS INDEX: 35.1 KG/M2 | DIASTOLIC BLOOD PRESSURE: 72 MMHG | RESPIRATION RATE: 16 BRPM | TEMPERATURE: 96 F | OXYGEN SATURATION: 94 % | HEIGHT: 68 IN

## 2020-10-16 PROBLEM — R53.81 DEBILITY: Status: RESOLVED | Noted: 2020-09-29 | Resolved: 2020-10-16

## 2020-10-16 LAB
CULTURE: ABNORMAL
CULTURE: ABNORMAL
GLUCOSE BLD-MCNC: 130 MG/DL (ref 70–99)
Lab: ABNORMAL
SPECIMEN: ABNORMAL

## 2020-10-16 PROCEDURE — 6370000000 HC RX 637 (ALT 250 FOR IP): Performed by: INTERNAL MEDICINE

## 2020-10-16 PROCEDURE — 82962 GLUCOSE BLOOD TEST: CPT

## 2020-10-16 PROCEDURE — 6370000000 HC RX 637 (ALT 250 FOR IP): Performed by: NURSE PRACTITIONER

## 2020-10-16 PROCEDURE — 97530 THERAPEUTIC ACTIVITIES: CPT

## 2020-10-16 PROCEDURE — 97535 SELF CARE MNGMENT TRAINING: CPT

## 2020-10-16 PROCEDURE — 6360000002 HC RX W HCPCS: Performed by: INTERNAL MEDICINE

## 2020-10-16 RX ORDER — HYDROCODONE BITARTRATE AND ACETAMINOPHEN 5; 325 MG/1; MG/1
1 TABLET ORAL EVERY 8 HOURS
Qty: 9 TABLET | Refills: 0 | Status: SHIPPED | OUTPATIENT
Start: 2020-10-16 | End: 2020-10-19

## 2020-10-16 RX ORDER — FAMOTIDINE 20 MG/1
20 TABLET, FILM COATED ORAL 2 TIMES DAILY
Qty: 60 TABLET | Refills: 3 | Status: SHIPPED | OUTPATIENT
Start: 2020-10-16

## 2020-10-16 RX ORDER — OXYCODONE HYDROCHLORIDE AND ACETAMINOPHEN 5; 325 MG/1; MG/1
1 TABLET ORAL EVERY 6 HOURS PRN
Qty: 12 TABLET | Refills: 0 | Status: SHIPPED | OUTPATIENT
Start: 2020-10-16 | End: 2020-10-20

## 2020-10-16 RX ORDER — LIDOCAINE 4 G/G
1 PATCH TOPICAL DAILY
Qty: 10 PATCH | Refills: 0 | Status: SHIPPED | OUTPATIENT
Start: 2020-10-16

## 2020-10-16 RX ORDER — CYCLOBENZAPRINE HCL 10 MG
10 TABLET ORAL 3 TIMES DAILY
Qty: 30 TABLET | Refills: 0 | Status: SHIPPED | OUTPATIENT
Start: 2020-10-16 | End: 2020-10-26

## 2020-10-16 RX ADMIN — ACETAMINOPHEN 650 MG: 325 TABLET ORAL at 08:58

## 2020-10-16 RX ADMIN — OXYCODONE AND ACETAMINOPHEN 1 TABLET: 5; 325 TABLET ORAL at 04:21

## 2020-10-16 RX ADMIN — FAMOTIDINE 20 MG: 20 TABLET ORAL at 08:53

## 2020-10-16 RX ADMIN — CYCLOBENZAPRINE 10 MG: 10 TABLET, FILM COATED ORAL at 08:53

## 2020-10-16 RX ADMIN — LOSARTAN POTASSIUM 50 MG: 50 TABLET, FILM COATED ORAL at 08:53

## 2020-10-16 RX ADMIN — GLIPIZIDE 10 MG: 10 TABLET, FILM COATED, EXTENDED RELEASE ORAL at 08:53

## 2020-10-16 RX ADMIN — DOCUSATE SODIUM 50 MG AND SENNOSIDES 8.6 MG 1 TABLET: 8.6; 5 TABLET, FILM COATED ORAL at 08:53

## 2020-10-16 RX ADMIN — ENOXAPARIN SODIUM 40 MG: 40 INJECTION SUBCUTANEOUS at 08:53

## 2020-10-16 ASSESSMENT — PAIN SCALES - GENERAL
PAINLEVEL_OUTOF10: 4
PAINLEVEL_OUTOF10: 2
PAINLEVEL_OUTOF10: 2
PAINLEVEL_OUTOF10: 0

## 2020-10-16 ASSESSMENT — PAIN DESCRIPTION - LOCATION: LOCATION: BACK;HIP

## 2020-10-16 ASSESSMENT — PAIN - FUNCTIONAL ASSESSMENT: PAIN_FUNCTIONAL_ASSESSMENT: ACTIVITIES ARE NOT PREVENTED

## 2020-10-16 ASSESSMENT — PAIN DESCRIPTION - DESCRIPTORS: DESCRIPTORS: ACHING;CONSTANT

## 2020-10-16 ASSESSMENT — PAIN DESCRIPTION - PAIN TYPE: TYPE: SURGICAL PAIN

## 2020-10-16 ASSESSMENT — PAIN DESCRIPTION - FREQUENCY: FREQUENCY: CONTINUOUS

## 2020-10-16 ASSESSMENT — PAIN DESCRIPTION - ORIENTATION: ORIENTATION: RIGHT

## 2020-10-16 ASSESSMENT — PAIN DESCRIPTION - PROGRESSION: CLINICAL_PROGRESSION: GRADUALLY WORSENING

## 2020-10-16 ASSESSMENT — PAIN DESCRIPTION - ONSET: ONSET: ON-GOING

## 2020-10-16 NOTE — DISCHARGE INSTR - COC
Continuity of Care Form    Patient Name: Gosia Kang   :  1955  MRN:  [id number]    Admit date:  2020  Discharge date:  ***    Code Status Order: Full Code   Advance Directives:   Advance Care Flowsheet Documentation     Date/Time Healthcare Directive Type of Healthcare Directive Copy in [address] Agent's Name Healthcare Agent's Phone Number    20 1532  No, patient does not have an advance directive for healthcare treatment -- -- -- -- --          Admitting Physician:  Preston Ayers MD  PCP: Janet Hernandez DO    Discharging Nurse: [facility] Unit/Room#: 013/013-01  Discharging Unit Phone Number: ***    Emergency Contact:   Extended Emergency Contact Information  Primary Emergency Contact: [facility]  Mobile Phone: [phone]  Relation: Child    Past Surgical History:  Past Surgical History:   Procedure Laterality Date    [address] Right 2020    RIGHT FEMUR IM NAIL AMAYA INSERTION performed by Kena Dumas MD at [address] [facility]         Immunization History: There is no immunization history on file for this patient.     Active Problems:  Patient Active Problem List   Diagnosis Code    Cl[facility]in[facility] S72.351A       Isolation/Infection:   Isolation          No Isolation        Patient Infection Status     Infection Onset Added Last Indicated Last Indicated By Review Planned Expiration Resolved Resolved By    None active    Resolved    COVID-19 Rule Out 20 COVID-19 (Ordered)   20 Rule-Out Test Resulted          Nurse Assessment:  Last Vital Signs: BP (!) 164/72   Pulse 86   Temp 96 °F (35.6 °C)   Resp 16   Ht 5' 8\" (1.727 m)   Wt 231 lb 9.6 oz (105.1 kg)   SpO2 94%   Breastfeeding No   BMI 35.21 kg/m²     Last documented pain score (0-10 scale): Pain Level: 2  Last Weight:   Wt Readings from Last 1 Encounters:   20 231 lb 9.6 oz (105.1 kg)     Mental Status:  {IP PT MENTAL STATUS:}    IV Access:  [address] IV ACCESS:308799591}    Nursing Mobility/ADLs:  Walking   {CHP DME YXGW:350310239}  Transfer  {CHP DME DGRO:726995510}  Bathing  {CHP DME MXOH:691380492}  Dressing  {CHP DME BHCY:938509875}  Toileting  {CHP DME CZX}  Feeding  {CHP DME TMJR:716134551}  Med Admin  {CHP DME UAKT:624936823}  Med Delivery   { DARIAN MED Delivery:264799126}    Wound Care Documentation and Therapy:        Elimination:  Continence:   · Bowel: {YES / JU:15912}  · Bladder: {YES / DR:55666}  Urinary Catheter: {Urinary Catheter:184932134}   Colostomy/Ileostomy/Ileal Conduit: {YES / CT:00368}       Date of Last BM: ***    Intake/Output Summary (Last 24 hours) at 10/16/2020 0955  Last data filed at 10/16/2020 0930  Gross per 24 hour   Intake 720 ml   Output --   Net 720 ml     I/O last 3 completed shifts:   In: 18 [P.O.:480]  Out: -     Safety Concerns:     [address] Safety Concerns:137715388}    Impairments/Disabilities:      [address] Impairments/Disabilities:085177345}    Nutrition Therapy:  Current Nutrition Therapy:   [address] Diet List:592951833}    Routes of Feeding: {P DME Other Feedings:230064185}  Liquids: {Slp liquid thickness:24119}  Daily Fluid Restriction: {CHP DME Yes amt example:126042215}  Last Modified Barium Swallow with Video (Video Swallowing Test): {Done Not Done CXZJ:605675575}    Treatments at the Time of Hospital Discharge:   Respiratory Treatments: ***  Oxygen Therapy:  {Therapy; copd oxygen:93266}  Ventilator:    { CC Vent IUBY:311850426}    Rehab Therapies: {THERAPEUTIC INTERVENTION:4087071586}  Weight Bearing Status/Restrictions: [address] [employer] [employer] Weight Bearin}  Other Medical Equipment (for information only, NOT a DME order):  {EQUIPMENT:914970653}  Other Treatments: ***    Patient's personal belongings (please select all that are sent with patient):  {[facility] DME Belongings:587814337}    RN SIGNATURE:  {Esignature:549563744}    CASE MANAGEMENT/SOCIAL WORK SECTION    Inpatient Status Date: ***    Readmission Risk Assessment Score:  Readmission Risk              Risk of Unplanned Readmission:        16           Discharging to Facility/ Agency   · Name:   · Address:  · Phone:  · Fax:    Dialysis Facility (if applicable)   · Name:  · Address:  · Dialysis Schedule:  · Phone:  · Fax:    / signature: {Esignature:957626640}    PHYSICIAN SECTION    Prognosis: {Prognosis:6470361687}    Condition at Discharge: [address] [place] [address] Patient Condition:940097964}    Rehab Potential (if transferring to Rehab): {Prognosis:9487692196}    Recommended Labs or Other Treatments After Discharge: ***    Physician Certification: I certify the above information and transfer of John Kerr  is necessary for the continuing treatment of the diagnosis listed and that she requires {Admit to Appropriate Level of Care:45041} for {GREATER/LESS:068235640} 30 days.      Update Admission H&P: {CHP DME Changes in GNJ:875168840}    PHYSICIAN SIGNATURE:  {Esignature:020297128}

## 2020-10-16 NOTE — PROGRESS NOTES
Occupational Therapy    Occupational Therapy Treatment Note  Name: Maddie Ocampo MRN: [id number] :   1955   Date:  10/16/2020   Admission Date: 2020 Room:  013/013-01   Restrictions/Precautions:  Restrictions/Precautions  Restrictions/Precautions: Weight Bearing, Fall Risk(spinal precautions)  Required Braces or Orthoses?: No     Communication with other providers:   Cleared for treatment by RN. Subjective:  Patient states:  \"I'm very nervous\"  Pain:   Location, Type, Intensity (0/10 to 10/10):  6/10 R LE    Objective:    Observation:  Pt alert and oriented. Treatment, including education:  Transfers  Supine to sit :Mod I  Sit to supine : Mod I  Scooting : Mod I  Sit to stand :SUp  Stand to sit :SUp  SPT:SUp  Toilet:Sup    Self Care Training:   Cues were given for safety, sequence, UE/LE placement, visual cues, and balance. Activities performed today included dressing, toileting, hand hygiene, and grooming. Daughter present during ADLs and discussed safety techs with bathing and dressing. Toileting  Mod I for clothing management and toilet hygiene. Grooming  Mod I for brush teeth and hair. Bathing   CGA for steady while standing to wash buttocks and rubia area    UB Dress  Mod I to don shirt    LB Dress  Mod I to don pants, underwear and socks using sock aid        Therapeutic Activity Training:   Therapeutic activity training was instructed today. Cues were given for safety, sequence, UE/LE placement, awareness, and balance. Activities performed today included bed mobility training, sup-sit, sit-stand, SPT. Safety Measures: Gait belt used, Left in bed, Pull/Bed Alarm activated and call light left in reach          Assessment / Impression:        Patient's tolerance of treatment: Good   Adverse Reaction: None  Significant change in status and impact:  None  Barriers to improvement:  Dec strength and endurance. Plan for Next Session:    Continue with POC.       Time in: 0900  Time out:  1000  Timed treatment minutes:  60  Total treatment time:  60  Electronically signed by:    Javon Livingston, [address]    10/16/2020, 10:09 AM    Previously filed values:  Patient Goals   Patient goals : To return home  Short term goals  Time Frame for Short term goals: Until discharge  Short term goal 1: Pt will complete all LB ADLs mod I using AE PRN and min cues for increased functional independence  Short term goal 2: Pt will complete all functional transfers with SBA and min cues for increased functional independence.   Short term goal 3: Pt will complete all aspects of toileting mod I for increased functional independence  Short term goal 4: Pt will participate in therex/act with focus on UE strengthening and home management with min cues for increased independence with IADL tasks

## 2020-10-16 NOTE — PROGRESS NOTES
ON discharge patient has redness to back, blanchable. Several healing incisions to R hip/thigh, and a red blister to R hip.  Verified with Nevaeh Mata RN.

## 2020-10-16 NOTE — PROGRESS NOTES
Physical Therapy    Physical Therapy Treatment Note  Name: Dahlia Egan MRN: [id number] :   1955   Date:  10/16/2020   Admission Date: 2020 Room:  [address] [place] [address]   Restrictions/Precautions:  Restrictions/Precautions  Restrictions/Precautions: Weight Bearing, Fall Risk(spinal precautions)  Required Braces or Orthoses?: No Lower Extremity Weight Bearing Restrictions  Right Lower Extremity Weight Bearing: Toe Touch Weight Bearing     Communication with other providers:  Spoke to team about discharge  Subjective:  Patient states:  \"Can you help with the car?\"  Pain:   Location, Type, Intensity (0/10 to 10/10): Does not report  Objective:    Observation:  P sitting up in wc  Treatment, including education/measures:  Functional mobility: P performs sit <> stand with supervision to RW. P ambulates x 5' to car with cues to turn. P backs up to chair and sits in car with min cues and able to bend R knee without issue  Education-p educated on appropriate height for RW, safety at home and progress.    Assessment / Impression:    P with remaining impairments in gait, balance and mobility and recommend HH PT at discharge   Patient's tolerance of treatment:  good   Adverse Reaction: none  Significant change in status and impact:  Improved transfers  Barriers to improvement:  pain  Plan for Next Session:    N/a due to discharge  Time in:  1047  Time out:  1100  Timed treatment minutes:  13  Total treatment time:  13    Previously filed items:  Social/Functional History  Lives With: Alone  Type of Home: Apartment  Home Layout: One level  Home Access: Level entry  Bathroom Shower/Tub: Tub/Shower unit  Bathroom Toilet: Standard  Bathroom Equipment: Grab bars around toilet  Bathroom Accessibility: Accessible  Receives Help From: Family  ADL Assistance: [address]: Independent  Homemaking Responsibilities: Yes  Ambulation Assistance: Independent  Transfer Assistance: Independent  Active : Yes  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   Short term goal 4: P will ambulate x 150' with RW and Celso while maintaining TTWB on RLE  Short term goal 5: P will perform car transfer with supervision       Electronically signed by:    Lolly Saxena PT  10/16/2020, 12:07 PM

## 2020-10-17 NOTE — DISCHARGE SUMMARY
Discharge Summary    Name:  Pretty Gaona /Age/Sex: 1955  (76 y.o. female)   MRN & CSN:  [id number] & [id number] Admission Date/Time: 2020  2:52 PM   Attending:  Trena att. providers found Discharging Physician: Harris Boateng MD     HPI:   Chief Complaint: Closed displaced comminuted fracture of shaft of right femur  Pretty Gaona is a 76 y.o.  female  who presents with postop ORIF postop day 7. Patient in swing bed for rehabilitation. Denies any headache blurred vision dyspnea denies any chest pain palpitation shortness of breath. Denies any fever chills nausea or vomiting. With a past medical significant for hypertension and diabetes most recent hemoglobin A1c 8.2. Also with chronic low back pain. Mostly just some right lower extremity pain that is improved. Patient ambulating better today. Continue to work with PT/OT    Hospital Course:     1. Status post ORIF right femur: Postop day 23. Patient continues to work with physical therapy. Currently with toe-touch weightbearing. Plan for patient to have bedside commode on discharge. Currently, transition from Percocet to Charlcie Ruck weaning from every 6 to every 8 hours at this time. Patient to follow-up with orthopedic surgery today. However, patient canceled her appointment due to concerns getting in vehicle. Had a long discussion that patient should have gone to appointment as she is continues to need narcotics 3 weeks after surgery. Attempting to wean using more acetaminophen. Unfortunately, unable to continue with [place].  Discussed with orthopedic surgeon and was to continue with Percocet. Patient was provided Percocet 5/325 every 8 hours #12 no refills. Has orthopedic follow-up on Monday, 10/19/2020     2. Hypertension: 164/72 this a.m. prior to medications. All high readings were performed in the AM prior to medications provided. Also, pain component. Discussed with patient follow-up with primary.     3.  Insulin-dependent diabetes: Recent A1c 8.2. Currently with basal insulin 20 units at bedtime. 5 units with meals continue glipizide 10 mg p.o. with breakfast.  Controlled less than 200 during stay     4. Low back pain: Persistent. Continue Lidoderm patch. Continue with physical therapy.     5. History of acute blood loss anemia: Postoperatively. No overt signs at this time. Trended CBCs continue to improve. H&H 2 days prior to discharge 11.8/38.7 improved    The patient expressed appropriate understanding of and agreement with the discharge recommendations, medications, and plan. Consults this admission:  [address]    Discharge Instruction:   Follow up appointments: Orthopedic surgeon on 10/19/2020  Primary care physician:  within 2 weeks    Diet:  diabetic diet   Activity: Home care PT continue with physical therapy, orthopedic recommendations  Disposition: Discharged to:   []Home, [x][facility], []SNF, []Acute Rehab, []Hospice   Condition on discharge: Stable    Discharge Medications:      Thi Panda   Home Medication Instructions [id number]    Printed on:10/17/20 0753   Medication Information                      cyclobenzaprine (FLEXERIL) 10 MG tablet  Take 1 tablet by mouth 3 times daily for 10 days             diclofenac sodium (VOLTAREN) 1 % GEL  Apply 2 g topically 2 times daily             famotidine (PEPCID) 20 MG tablet  Take 1 tablet by mouth 2 times daily             glipiZIDE (GLUCOTROL XL) 10 MG extended release tablet  Take 10 mg by mouth every morning (before breakfast)             HYDROcodone-acetaminophen (NORCO) 5-325 MG per tablet  Take 1 tablet by mouth every 8 hours for 3 days.              insulin glargine (LANTUS;BASAGLAR) 100 UNIT/ML injection pen  Inject 20 Units into the skin nightly             lidocaine 4 % external patch  Place 1 patch onto the skin daily             losartan (COZAAR) 50 MG tablet  Take 50 mg by mouth 2 times daily             metFORMIN (GLUCOPHAGE) 500 MG tablet  Take 500 mg by mouth 2 times daily (with meals)             oxyCODONE-acetaminophen (PERCOCET) 5-325 MG per tablet  Take 1 tablet by mouth every 6 hours as needed for Pain for up to 12 doses. Objective Findings at Discharge:   BP (!) 164/72   Pulse 86   Temp 96 °F (35.6 °C)   Resp 16   Ht 5' 8\" (1.727 m)   Wt 231 lb 9.6 oz (105.1 kg)   SpO2 94%   Breastfeeding No   BMI 35.21 kg/m²            PHYSICAL EXAM   GEN Awake female, sitting upright in bed in no apparent distress. Appears given age. EYES Pupils are equally round. No scleral erythema, discharge, or conjunctivitis. HENT Mucous membranes are moist.   NECK No apparent thyromegaly or masses. RESP Clear to auscultation, no wheezes, rales or rhonchi. Symmetric chest movement while on room air. CARDIO/VASC S1/S2 auscultated. Regular rate without appreciable murmurs, rubs, or gallops. Peripheral pulses equal bilaterally and palpable. No peripheral edema. GI Abdomen is soft without significant tenderness, masses, or guarding. Bowel sounds are normoactive. Rectal exam deferred.  Dean catheter is not present. HEME/LYMPH No petechiae or ecchymoses. MSK No gross joint deformities. Spontaneous movement of all extremities decreased active range of motion of the right lower extremity  SKIN Normal coloration, warm, dry. Surgical incision with staples removed no erythema or draining healing  NEURO Cranial nerves appear grossly intact, normal speech, no lateralizing weakness. PSYCH Awake, alert, oriented x 4. Affect appropriate.       Discharge Time of 25 minutes    Electronically signed by Abdiel Martinez MD on 10/17/2020 at 5:04 PM

## 2023-10-07 NOTE — PROGRESS NOTES
Nutrition Assessment     Type and Reason for Visit: Reassess(Swing Bed)    Nutrition Recommendations/Plan:  No new interventions at this time    Nutrition Assessment:  Oral intakes vary 0-100% of meals. Will continue supplement and diet as ordered. No new weights. Labs reviewed    Malnutrition Assessment:  Malnutrition Status: Mild malnutrition    Estimated Daily Nutrient Needs:  Energy (kcal): 0616-1086; Weight Used for Energy Requirements:  Ideal     Protein (g): 64-77; Weight Used for Protein Requirements:  Ideal(1-1.2)        Fluid (ml/day): 9279-9889; Weight Used for Fluid Requirements:  Ideal(1ml/kcal)        Current Nutrition Therapies:    DIET CARB CONTROL; Carb Control: 4 carb choices (60 gms)/meal  Dietary Nutrition Supplements: Diabetic Oral Supplement    Anthropometric Measures:  · Height: 5' 8\" (172.7 cm)  · Current Body Wt: 231 lb (104.8 kg)   · BMI: 35.1    Nutrition Diagnosis:   · Increased nutrient needs related to acute injury/trauma as evidenced by intake 51-75%      Nutrition Interventions:   Food and/or Nutrient Delivery:  Continue Current Diet, Continue Oral Nutrition Supplement  Nutrition Education/Counseling:  No recommendation at this time   Coordination of Nutrition Care:  Continued Inpatient Monitoring    Goals:  Oral intakes of meals and supplements will improve to consistently 51-75% of most during her stay       Nutrition Monitoring and Evaluation:   Behavioral-Environmental Outcomes:   Other (Comment)   Food/Nutrient Intake Outcomes:  Food and Nutrient Intake, Supplement Intake  Physical Signs/Symptoms Outcomes:  Biochemical Data, Skin, Weight     Discharge Planning:    Continue Oral Nutrition Supplement     Electronically signed by Nick Smith RD, ENRRIQUE on 10/8/20 at 2:25 PM EDT    Contact: [phone] normal... Well appearing, awake, alert, oriented to person, place, time/situation and in no apparent distress.

## 2024-03-03 ENCOUNTER — APPOINTMENT (OUTPATIENT)
Dept: GENERAL RADIOLOGY | Age: 69
End: 2024-03-03
Payer: COMMERCIAL

## 2024-03-03 ENCOUNTER — APPOINTMENT (OUTPATIENT)
Dept: CT IMAGING | Age: 69
End: 2024-03-03
Payer: COMMERCIAL

## 2024-03-03 ENCOUNTER — HOSPITAL ENCOUNTER (EMERGENCY)
Age: 69
Discharge: OTHER INSTITUTION | End: 2024-03-04
Attending: EMERGENCY MEDICINE
Payer: COMMERCIAL

## 2024-03-03 DIAGNOSIS — I50.9 ACUTE CONGESTIVE HEART FAILURE, UNSPECIFIED HEART FAILURE TYPE (HCC): ICD-10-CM

## 2024-03-03 DIAGNOSIS — I16.0 HYPERTENSIVE URGENCY: Primary | ICD-10-CM

## 2024-03-03 DIAGNOSIS — R41.0 CONFUSION: ICD-10-CM

## 2024-03-03 LAB
ALBUMIN SERPL-MCNC: 3.7 GM/DL (ref 3.4–5)
ALP BLD-CCNC: 73 IU/L (ref 40–129)
ALT SERPL-CCNC: 8 U/L (ref 10–40)
ANION GAP SERPL CALCULATED.3IONS-SCNC: 16 MMOL/L (ref 7–16)
APTT: 30 SECONDS (ref 25.1–37.1)
AST SERPL-CCNC: 12 IU/L (ref 15–37)
BASOPHILS ABSOLUTE: 0.1 K/CU MM
BASOPHILS RELATIVE PERCENT: 0.4 % (ref 0–1)
BILIRUB SERPL-MCNC: 0.8 MG/DL (ref 0–1)
BILIRUBIN URINE: NEGATIVE MG/DL
BLOOD, URINE: ABNORMAL
BUN SERPL-MCNC: 15 MG/DL (ref 6–23)
CALCIUM SERPL-MCNC: 8.7 MG/DL (ref 8.3–10.6)
CHLORIDE BLD-SCNC: 96 MMOL/L (ref 99–110)
CLARITY: CLEAR
CO2: 23 MMOL/L (ref 21–32)
COLOR: YELLOW
CREAT SERPL-MCNC: 1.1 MG/DL (ref 0.6–1.1)
DIFFERENTIAL TYPE: ABNORMAL
EOSINOPHILS ABSOLUTE: 0.1 K/CU MM
EOSINOPHILS RELATIVE PERCENT: 0.6 % (ref 0–3)
GFR SERPL CREATININE-BSD FRML MDRD: 54 ML/MIN/1.73M2
GLUCOSE BLD-MCNC: 180 MG/DL (ref 70–99)
GLUCOSE SERPL-MCNC: 187 MG/DL (ref 70–99)
GLUCOSE, URINE: NEGATIVE MG/DL
HCT VFR BLD CALC: 38.2 % (ref 37–47)
HEMOGLOBIN: 12 GM/DL (ref 12.5–16)
IMMATURE NEUTROPHIL %: 0.5 % (ref 0–0.43)
INR BLD: 1 INDEX
KETONES, URINE: ABNORMAL MG/DL
LACTIC ACID, SEPSIS: 2.2 MMOL/L (ref 0.4–2)
LEUKOCYTE ESTERASE, URINE: ABNORMAL
LYMPHOCYTES ABSOLUTE: 2 K/CU MM
LYMPHOCYTES RELATIVE PERCENT: 12.6 % (ref 24–44)
MAGNESIUM: 1.6 MG/DL (ref 1.8–2.4)
MCH RBC QN AUTO: 27.7 PG (ref 27–31)
MCHC RBC AUTO-ENTMCNC: 31.4 % (ref 32–36)
MCV RBC AUTO: 88.2 FL (ref 78–100)
MONOCYTES ABSOLUTE: 1.1 K/CU MM
MONOCYTES RELATIVE PERCENT: 6.9 % (ref 0–4)
NITRITE URINE, QUANTITATIVE: NEGATIVE
PDW BLD-RTO: 14.8 % (ref 11.7–14.9)
PH, URINE: 5.5 (ref 5–8)
PLATELET # BLD: 342 K/CU MM (ref 140–440)
PMV BLD AUTO: 10.4 FL (ref 7.5–11.1)
POTASSIUM SERPL-SCNC: 3.6 MMOL/L (ref 3.5–5.1)
PRO-BNP: 5307 PG/ML
PROTEIN UA: 100 MG/DL
PROTHROMBIN TIME: 14.2 SECONDS (ref 11.7–14.5)
RBC # BLD: 4.33 M/CU MM (ref 4.2–5.4)
SEGMENTED NEUTROPHILS ABSOLUTE COUNT: 12.5 K/CU MM
SEGMENTED NEUTROPHILS RELATIVE PERCENT: 79 % (ref 36–66)
SODIUM BLD-SCNC: 135 MMOL/L (ref 135–145)
SPECIFIC GRAVITY UA: 1.02 (ref 1–1.03)
TOTAL IMMATURE NEUTOROPHIL: 0.08 K/CU MM
TOTAL PROTEIN: 6.8 GM/DL (ref 6.4–8.2)
TROPONIN, HIGH SENSITIVITY: 30 NG/L (ref 0–14)
TROPONIN, HIGH SENSITIVITY: 40 NG/L (ref 0–14)
UROBILINOGEN, URINE: 0.2 MG/DL (ref 0.2–1)
WBC # BLD: 15.8 K/CU MM (ref 4–10.5)

## 2024-03-03 PROCEDURE — 6360000004 HC RX CONTRAST MEDICATION: Performed by: EMERGENCY MEDICINE

## 2024-03-03 PROCEDURE — 99285 EMERGENCY DEPT VISIT HI MDM: CPT

## 2024-03-03 PROCEDURE — 82962 GLUCOSE BLOOD TEST: CPT

## 2024-03-03 PROCEDURE — 81001 URINALYSIS AUTO W/SCOPE: CPT

## 2024-03-03 PROCEDURE — 83880 ASSAY OF NATRIURETIC PEPTIDE: CPT

## 2024-03-03 PROCEDURE — 70498 CT ANGIOGRAPHY NECK: CPT

## 2024-03-03 PROCEDURE — 85730 THROMBOPLASTIN TIME PARTIAL: CPT

## 2024-03-03 PROCEDURE — 83605 ASSAY OF LACTIC ACID: CPT

## 2024-03-03 PROCEDURE — 96375 TX/PRO/DX INJ NEW DRUG ADDON: CPT

## 2024-03-03 PROCEDURE — 6360000002 HC RX W HCPCS: Performed by: EMERGENCY MEDICINE

## 2024-03-03 PROCEDURE — 87040 BLOOD CULTURE FOR BACTERIA: CPT

## 2024-03-03 PROCEDURE — 96365 THER/PROPH/DIAG IV INF INIT: CPT

## 2024-03-03 PROCEDURE — 70450 CT HEAD/BRAIN W/O DYE: CPT

## 2024-03-03 PROCEDURE — 85610 PROTHROMBIN TIME: CPT

## 2024-03-03 PROCEDURE — 85025 COMPLETE CBC W/AUTO DIFF WBC: CPT

## 2024-03-03 PROCEDURE — 71045 X-RAY EXAM CHEST 1 VIEW: CPT

## 2024-03-03 PROCEDURE — 93005 ELECTROCARDIOGRAM TRACING: CPT | Performed by: EMERGENCY MEDICINE

## 2024-03-03 PROCEDURE — 83735 ASSAY OF MAGNESIUM: CPT

## 2024-03-03 PROCEDURE — 96368 THER/DIAG CONCURRENT INF: CPT

## 2024-03-03 PROCEDURE — 84484 ASSAY OF TROPONIN QUANT: CPT

## 2024-03-03 PROCEDURE — 96366 THER/PROPH/DIAG IV INF ADDON: CPT

## 2024-03-03 PROCEDURE — 80053 COMPREHEN METABOLIC PANEL: CPT

## 2024-03-03 RX ORDER — MAGNESIUM SULFATE IN WATER 40 MG/ML
2000 INJECTION, SOLUTION INTRAVENOUS ONCE
Status: COMPLETED | OUTPATIENT
Start: 2024-03-03 | End: 2024-03-04

## 2024-03-03 RX ORDER — HYDROCODONE BITARTRATE AND ACETAMINOPHEN 5; 325 MG/1; MG/1
1 TABLET ORAL ONCE
Status: DISCONTINUED | OUTPATIENT
Start: 2024-03-03 | End: 2024-03-03

## 2024-03-03 RX ORDER — FUROSEMIDE 10 MG/ML
40 INJECTION INTRAMUSCULAR; INTRAVENOUS ONCE
Status: COMPLETED | OUTPATIENT
Start: 2024-03-04 | End: 2024-03-03

## 2024-03-03 RX ORDER — DIAZEPAM 5 MG/ML
5 INJECTION, SOLUTION INTRAMUSCULAR; INTRAVENOUS ONCE
Status: COMPLETED | OUTPATIENT
Start: 2024-03-03 | End: 2024-03-03

## 2024-03-03 RX ADMIN — MAGNESIUM SULFATE HEPTAHYDRATE 2000 MG: 40 INJECTION, SOLUTION INTRAVENOUS at 23:31

## 2024-03-03 RX ADMIN — NICARDIPINE HYDROCHLORIDE 5 MG/HR: 0.1 INJECTION, SOLUTION INTRAVENOUS at 22:38

## 2024-03-03 RX ADMIN — DIAZEPAM 5 MG: 5 INJECTION INTRAMUSCULAR; INTRAVENOUS at 22:43

## 2024-03-03 RX ADMIN — FUROSEMIDE 40 MG: 10 INJECTION, SOLUTION INTRAMUSCULAR; INTRAVENOUS at 23:59

## 2024-03-03 RX ADMIN — IOPAMIDOL 75 ML: 755 INJECTION, SOLUTION INTRAVENOUS at 22:20

## 2024-03-03 ASSESSMENT — PAIN - FUNCTIONAL ASSESSMENT: PAIN_FUNCTIONAL_ASSESSMENT: NONE - DENIES PAIN

## 2024-03-04 ENCOUNTER — HOSPITAL ENCOUNTER (INPATIENT)
Age: 69
LOS: 2 days | Discharge: HOME | DRG: 242 | End: 2024-03-06
Attending: STUDENT IN AN ORGANIZED HEALTH CARE EDUCATION/TRAINING PROGRAM | Admitting: STUDENT IN AN ORGANIZED HEALTH CARE EDUCATION/TRAINING PROGRAM
Payer: COMMERCIAL

## 2024-03-04 ENCOUNTER — APPOINTMENT (OUTPATIENT)
Dept: CT IMAGING | Age: 69
DRG: 242 | End: 2024-03-04
Attending: STUDENT IN AN ORGANIZED HEALTH CARE EDUCATION/TRAINING PROGRAM
Payer: COMMERCIAL

## 2024-03-04 ENCOUNTER — APPOINTMENT (OUTPATIENT)
Dept: NON INVASIVE DIAGNOSTICS | Age: 69
DRG: 242 | End: 2024-03-04
Attending: INTERNAL MEDICINE
Payer: COMMERCIAL

## 2024-03-04 VITALS
WEIGHT: 215 LBS | HEART RATE: 48 BPM | DIASTOLIC BLOOD PRESSURE: 60 MMHG | RESPIRATION RATE: 21 BRPM | SYSTOLIC BLOOD PRESSURE: 167 MMHG | BODY MASS INDEX: 34.55 KG/M2 | TEMPERATURE: 99.2 F | OXYGEN SATURATION: 94 % | HEIGHT: 66 IN

## 2024-03-04 DIAGNOSIS — I16.1 HYPERTENSIVE EMERGENCY: Primary | ICD-10-CM

## 2024-03-04 PROBLEM — I44.2 COMPLETE HEART BLOCK (HCC): Status: ACTIVE | Noted: 2024-03-04

## 2024-03-04 LAB
ALBUMIN SERPL-MCNC: 3.8 GM/DL (ref 3.4–5)
ALP BLD-CCNC: 72 IU/L (ref 40–129)
ALT SERPL-CCNC: 8 U/L (ref 10–40)
ANION GAP SERPL CALCULATED.3IONS-SCNC: 13 MMOL/L (ref 7–16)
AST SERPL-CCNC: 11 IU/L (ref 15–37)
BACTERIA: ABNORMAL /HPF
BASOPHILS ABSOLUTE: 0.1 K/CU MM
BASOPHILS RELATIVE PERCENT: 0.5 % (ref 0–1)
BILIRUB SERPL-MCNC: 0.9 MG/DL (ref 0–1)
BUN SERPL-MCNC: 17 MG/DL (ref 6–23)
CALCIUM SERPL-MCNC: 8.8 MG/DL (ref 8.3–10.6)
CAST TYPE: ABNORMAL /HPF
CHLORIDE BLD-SCNC: 99 MMOL/L (ref 99–110)
CO2: 24 MMOL/L (ref 21–32)
CREAT SERPL-MCNC: 1.2 MG/DL (ref 0.6–1.1)
CRYSTAL TYPE: NEGATIVE /HPF
D DIMER: 2.01 UG/ML (FEU)
DIFFERENTIAL TYPE: ABNORMAL
ECHO AO ROOT DIAM: 2.8 CM
ECHO AO ROOT INDEX: 1.37 CM/M2
ECHO AV AREA PEAK VELOCITY: 2.2 CM2
ECHO AV AREA VTI: 2.4 CM2
ECHO AV AREA/BSA PEAK VELOCITY: 1.1 CM2/M2
ECHO AV AREA/BSA VTI: 1.2 CM2/M2
ECHO AV MEAN GRADIENT: 5 MMHG
ECHO AV MEAN VELOCITY: 1 M/S
ECHO AV PEAK GRADIENT: 13 MMHG
ECHO AV PEAK VELOCITY: 1.8 M/S
ECHO AV VELOCITY RATIO: 0.61
ECHO AV VTI: 34.7 CM
ECHO BSA: 2.1 M2
ECHO BSA: 2.1 M2
ECHO IVC PROX: 1.9 CM
ECHO LA AREA 4C: 22.3 CM2
ECHO LA DIAMETER INDEX: 2.16 CM/M2
ECHO LA DIAMETER: 4.4 CM
ECHO LA MAJOR AXIS: 6.4 CM
ECHO LA TO AORTIC ROOT RATIO: 1.57
ECHO LA VOL MOD A4C: 64 ML (ref 22–52)
ECHO LA VOLUME INDEX MOD A4C: 31 ML/M2 (ref 16–34)
ECHO LV E' LATERAL VELOCITY: 11 CM/S
ECHO LV E' SEPTAL VELOCITY: 6 CM/S
ECHO LV EDV A4C: 128 ML
ECHO LV EDV INDEX A4C: 63 ML/M2
ECHO LV EJECTION FRACTION A4C: 59 %
ECHO LV ESV A4C: 53 ML
ECHO LV ESV INDEX A4C: 26 ML/M2
ECHO LV FRACTIONAL SHORTENING: 33 % (ref 28–44)
ECHO LV INTERNAL DIMENSION DIASTOLE INDEX: 2.11 CM/M2
ECHO LV INTERNAL DIMENSION DIASTOLIC: 4.3 CM (ref 3.9–5.3)
ECHO LV INTERNAL DIMENSION SYSTOLIC INDEX: 1.42 CM/M2
ECHO LV INTERNAL DIMENSION SYSTOLIC: 2.9 CM
ECHO LV IVSD: 1.1 CM (ref 0.6–0.9)
ECHO LV MASS 2D: 162.9 G (ref 67–162)
ECHO LV MASS INDEX 2D: 79.9 G/M2 (ref 43–95)
ECHO LV POSTERIOR WALL DIASTOLIC: 1.1 CM (ref 0.6–0.9)
ECHO LV RELATIVE WALL THICKNESS RATIO: 0.51
ECHO LVOT AREA: 3.5 CM2
ECHO LVOT AV VTI INDEX: 0.68
ECHO LVOT DIAM: 2.1 CM
ECHO LVOT MEAN GRADIENT: 2 MMHG
ECHO LVOT PEAK GRADIENT: 5 MMHG
ECHO LVOT PEAK VELOCITY: 1.1 M/S
ECHO LVOT STROKE VOLUME INDEX: 40 ML/M2
ECHO LVOT SV: 81.7 ML
ECHO LVOT VTI: 23.6 CM
ECHO MV A VELOCITY: 0.65 M/S
ECHO MV E VELOCITY: 1.38 M/S
ECHO MV E/A RATIO: 2.12
ECHO MV E/E' LATERAL: 12.55
ECHO MV E/E' RATIO (AVERAGED): 17.77
ECHO RV MID DIMENSION: 3 CM
EKG ATRIAL RATE: 117 BPM
EKG ATRIAL RATE: 55 BPM
EKG DIAGNOSIS: NORMAL
EKG DIAGNOSIS: NORMAL
EKG P AXIS: 64 DEGREES
EKG Q-T INTERVAL: 540 MS
EKG Q-T INTERVAL: 656 MS
EKG QRS DURATION: 132 MS
EKG QRS DURATION: 146 MS
EKG QTC CALCULATION (BAZETT): 512 MS
EKG QTC CALCULATION (BAZETT): 580 MS
EKG R AXIS: 72 DEGREES
EKG R AXIS: 75 DEGREES
EKG T AXIS: -1 DEGREES
EKG T AXIS: -30 DEGREES
EKG VENTRICULAR RATE: 47 BPM
EKG VENTRICULAR RATE: 54 BPM
EOSINOPHILS ABSOLUTE: 0 K/CU MM
EOSINOPHILS RELATIVE PERCENT: 0.1 % (ref 0–3)
EPITHELIAL CELLS, UA: 0 /HPF
GFR SERPL CREATININE-BSD FRML MDRD: 49 ML/MIN/1.73M2
GLUCOSE BLD-MCNC: 155 MG/DL (ref 70–99)
GLUCOSE BLD-MCNC: 181 MG/DL (ref 70–99)
GLUCOSE BLD-MCNC: 209 MG/DL (ref 70–99)
GLUCOSE BLD-MCNC: 236 MG/DL (ref 70–99)
GLUCOSE SERPL-MCNC: 291 MG/DL (ref 70–99)
HCT VFR BLD CALC: 38.1 % (ref 37–47)
HEMOGLOBIN: 11.7 GM/DL (ref 12.5–16)
IMMATURE NEUTROPHIL %: 0.6 % (ref 0–0.43)
INR BLD: 1.1 INDEX
LACTIC ACID, SEPSIS: 1.4 MMOL/L (ref 0.4–2)
LYMPHOCYTES ABSOLUTE: 0.9 K/CU MM
LYMPHOCYTES RELATIVE PERCENT: 6 % (ref 24–44)
MCH RBC QN AUTO: 27.3 PG (ref 27–31)
MCHC RBC AUTO-ENTMCNC: 30.7 % (ref 32–36)
MCV RBC AUTO: 88.8 FL (ref 78–100)
MONOCYTES ABSOLUTE: 0.6 K/CU MM
MONOCYTES RELATIVE PERCENT: 4.2 % (ref 0–4)
NUCLEATED RBC %: 0 %
PDW BLD-RTO: 14.9 % (ref 11.7–14.9)
PLATELET # BLD: 349 K/CU MM (ref 140–440)
PMV BLD AUTO: 10.4 FL (ref 7.5–11.1)
POTASSIUM SERPL-SCNC: 3.9 MMOL/L (ref 3.5–5.1)
PROTHROMBIN TIME: 14.7 SECONDS (ref 11.7–14.5)
RBC # BLD: 4.29 M/CU MM (ref 4.2–5.4)
RBC URINE: 3 /HPF (ref 0–6)
SEGMENTED NEUTROPHILS ABSOLUTE COUNT: 13.5 K/CU MM
SEGMENTED NEUTROPHILS RELATIVE PERCENT: 88.6 % (ref 36–66)
SODIUM BLD-SCNC: 136 MMOL/L (ref 135–145)
TOTAL IMMATURE NEUTOROPHIL: 0.09 K/CU MM
TOTAL NUCLEATED RBC: 0 K/CU MM
TOTAL PROTEIN: 6.5 GM/DL (ref 6.4–8.2)
WBC # BLD: 15.3 K/CU MM (ref 4–10.5)
WBC UA: 1 /HPF (ref 0–5)

## 2024-03-04 PROCEDURE — 2500000003 HC RX 250 WO HCPCS

## 2024-03-04 PROCEDURE — 93306 TTE W/DOPPLER COMPLETE: CPT

## 2024-03-04 PROCEDURE — 6360000002 HC RX W HCPCS

## 2024-03-04 PROCEDURE — 6360000004 HC RX CONTRAST MEDICATION: Performed by: INTERNAL MEDICINE

## 2024-03-04 PROCEDURE — 82962 GLUCOSE BLOOD TEST: CPT

## 2024-03-04 PROCEDURE — 85379 FIBRIN DEGRADATION QUANT: CPT

## 2024-03-04 PROCEDURE — 2580000003 HC RX 258

## 2024-03-04 PROCEDURE — 93005 ELECTROCARDIOGRAM TRACING: CPT | Performed by: STUDENT IN AN ORGANIZED HEALTH CARE EDUCATION/TRAINING PROGRAM

## 2024-03-04 PROCEDURE — C1892 INTRO/SHEATH,FIXED,PEEL-AWAY: HCPCS | Performed by: INTERNAL MEDICINE

## 2024-03-04 PROCEDURE — 93010 ELECTROCARDIOGRAM REPORT: CPT | Performed by: INTERNAL MEDICINE

## 2024-03-04 PROCEDURE — 2500000003 HC RX 250 WO HCPCS: Performed by: INTERNAL MEDICINE

## 2024-03-04 PROCEDURE — 2580000003 HC RX 258: Performed by: STUDENT IN AN ORGANIZED HEALTH CARE EDUCATION/TRAINING PROGRAM

## 2024-03-04 PROCEDURE — 6360000004 HC RX CONTRAST MEDICATION

## 2024-03-04 PROCEDURE — 85025 COMPLETE CBC W/AUTO DIFF WBC: CPT

## 2024-03-04 PROCEDURE — 96366 THER/PROPH/DIAG IV INF ADDON: CPT

## 2024-03-04 PROCEDURE — 36415 COLL VENOUS BLD VENIPUNCTURE: CPT

## 2024-03-04 PROCEDURE — 80053 COMPREHEN METABOLIC PANEL: CPT

## 2024-03-04 PROCEDURE — 2709999900 HC NON-CHARGEABLE SUPPLY: Performed by: INTERNAL MEDICINE

## 2024-03-04 PROCEDURE — 83605 ASSAY OF LACTIC ACID: CPT

## 2024-03-04 PROCEDURE — 2580000003 HC RX 258: Performed by: INTERNAL MEDICINE

## 2024-03-04 PROCEDURE — 2580000003 HC RX 258: Performed by: NURSE PRACTITIONER

## 2024-03-04 PROCEDURE — 02HK3JZ INSERTION OF PACEMAKER LEAD INTO RIGHT VENTRICLE, PERCUTANEOUS APPROACH: ICD-10-PCS | Performed by: INTERNAL MEDICINE

## 2024-03-04 PROCEDURE — 85610 PROTHROMBIN TIME: CPT

## 2024-03-04 PROCEDURE — C1785 PMKR, DUAL, RATE-RESP: HCPCS | Performed by: INTERNAL MEDICINE

## 2024-03-04 PROCEDURE — 93306 TTE W/DOPPLER COMPLETE: CPT | Performed by: INTERNAL MEDICINE

## 2024-03-04 PROCEDURE — 6360000002 HC RX W HCPCS: Performed by: NURSE PRACTITIONER

## 2024-03-04 PROCEDURE — 6370000000 HC RX 637 (ALT 250 FOR IP): Performed by: STUDENT IN AN ORGANIZED HEALTH CARE EDUCATION/TRAINING PROGRAM

## 2024-03-04 PROCEDURE — 2140000000 HC CCU INTERMEDIATE R&B

## 2024-03-04 PROCEDURE — A4217 STERILE WATER/SALINE, 500 ML: HCPCS | Performed by: INTERNAL MEDICINE

## 2024-03-04 PROCEDURE — 94761 N-INVAS EAR/PLS OXIMETRY MLT: CPT

## 2024-03-04 PROCEDURE — 33208 INSRT HEART PM ATRIAL & VENT: CPT | Performed by: INTERNAL MEDICINE

## 2024-03-04 PROCEDURE — 02H63JZ INSERTION OF PACEMAKER LEAD INTO RIGHT ATRIUM, PERCUTANEOUS APPROACH: ICD-10-PCS | Performed by: INTERNAL MEDICINE

## 2024-03-04 PROCEDURE — B51N0ZZ FLUOROSCOPY OF LEFT UPPER EXTREMITY VEINS USING HIGH OSMOLAR CONTRAST: ICD-10-PCS | Performed by: INTERNAL MEDICINE

## 2024-03-04 PROCEDURE — 74174 CTA ABD&PLVS W/CONTRAST: CPT

## 2024-03-04 PROCEDURE — 0JH606Z INSERTION OF PACEMAKER, DUAL CHAMBER INTO CHEST SUBCUTANEOUS TISSUE AND FASCIA, OPEN APPROACH: ICD-10-PCS | Performed by: INTERNAL MEDICINE

## 2024-03-04 PROCEDURE — 99222 1ST HOSP IP/OBS MODERATE 55: CPT | Performed by: INTERNAL MEDICINE

## 2024-03-04 PROCEDURE — 6360000002 HC RX W HCPCS: Performed by: EMERGENCY MEDICINE

## 2024-03-04 PROCEDURE — C1898 LEAD, PMKR, OTHER THAN TRANS: HCPCS | Performed by: INTERNAL MEDICINE

## 2024-03-04 PROCEDURE — 6360000002 HC RX W HCPCS: Performed by: INTERNAL MEDICINE

## 2024-03-04 DEVICE — LEAD 5076-58 MRI US RCMCRD
Type: IMPLANTABLE DEVICE | Status: FUNCTIONAL
Brand: CAPSUREFIX NOVUS MRI™ SURESCAN®

## 2024-03-04 DEVICE — PACEMAKER CARD 22.5GM W50.8XH46.6MM D7.4MM TI POLYUR SIL: Type: IMPLANTABLE DEVICE | Status: FUNCTIONAL

## 2024-03-04 DEVICE — LEAD 5076-52 MRI US RCMCRD
Type: IMPLANTABLE DEVICE | Status: FUNCTIONAL
Brand: CAPSUREFIX NOVUS MRI™ SURESCAN®

## 2024-03-04 RX ORDER — INSULIN LISPRO 100 [IU]/ML
0-4 INJECTION, SOLUTION INTRAVENOUS; SUBCUTANEOUS
Status: DISCONTINUED | OUTPATIENT
Start: 2024-03-04 | End: 2024-03-06 | Stop reason: HOSPADM

## 2024-03-04 RX ORDER — ACETAMINOPHEN 650 MG/1
650 SUPPOSITORY RECTAL EVERY 6 HOURS PRN
Status: DISCONTINUED | OUTPATIENT
Start: 2024-03-04 | End: 2024-03-06 | Stop reason: HOSPADM

## 2024-03-04 RX ORDER — SODIUM CHLORIDE 0.9 % (FLUSH) 0.9 %
5-40 SYRINGE (ML) INJECTION EVERY 12 HOURS SCHEDULED
Status: CANCELLED | OUTPATIENT
Start: 2024-03-04

## 2024-03-04 RX ORDER — SODIUM CHLORIDE 9 MG/ML
INJECTION, SOLUTION INTRAVENOUS PRN
Status: CANCELLED | OUTPATIENT
Start: 2024-03-04

## 2024-03-04 RX ORDER — GLUCAGON 1 MG/ML
1 KIT INJECTION PRN
Status: DISCONTINUED | OUTPATIENT
Start: 2024-03-04 | End: 2024-03-06 | Stop reason: HOSPADM

## 2024-03-04 RX ORDER — PROCHLORPERAZINE EDISYLATE 5 MG/ML
5 INJECTION INTRAMUSCULAR; INTRAVENOUS EVERY 6 HOURS PRN
Status: DISCONTINUED | OUTPATIENT
Start: 2024-03-04 | End: 2024-03-06 | Stop reason: HOSPADM

## 2024-03-04 RX ORDER — INSULIN LISPRO 100 [IU]/ML
0-4 INJECTION, SOLUTION INTRAVENOUS; SUBCUTANEOUS EVERY 4 HOURS
Status: DISCONTINUED | OUTPATIENT
Start: 2024-03-04 | End: 2024-03-06 | Stop reason: HOSPADM

## 2024-03-04 RX ORDER — CEFAZOLIN SODIUM 1 G/3ML
INJECTION, POWDER, FOR SOLUTION INTRAMUSCULAR; INTRAVENOUS PRN
Status: DISCONTINUED | OUTPATIENT
Start: 2024-03-04 | End: 2024-03-04 | Stop reason: HOSPADM

## 2024-03-04 RX ORDER — SODIUM CHLORIDE 9 MG/ML
INJECTION, SOLUTION INTRAVENOUS CONTINUOUS PRN
Status: COMPLETED | OUTPATIENT
Start: 2024-03-04 | End: 2024-03-04

## 2024-03-04 RX ORDER — POTASSIUM CHLORIDE 20 MEQ/1
40 TABLET, EXTENDED RELEASE ORAL PRN
Status: DISCONTINUED | OUTPATIENT
Start: 2024-03-04 | End: 2024-03-06 | Stop reason: HOSPADM

## 2024-03-04 RX ORDER — ACETAMINOPHEN 325 MG/1
650 TABLET ORAL EVERY 6 HOURS PRN
Status: DISCONTINUED | OUTPATIENT
Start: 2024-03-04 | End: 2024-03-06 | Stop reason: HOSPADM

## 2024-03-04 RX ORDER — SODIUM CHLORIDE 0.9 % (FLUSH) 0.9 %
5-40 SYRINGE (ML) INJECTION PRN
Status: DISCONTINUED | OUTPATIENT
Start: 2024-03-04 | End: 2024-03-06 | Stop reason: HOSPADM

## 2024-03-04 RX ORDER — POLYETHYLENE GLYCOL 3350 17 G/17G
17 POWDER, FOR SOLUTION ORAL DAILY PRN
Status: DISCONTINUED | OUTPATIENT
Start: 2024-03-04 | End: 2024-03-06 | Stop reason: HOSPADM

## 2024-03-04 RX ORDER — ENOXAPARIN SODIUM 100 MG/ML
40 INJECTION SUBCUTANEOUS DAILY
Status: DISCONTINUED | OUTPATIENT
Start: 2024-03-04 | End: 2024-03-06 | Stop reason: HOSPADM

## 2024-03-04 RX ORDER — INSULIN LISPRO 100 [IU]/ML
0-4 INJECTION, SOLUTION INTRAVENOUS; SUBCUTANEOUS NIGHTLY
Status: DISCONTINUED | OUTPATIENT
Start: 2024-03-04 | End: 2024-03-06 | Stop reason: HOSPADM

## 2024-03-04 RX ORDER — ONDANSETRON 4 MG/1
4 TABLET, ORALLY DISINTEGRATING ORAL EVERY 8 HOURS PRN
Status: DISCONTINUED | OUTPATIENT
Start: 2024-03-04 | End: 2024-03-04

## 2024-03-04 RX ORDER — ONDANSETRON 2 MG/ML
4 INJECTION INTRAMUSCULAR; INTRAVENOUS EVERY 6 HOURS PRN
Status: DISCONTINUED | OUTPATIENT
Start: 2024-03-04 | End: 2024-03-04

## 2024-03-04 RX ORDER — ACETAMINOPHEN 325 MG/1
650 TABLET ORAL EVERY 4 HOURS PRN
Status: CANCELLED | OUTPATIENT
Start: 2024-03-04

## 2024-03-04 RX ORDER — METOPROLOL TARTRATE 1 MG/ML
INJECTION, SOLUTION INTRAVENOUS PRN
Status: DISCONTINUED | OUTPATIENT
Start: 2024-03-04 | End: 2024-03-04 | Stop reason: HOSPADM

## 2024-03-04 RX ORDER — SODIUM CHLORIDE 0.9 % (FLUSH) 0.9 %
5-40 SYRINGE (ML) INJECTION EVERY 12 HOURS SCHEDULED
Status: DISCONTINUED | OUTPATIENT
Start: 2024-03-04 | End: 2024-03-06 | Stop reason: HOSPADM

## 2024-03-04 RX ORDER — POTASSIUM CHLORIDE 7.45 MG/ML
10 INJECTION INTRAVENOUS PRN
Status: DISCONTINUED | OUTPATIENT
Start: 2024-03-04 | End: 2024-03-06 | Stop reason: HOSPADM

## 2024-03-04 RX ORDER — TRAMADOL HYDROCHLORIDE 50 MG/1
50 TABLET ORAL EVERY 6 HOURS PRN
Status: CANCELLED | OUTPATIENT
Start: 2024-03-04

## 2024-03-04 RX ORDER — SODIUM CHLORIDE 9 MG/ML
INJECTION, SOLUTION INTRAVENOUS PRN
Status: DISCONTINUED | OUTPATIENT
Start: 2024-03-04 | End: 2024-03-06 | Stop reason: HOSPADM

## 2024-03-04 RX ORDER — MAGNESIUM SULFATE IN WATER 40 MG/ML
2000 INJECTION, SOLUTION INTRAVENOUS PRN
Status: DISCONTINUED | OUTPATIENT
Start: 2024-03-04 | End: 2024-03-06 | Stop reason: HOSPADM

## 2024-03-04 RX ORDER — SODIUM CHLORIDE 0.9 % (FLUSH) 0.9 %
5-40 SYRINGE (ML) INJECTION PRN
Status: CANCELLED | OUTPATIENT
Start: 2024-03-04

## 2024-03-04 RX ORDER — DEXTROSE MONOHYDRATE 100 MG/ML
INJECTION, SOLUTION INTRAVENOUS CONTINUOUS PRN
Status: DISCONTINUED | OUTPATIENT
Start: 2024-03-04 | End: 2024-03-06 | Stop reason: HOSPADM

## 2024-03-04 RX ADMIN — SODIUM CHLORIDE, PRESERVATIVE FREE 10 ML: 5 INJECTION INTRAVENOUS at 20:38

## 2024-03-04 RX ADMIN — NICARDIPINE HYDROCHLORIDE 7.5 MG/HR: 0.1 INJECTION, SOLUTION INTRAVENOUS at 01:33

## 2024-03-04 RX ADMIN — ACETAMINOPHEN 650 MG: 325 TABLET ORAL at 12:54

## 2024-03-04 RX ADMIN — CEFAZOLIN 2000 MG: 2 INJECTION, POWDER, FOR SOLUTION INTRAMUSCULAR; INTRAVENOUS at 17:57

## 2024-03-04 RX ADMIN — IOPAMIDOL 90 ML: 755 INJECTION, SOLUTION INTRAVENOUS at 17:28

## 2024-03-04 ASSESSMENT — ENCOUNTER SYMPTOMS
CHEST TIGHTNESS: 0
VOMITING: 0
WHEEZING: 0
COLOR CHANGE: 0
ABDOMINAL PAIN: 0
EYE PAIN: 0
DIARRHEA: 0
CONSTIPATION: 0
PHOTOPHOBIA: 0
COUGH: 0
SHORTNESS OF BREATH: 0
BLOOD IN STOOL: 0
BACK PAIN: 0
NAUSEA: 0

## 2024-03-04 NOTE — DISCHARGE INSTRUCTIONS
1. Avoid raising the arm above shoulder for 4 weeks  2. No driving for 10 days  3. No lifting more than 10 lbs with the left hand  4. Do not wet the area of surgery for 10 days  5. Follow up appointment with Doctor for wound check in 10 days  6. Notify Doctor if pain, fever, chills, swelling or bleeding in the surgical area  7. Please avoid sleeping on the surgical side.   8. Please do not allow anyone other than Dr Martínez's staff to remove or redress the surgical site. If the dressing were to fall off or fall partially off before the 10 day follow up appointment, please call the office ASAP.

## 2024-03-04 NOTE — ED NOTES
This RN tried to call report to 3N at [phone]. Charge nurse sent call to Maria Dolores COOK who stated she would call back for report.

## 2024-03-04 NOTE — H&P
Lived in the Last Year: 1     Unstable Housing in the Last Year: No       Medications Prior to Admission     Prior to Admission medications    Medication Sig Start Date End Date Taking? Authorizing Provider   insulin glargine (LANTUS;BASAGLAR) 100 UNIT/ML injection pen Inject 20 Units into the skin nightly  Patient not taking: Reported on 3/4/2024 10/16/20   Chester Minaya MD   lidocaine 4 % external patch Place 1 patch onto the skin daily 10/16/20   Chester Minaya MD   famotidine (PEPCID) 20 MG tablet Take 1 tablet by mouth 2 times daily 10/16/20   Chester Minaya MD   glipiZIDE (GLUCOTROL XL) 10 MG extended release tablet Take 1 tablet by mouth every morning (before breakfast) 8/26/20   Romero Ragland MD   losartan (COZAAR) 50 MG tablet Take 1 tablet by mouth 2 times daily    Romero Ragland MD   metFORMIN (GLUCOPHAGE) 500 MG tablet Take 1 tablet by mouth 2 times daily (with meals)    ProviderRomero MD       Medications:     Medications:    insulin lispro  0-4 Units SubCUTAneous TID WC    insulin lispro  0-4 Units SubCUTAneous Nightly    insulin lispro  0-4 Units SubCUTAneous Q4H    sodium chloride flush  5-40 mL IntraVENous 2 times per day    enoxaparin  40 mg SubCUTAneous Daily        Infusions:    dextrose      sodium chloride         PRN Meds:   glucose, 4 tablet, PRN  dextrose bolus, 125 mL, PRN   Or  dextrose bolus, 250 mL, PRN  glucagon (rDNA), 1 mg, PRN  dextrose, , Continuous PRN  sodium chloride flush, 5-40 mL, PRN  sodium chloride, , PRN  potassium chloride, 40 mEq, PRN   Or  potassium alternative oral replacement, 40 mEq, PRN   Or  potassium chloride, 10 mEq, PRN  potassium chloride, 10 mEq, PRN  magnesium sulfate, 2,000 mg, PRN  ondansetron, 4 mg, Q8H PRN   Or  ondansetron, 4 mg, Q6H PRN  polyethylene glycol, 17 g, Daily PRN  acetaminophen, 650 mg, Q6H PRN   Or  acetaminophen, 650 mg, Q6H PRN        Data:     CBC:   Recent Labs     03/03/24  2152   WBC 15.8*   HGB 12.0*

## 2024-03-05 ENCOUNTER — APPOINTMENT (OUTPATIENT)
Dept: GENERAL RADIOLOGY | Age: 69
DRG: 242 | End: 2024-03-05
Attending: STUDENT IN AN ORGANIZED HEALTH CARE EDUCATION/TRAINING PROGRAM
Payer: COMMERCIAL

## 2024-03-05 ENCOUNTER — APPOINTMENT (OUTPATIENT)
Dept: ULTRASOUND IMAGING | Age: 69
DRG: 242 | End: 2024-03-05
Attending: STUDENT IN AN ORGANIZED HEALTH CARE EDUCATION/TRAINING PROGRAM
Payer: COMMERCIAL

## 2024-03-05 LAB
ALBUMIN SERPL-MCNC: 3.5 GM/DL (ref 3.4–5)
ALP BLD-CCNC: 66 IU/L (ref 40–128)
ALT SERPL-CCNC: 6 U/L (ref 10–40)
ANION GAP SERPL CALCULATED.3IONS-SCNC: 11 MMOL/L (ref 7–16)
AST SERPL-CCNC: 11 IU/L (ref 15–37)
BASOPHILS ABSOLUTE: 0.1 K/CU MM
BASOPHILS RELATIVE PERCENT: 0.6 % (ref 0–1)
BILIRUB SERPL-MCNC: 0.5 MG/DL (ref 0–1)
BUN SERPL-MCNC: 14 MG/DL (ref 6–23)
CALCIUM SERPL-MCNC: 8.5 MG/DL (ref 8.3–10.6)
CHLORIDE BLD-SCNC: 100 MMOL/L (ref 99–110)
CO2: 27 MMOL/L (ref 21–32)
CREAT SERPL-MCNC: 0.8 MG/DL (ref 0.6–1.1)
DIFFERENTIAL TYPE: ABNORMAL
EOSINOPHILS ABSOLUTE: 0.2 K/CU MM
EOSINOPHILS RELATIVE PERCENT: 1.6 % (ref 0–3)
GFR SERPL CREATININE-BSD FRML MDRD: >60 ML/MIN/1.73M2
GLUCOSE BLD-MCNC: 163 MG/DL (ref 70–99)
GLUCOSE BLD-MCNC: 167 MG/DL (ref 70–99)
GLUCOSE BLD-MCNC: 196 MG/DL (ref 70–99)
GLUCOSE BLD-MCNC: 229 MG/DL (ref 70–99)
GLUCOSE BLD-MCNC: 260 MG/DL (ref 70–99)
GLUCOSE BLD-MCNC: 299 MG/DL (ref 70–99)
GLUCOSE SERPL-MCNC: 164 MG/DL (ref 70–99)
HCT VFR BLD CALC: 34.3 % (ref 37–47)
HEMOGLOBIN: 10.5 GM/DL (ref 12.5–16)
IMMATURE NEUTROPHIL %: 0.4 % (ref 0–0.43)
LYMPHOCYTES ABSOLUTE: 1.8 K/CU MM
LYMPHOCYTES RELATIVE PERCENT: 17.1 % (ref 24–44)
MAGNESIUM: 2.1 MG/DL (ref 1.8–2.4)
MCH RBC QN AUTO: 27.5 PG (ref 27–31)
MCHC RBC AUTO-ENTMCNC: 30.6 % (ref 32–36)
MCV RBC AUTO: 89.8 FL (ref 78–100)
MONOCYTES ABSOLUTE: 0.8 K/CU MM
MONOCYTES RELATIVE PERCENT: 7.9 % (ref 0–4)
NUCLEATED RBC %: 0 %
PDW BLD-RTO: 14.8 % (ref 11.7–14.9)
PLATELET # BLD: 320 K/CU MM (ref 140–440)
PMV BLD AUTO: 10.5 FL (ref 7.5–11.1)
POTASSIUM SERPL-SCNC: 3.5 MMOL/L (ref 3.5–5.1)
RBC # BLD: 3.82 M/CU MM (ref 4.2–5.4)
SEGMENTED NEUTROPHILS ABSOLUTE COUNT: 7.4 K/CU MM
SEGMENTED NEUTROPHILS RELATIVE PERCENT: 72.4 % (ref 36–66)
SODIUM BLD-SCNC: 138 MMOL/L (ref 135–145)
TOTAL IMMATURE NEUTOROPHIL: 0.04 K/CU MM
TOTAL NUCLEATED RBC: 0 K/CU MM
TOTAL PROTEIN: 6.1 GM/DL (ref 6.4–8.2)
WBC # BLD: 10.3 K/CU MM (ref 4–10.5)

## 2024-03-05 PROCEDURE — 6360000002 HC RX W HCPCS: Performed by: NURSE PRACTITIONER

## 2024-03-05 PROCEDURE — 6360000002 HC RX W HCPCS: Performed by: STUDENT IN AN ORGANIZED HEALTH CARE EDUCATION/TRAINING PROGRAM

## 2024-03-05 PROCEDURE — 2580000003 HC RX 258: Performed by: NURSE PRACTITIONER

## 2024-03-05 PROCEDURE — 85025 COMPLETE CBC W/AUTO DIFF WBC: CPT

## 2024-03-05 PROCEDURE — 80053 COMPREHEN METABOLIC PANEL: CPT

## 2024-03-05 PROCEDURE — APPNB15 APP NON BILLABLE TIME 0-15 MINS

## 2024-03-05 PROCEDURE — 94761 N-INVAS EAR/PLS OXIMETRY MLT: CPT

## 2024-03-05 PROCEDURE — 74018 RADEX ABDOMEN 1 VIEW: CPT

## 2024-03-05 PROCEDURE — 2580000003 HC RX 258: Performed by: STUDENT IN AN ORGANIZED HEALTH CARE EDUCATION/TRAINING PROGRAM

## 2024-03-05 PROCEDURE — 6370000000 HC RX 637 (ALT 250 FOR IP): Performed by: STUDENT IN AN ORGANIZED HEALTH CARE EDUCATION/TRAINING PROGRAM

## 2024-03-05 PROCEDURE — 6370000000 HC RX 637 (ALT 250 FOR IP): Performed by: NURSE PRACTITIONER

## 2024-03-05 PROCEDURE — 83735 ASSAY OF MAGNESIUM: CPT

## 2024-03-05 PROCEDURE — 76775 US EXAM ABDO BACK WALL LIM: CPT

## 2024-03-05 PROCEDURE — 2140000000 HC CCU INTERMEDIATE R&B

## 2024-03-05 PROCEDURE — 6360000002 HC RX W HCPCS: Performed by: FAMILY MEDICINE

## 2024-03-05 PROCEDURE — 36415 COLL VENOUS BLD VENIPUNCTURE: CPT

## 2024-03-05 PROCEDURE — 99232 SBSQ HOSP IP/OBS MODERATE 35: CPT | Performed by: INTERNAL MEDICINE

## 2024-03-05 PROCEDURE — 82962 GLUCOSE BLOOD TEST: CPT

## 2024-03-05 RX ORDER — LABETALOL HYDROCHLORIDE 5 MG/ML
5 INJECTION, SOLUTION INTRAVENOUS ONCE
Status: COMPLETED | OUTPATIENT
Start: 2024-03-05 | End: 2024-03-05

## 2024-03-05 RX ORDER — LABETALOL HYDROCHLORIDE 5 MG/ML
10 INJECTION, SOLUTION INTRAVENOUS EVERY 4 HOURS PRN
Status: DISCONTINUED | OUTPATIENT
Start: 2024-03-05 | End: 2024-03-06 | Stop reason: HOSPADM

## 2024-03-05 RX ORDER — LOSARTAN POTASSIUM 25 MG/1
50 TABLET ORAL DAILY
Status: DISCONTINUED | OUTPATIENT
Start: 2024-03-05 | End: 2024-03-06 | Stop reason: HOSPADM

## 2024-03-05 RX ORDER — HYDRALAZINE HYDROCHLORIDE 20 MG/ML
10 INJECTION INTRAMUSCULAR; INTRAVENOUS EVERY 6 HOURS PRN
Status: DISCONTINUED | OUTPATIENT
Start: 2024-03-05 | End: 2024-03-06 | Stop reason: HOSPADM

## 2024-03-05 RX ORDER — METOPROLOL TARTRATE 50 MG/1
50 TABLET, FILM COATED ORAL 2 TIMES DAILY
Status: DISCONTINUED | OUTPATIENT
Start: 2024-03-05 | End: 2024-03-06 | Stop reason: HOSPADM

## 2024-03-05 RX ORDER — NIFEDIPINE 30 MG/1
30 TABLET, EXTENDED RELEASE ORAL DAILY
Status: DISCONTINUED | OUTPATIENT
Start: 2024-03-05 | End: 2024-03-06 | Stop reason: HOSPADM

## 2024-03-05 RX ADMIN — LABETALOL HYDROCHLORIDE 5 MG: 5 INJECTION, SOLUTION INTRAVENOUS at 00:47

## 2024-03-05 RX ADMIN — SODIUM CHLORIDE, PRESERVATIVE FREE 10 ML: 5 INJECTION INTRAVENOUS at 08:41

## 2024-03-05 RX ADMIN — NIFEDIPINE 30 MG: 30 TABLET, FILM COATED, EXTENDED RELEASE ORAL at 11:00

## 2024-03-05 RX ADMIN — INSULIN LISPRO 2 UNITS: 100 INJECTION, SOLUTION INTRAVENOUS; SUBCUTANEOUS at 12:26

## 2024-03-05 RX ADMIN — CEFAZOLIN 2000 MG: 2 INJECTION, POWDER, FOR SOLUTION INTRAMUSCULAR; INTRAVENOUS at 01:40

## 2024-03-05 RX ADMIN — METOPROLOL TARTRATE 50 MG: 50 TABLET, FILM COATED ORAL at 21:04

## 2024-03-05 RX ADMIN — ACETAMINOPHEN 650 MG: 325 TABLET ORAL at 12:26

## 2024-03-05 RX ADMIN — LOSARTAN POTASSIUM 50 MG: 25 TABLET, FILM COATED ORAL at 08:40

## 2024-03-05 RX ADMIN — INSULIN LISPRO 1 UNITS: 100 INJECTION, SOLUTION INTRAVENOUS; SUBCUTANEOUS at 16:26

## 2024-03-05 RX ADMIN — ENOXAPARIN SODIUM 40 MG: 100 INJECTION SUBCUTANEOUS at 08:40

## 2024-03-05 RX ADMIN — ACETAMINOPHEN 650 MG: 325 TABLET ORAL at 21:04

## 2024-03-05 RX ADMIN — SODIUM CHLORIDE, PRESERVATIVE FREE 10 ML: 5 INJECTION INTRAVENOUS at 21:05

## 2024-03-05 ASSESSMENT — PAIN SCALES - GENERAL
PAINLEVEL_OUTOF10: 3
PAINLEVEL_OUTOF10: 3

## 2024-03-05 ASSESSMENT — PAIN DESCRIPTION - ORIENTATION: ORIENTATION: MID

## 2024-03-05 ASSESSMENT — PAIN DESCRIPTION - LOCATION: LOCATION: BACK

## 2024-03-05 ASSESSMENT — PAIN DESCRIPTION - DESCRIPTORS: DESCRIPTORS: ACHING

## 2024-03-05 ASSESSMENT — PAIN - FUNCTIONAL ASSESSMENT: PAIN_FUNCTIONAL_ASSESSMENT: PREVENTS OR INTERFERES SOME ACTIVE ACTIVITIES AND ADLS

## 2024-03-05 NOTE — CONSULTS
Electrophysiology Consult Note      Reason for consultation:  Heart block    Chief complaint : Confusion, HTN    Referring physician:       Primary care physician: Heriberto Goodman DO      History of Present Illness:     Laurence Edwards is a 68 year old female with a history of diabetes type 2 and right femur fracture.   She presents with complaints confusion, fatigue and hypertension.   He reports that she began to have these symptoms in the evening time around 7 pm. She states she \"just didn't fell right.\"  She called EMS and she was found to be hypertensive.  This morning she is alert and oriented. She reports compliancy with taking medications.   She denies chest pain,  palpitations, shortness of breath, edema, dizziness, or syncope.    EKG: high degree AV block noted  Labs on admission , K 3.6, Creatinine 1.1, Mag 1.6, Lactic Acid 2.2, ProBNP 5307, WBC 15.8, Hgb 12.0,       Pastmedical history:   Past Medical History:   Diagnosis Date    Arthritis     Diabetes mellitus (HCC)     Hypertension        Surgical history :   Past Surgical History:   Procedure Laterality Date    CHOLECYSTECTOMY  1998    FEMUR FRACTURE SURGERY Right 09/23/2020    RIGHT FEMUR IM NAIL AMAYA INSERTION performed by Uche Reinoso MD at [facility] OR    PACEMAKER INSERTION Left 03/04/2024    Dual chamber PPM    TONSILLECTOMY         Family history:   Family History   Problem Relation Age of Onset    Diabetes Mother     Diabetes Sister        Social history :  reports that she quit smoking about 48 years ago. She has never used smokeless tobacco. She reports current alcohol use. She reports that she does not use drugs.    Allergies   Allergen Reactions    Dristan Spray [Nasal Spray] Hives    Grapeseed Extract [Nutritional Supplements] Hives     Grapes and raisins as well    Entex La Rash       No current facility-administered medications on file prior to encounter.     Current Outpatient 
Chart reviewed full note to follow                      Name:  Laurence Edwards /Age/Sex: 1955  (68 y.o. female)   MRN & CSN:  [id number] & [id number] Admission Date/Time: 3/4/2024  3:06 AM   Location:  3128/3128-A PCP: Heriberto Goodman DO       Hospital Day: 1          Referring physician:  Dieudonne Allen MD         Reason for consultation: High degree AV block        Thanks for referral.    Information source: Patient    CC; hypertension      HPI:   Thank you for involving me in taking  care of Laurence Edwards who  is a 68 y.o.year  Old female  Presents with history of hypertension, diabetes admitted with confusion symptoms started yesterday was having difficulty communicating      Now the blood pressure is better she is feeling better as well                 Past medical history:    has a past medical history of Arthritis, Diabetes mellitus (HCC), and Hypertension.  Past surgical history:   has a past surgical history that includes Cholecystectomy (); Femur fracture surgery (Right, 2020); and Tonsillectomy.  Social History:   reports that she quit smoking about 48 years ago. She has never used smokeless tobacco. She reports current alcohol use. She reports that she does not use drugs.  Family history:  family history includes Diabetes in her mother and sister.    Allergies   Allergen Reactions    Dristan Spray [Nasal Spray] Hives    Grapeseed Extract [Nutritional Supplements] Hives     Grapes and raisins as well    Entex La Rash       insulin lispro (HUMALOG) injection vial 0-4 Units, TID WC  insulin lispro (HUMALOG) injection vial 0-4 Units, Nightly  insulin lispro (HUMALOG) injection vial 0-4 Units, Q4H  glucose chewable tablet 16 g, PRN  dextrose bolus 10% 125 mL, PRN   Or  dextrose bolus 10% 250 mL, PRN  glucagon injection 1 mg, PRN  dextrose 10 % infusion, Continuous PRN  sodium chloride flush 0.9 % injection 5-40 mL, 2 times per day  sodium chloride flush 0.9 % injection 5-40 mL, PRN  0.9 % sodium 
noncontrast CT of the head. Electronically signed by Kathi Ludwig MD         Assessment & Plan:      Laurence Edwards is a 68 y.o. year old female admitted 3/4/2024 for right hydro    1) Difficult to ascertain if stone present b/c ct scan only with contrast - suspect stone is there - no pain for last 48 hours - will get CARLOTTA and KUB today and reassess      Patient seen and examined, chart reviewed.     Electronically signed by Padmini Gloria MD on 3/5/2024 at 12:22 PM

## 2024-03-05 NOTE — CARE COORDINATION
03/05/24 0908   Service Assessment   Patient Orientation Alert and Oriented   Cognition Alert   History Provided By Medical Record   Primary Caregiver Self   Support Systems Children   Patient's Healthcare Decision Maker is: Legal Next of Kin   PCP Verified by CM Yes   Prior Functional Level Independent in ADLs/IADLs   Current Functional Level Independent in ADLs/IADLs   Can patient return to prior living arrangement Yes   Ability to make needs known: Good   Family able to assist with home care needs: Yes   Would you like for me to discuss the discharge plan with any other family members/significant others, and if so, who? No   Financial Resources Medicare   Community Resources None     Chart reviewed, screened for discharge planning.  Pt from home. No discharge needs identified at this time.  CM following

## 2024-03-06 VITALS
DIASTOLIC BLOOD PRESSURE: 70 MMHG | HEIGHT: 66 IN | BODY MASS INDEX: 32.67 KG/M2 | OXYGEN SATURATION: 95 % | TEMPERATURE: 98.6 F | HEART RATE: 78 BPM | RESPIRATION RATE: 19 BRPM | WEIGHT: 203.26 LBS | SYSTOLIC BLOOD PRESSURE: 149 MMHG

## 2024-03-06 LAB
ALBUMIN SERPL-MCNC: 3.6 GM/DL (ref 3.4–5)
ALP BLD-CCNC: 68 IU/L (ref 40–128)
ALT SERPL-CCNC: <5 U/L (ref 10–40)
ANION GAP SERPL CALCULATED.3IONS-SCNC: 12 MMOL/L (ref 7–16)
AST SERPL-CCNC: 12 IU/L (ref 15–37)
BASOPHILS ABSOLUTE: 0.1 K/CU MM
BASOPHILS RELATIVE PERCENT: 0.7 % (ref 0–1)
BILIRUB SERPL-MCNC: 0.6 MG/DL (ref 0–1)
BUN SERPL-MCNC: 14 MG/DL (ref 6–23)
CALCIUM SERPL-MCNC: 8.8 MG/DL (ref 8.3–10.6)
CHLORIDE BLD-SCNC: 100 MMOL/L (ref 99–110)
CO2: 26 MMOL/L (ref 21–32)
CREAT SERPL-MCNC: 0.7 MG/DL (ref 0.6–1.1)
DIFFERENTIAL TYPE: ABNORMAL
EOSINOPHILS ABSOLUTE: 0.4 K/CU MM
EOSINOPHILS RELATIVE PERCENT: 3.5 % (ref 0–3)
GFR SERPL CREATININE-BSD FRML MDRD: >60 ML/MIN/1.73M2
GLUCOSE BLD-MCNC: 211 MG/DL (ref 70–99)
GLUCOSE BLD-MCNC: 218 MG/DL (ref 70–99)
GLUCOSE BLD-MCNC: 282 MG/DL (ref 70–99)
GLUCOSE SERPL-MCNC: 211 MG/DL (ref 70–99)
HCT VFR BLD CALC: 35.8 % (ref 37–47)
HEMOGLOBIN: 10.9 GM/DL (ref 12.5–16)
IMMATURE NEUTROPHIL %: 0.2 % (ref 0–0.43)
LYMPHOCYTES ABSOLUTE: 1.5 K/CU MM
LYMPHOCYTES RELATIVE PERCENT: 12.2 % (ref 24–44)
MAGNESIUM: 2 MG/DL (ref 1.8–2.4)
MCH RBC QN AUTO: 27.2 PG (ref 27–31)
MCHC RBC AUTO-ENTMCNC: 30.4 % (ref 32–36)
MCV RBC AUTO: 89.3 FL (ref 78–100)
MONOCYTES ABSOLUTE: 1 K/CU MM
MONOCYTES RELATIVE PERCENT: 8.5 % (ref 0–4)
NUCLEATED RBC %: 0 %
PDW BLD-RTO: 14.6 % (ref 11.7–14.9)
PLATELET # BLD: 317 K/CU MM (ref 140–440)
PMV BLD AUTO: 10 FL (ref 7.5–11.1)
POTASSIUM SERPL-SCNC: 3.5 MMOL/L (ref 3.5–5.1)
RBC # BLD: 4.01 M/CU MM (ref 4.2–5.4)
SEGMENTED NEUTROPHILS ABSOLUTE COUNT: 9.1 K/CU MM
SEGMENTED NEUTROPHILS RELATIVE PERCENT: 74.9 % (ref 36–66)
SODIUM BLD-SCNC: 138 MMOL/L (ref 135–145)
TOTAL IMMATURE NEUTOROPHIL: 0.03 K/CU MM
TOTAL NUCLEATED RBC: 0 K/CU MM
TOTAL PROTEIN: 6.3 GM/DL (ref 6.4–8.2)
WBC # BLD: 12.1 K/CU MM (ref 4–10.5)

## 2024-03-06 PROCEDURE — 82962 GLUCOSE BLOOD TEST: CPT

## 2024-03-06 PROCEDURE — 83735 ASSAY OF MAGNESIUM: CPT

## 2024-03-06 PROCEDURE — 80053 COMPREHEN METABOLIC PANEL: CPT

## 2024-03-06 PROCEDURE — 87077 CULTURE AEROBIC IDENTIFY: CPT

## 2024-03-06 PROCEDURE — 85025 COMPLETE CBC W/AUTO DIFF WBC: CPT

## 2024-03-06 PROCEDURE — 6370000000 HC RX 637 (ALT 250 FOR IP): Performed by: NURSE PRACTITIONER

## 2024-03-06 PROCEDURE — 6360000002 HC RX W HCPCS: Performed by: STUDENT IN AN ORGANIZED HEALTH CARE EDUCATION/TRAINING PROGRAM

## 2024-03-06 PROCEDURE — 2580000003 HC RX 258: Performed by: STUDENT IN AN ORGANIZED HEALTH CARE EDUCATION/TRAINING PROGRAM

## 2024-03-06 PROCEDURE — 87186 SC STD MICRODIL/AGAR DIL: CPT

## 2024-03-06 PROCEDURE — 6370000000 HC RX 637 (ALT 250 FOR IP): Performed by: STUDENT IN AN ORGANIZED HEALTH CARE EDUCATION/TRAINING PROGRAM

## 2024-03-06 PROCEDURE — 94761 N-INVAS EAR/PLS OXIMETRY MLT: CPT

## 2024-03-06 PROCEDURE — 87086 URINE CULTURE/COLONY COUNT: CPT

## 2024-03-06 PROCEDURE — 36415 COLL VENOUS BLD VENIPUNCTURE: CPT

## 2024-03-06 RX ORDER — METOPROLOL TARTRATE 50 MG/1
50 TABLET, FILM COATED ORAL 2 TIMES DAILY
Qty: 60 TABLET | Refills: 3 | Status: SHIPPED | OUTPATIENT
Start: 2024-03-06

## 2024-03-06 RX ORDER — NIFEDIPINE 30 MG/1
30 TABLET, EXTENDED RELEASE ORAL DAILY
Qty: 30 TABLET | Refills: 3 | Status: SHIPPED | OUTPATIENT
Start: 2024-03-07

## 2024-03-06 RX ADMIN — ACETAMINOPHEN 650 MG: 325 TABLET ORAL at 13:09

## 2024-03-06 RX ADMIN — INSULIN LISPRO 2 UNITS: 100 INJECTION, SOLUTION INTRAVENOUS; SUBCUTANEOUS at 12:47

## 2024-03-06 RX ADMIN — LOSARTAN POTASSIUM 50 MG: 25 TABLET, FILM COATED ORAL at 09:15

## 2024-03-06 RX ADMIN — NIFEDIPINE 30 MG: 30 TABLET, FILM COATED, EXTENDED RELEASE ORAL at 09:15

## 2024-03-06 RX ADMIN — SODIUM CHLORIDE, PRESERVATIVE FREE 10 ML: 5 INJECTION INTRAVENOUS at 09:15

## 2024-03-06 RX ADMIN — ENOXAPARIN SODIUM 40 MG: 100 INJECTION SUBCUTANEOUS at 09:14

## 2024-03-06 RX ADMIN — METOPROLOL TARTRATE 50 MG: 50 TABLET, FILM COATED ORAL at 09:15

## 2024-03-06 RX ADMIN — INSULIN LISPRO 1 UNITS: 100 INJECTION, SOLUTION INTRAVENOUS; SUBCUTANEOUS at 09:14

## 2024-03-06 ASSESSMENT — PAIN DESCRIPTION - DESCRIPTORS: DESCRIPTORS: ACHING

## 2024-03-06 ASSESSMENT — PAIN - FUNCTIONAL ASSESSMENT: PAIN_FUNCTIONAL_ASSESSMENT: ACTIVITIES ARE NOT PREVENTED

## 2024-03-06 ASSESSMENT — PAIN DESCRIPTION - LOCATION: LOCATION: BACK

## 2024-03-06 ASSESSMENT — PAIN SCALES - GENERAL: PAINLEVEL_OUTOF10: 3

## 2024-03-06 NOTE — DISCHARGE SUMMARY
pleural effusions. Infiltrates or pulmonary edema considered. PROCEDURE: CT ANGIOGRAPHY HEAD WITH/WITHOUT CONTRAST INDICATION: Confusion, hypertension COMPARISON: None. TECHNIQUE: Axial CT imaging obtained through the head prior to and following administration of IV contrast. Axial images, multiplanar reformatted images, and maximum intensity projection images were reviewed for CT angiographic technique. Up-to-date CT equipment and radiation dose reduction techniques are utilized. IV contrast: 75 mL Isovue-370. FINDINGS: ANTERIOR CIRCULATION: The intracranial internal carotid arteries, anterior cerebral arteries, and middle cerebral arteries demonstrate no occlusion or stenosis. No evidence for aneurysm or arteriovenous malformation. POSTERIOR CIRCULATION: The bilateral vertebral arteries, basilar artery and posterior cerebral arteries demonstrate no occlusion or stenosis. No evidence for aneurysm or arteriovenous malformation. INTRACRANIAL VENOUS SYSTEM: No evidence for intracranial venous thrombosis. INTRACRANIAL HEMORRHAGE: None. VENTRICLES: Normal in size and configuration for age. BRAIN PARENCHYMA: Gray-white matter differentiation is normal. No intracranial mass effect. No abnormal parenchymal enhancement seen. SKULL: No destructive osseous process or fracture. PARANASAL SINUSES / MASTOIDS: No acute sinusitis or mastoiditis. ORBITS: Normal. IMPRESSION: No evidence of major intracranial vascular stenosis or occlusion. No other acute intracranial findings. Electronically signed by Kathi Ludwig MD    CT HEAD WO CONTRAST    Result Date: 3/3/2024  CT OF THE HEAD WITHOUT CONTRAST: HISTORY: Hypotension, confusion. TECHNIQUE: Thin section axial images were obtained per routine protocol. No contrast given. Coronal and sagittal reformatting performed. Up-to-date CT equipment and radiation dose reduction techniques were utilized. COMPARISON: None. FINDINGS: The ventricular system is within normal limits. No extra-axial

## 2024-03-06 NOTE — PROGRESS NOTES
[address] [address]   Progress Note  [facility] 0 1 2      Date: 3/6/2024   Patient: Laurence Edwards   : 1955   DOA: 3/4/2024   MRN: [id number]   ROOM#: 3014/[id number]     Admit Date: 3/4/2024     Collaborating Urologist on Call at time of admission: Dr. Gloria  CC: Altered MS   Reason for Consult: Right hydro     Subjective:     Pain: mild, no nausea and no vomiting,   Bowel Movement/Flatus: Yes  Voiding: easily,     Pt resting in bed, reports feeling better today with no flank/abdominal pain. Denies f/c/n/v, gross hematuria, or other symptoms.    Objective:    Vitals:   BP (!) 154/72   Pulse 92   Temp 98.6 °F (37 °C) (Oral)   Resp 18   Ht 1.676 m (5' 6\")   Wt 92.2 kg (203 lb 4.2 oz)   SpO2 95%   BMI 32.81 kg/m²   Temp  Av.1 °F (36.7 °C)  Min: 97.8 °F (36.6 °C)  Max: 98.6 °F (37 °C)  No intake or output data in the 24 hours ending 24 0835    Physical Exam:   Gen: Pleasant female, in NAD  Neuro: Non-focal  Resp: Unlabored breathing  Abd: Soft, non-distended, non-tender to palpation, no rebound  Back:   No CVAT  Ext: No edema of bilateral LEs    Labs:  WBC:    Lab Results   Component Value Date/Time    WBC 12.1 2024 04:41 AM     Hemoglobin/Hematocrit:    Lab Results   Component Value Date/Time    HGB 10.9 2024 04:41 AM    HCT 35.8 2024 04:41 AM     BMP:   Lab Results   Component Value Date/Time     2024 04:41 AM    K 3.5 2024 04:41 AM     2024 04:41 AM    CO2 26 2024 04:41 AM    BUN 14 2024 04:41 AM    LABALBU 3.6 2024 04:41 AM    CREATININE 0.7 2024 04:41 AM    CALCIUM 8.8 2024 04:41 AM    GFRAA >60 10/13/2020 10:39 AM    LABGLOM >60 2024 04:41 AM     Imaging:  I personally reviewed images  XR ABDOMEN (KUB) (SINGLE AP VIEW)    Result Date: 3/5/2024  EXAM: XR ABDOMEN (KUB) (SINGLE AP VIEW). DATE: 3/5/2024 13:01 EST. INDICATION: possible right distal ureteral stone, hydronephrosis 
    V2.0    [facility] [place]      Name:  Laurence Edwards /Age/Sex: 1955  (68 y.o. female)   MRN & CSN:  [id number] & [id number] Encounter Date/Time: 3/5/2024 1:41 PM EST   Location:  [facility]/3128-A PCP: Heriberto Goodman DO     Attending:John Nichols MD       Hospital Day: 2    Assessment and Recommendations   Laurence Edwards is a 68 y.o. female  who presents with Hypertensive emergency      Expressive Aphasia  Encephalopathy   - Endorsed expressive aphasia and confusion. LKW 2024 19:00 EST.  - In ED, CTH and CTA H/N negative for acute changes or LVO. Initial NIH 0. Telestroke consulted, no thrombolytic therapy recommended   -Symptoms currently resolved  - Likely HTN encephalopathy  -Off Cardene drip  -Cannot complete MRI due to intramedullary oli  -TTE show EF 55 to 60%.     Hypertensive emergency  -BP on arrival was 239/69 consistent with wide pulse pressure which can be present in AR  -Initial Tn mildly elevated and plateaued. ECG showed mobitz type II with TWI   -CT chest negative for dissection  -Started on cardene gtt with improvement of BP.  Currently off  -CXR showed pulmonary edema s/p lasix   -Started on losartan 50 mg daily and nifedipine 30 mg daily     High degree AV block   -Hemodynamically stable and asymptomatic with HR ranging in low 50's  -EKG showed mobitz type II with TWI   -EP cardiology was consulted and patient underwent dual-chamber pacemaker placement 3/4/2024     Non-MI troponin elevation  - Denied any typical CP.  - Initial Tn mildly elevated and plateaued. ECG as above   - Likely secondary to HTN.      Leukocytosis   - WBC 15, no evidence of infection at this time, suspect to be reactive from HTN emergency      T2DM  - LCSI  - Hypoglycemic protocol     Right hydronephrosis  -Noted on CT abdomen and pelvis with possible 5 mm calculi  -Urology was consulted who recommended KUB and renal ultrasound both negative for stones  -Will follow-up for further recommendations         Diet 
  Physician Progress Note      PATIENT:               GEREMIAS CARPIO  [facility] #:                  [id number]  :                       1955  ADMIT DATE:       3/4/2024 3:06 AM  DISCH DATE:        3/6/2024 1:29 PM  RESPONDING  PROVIDER #:        John Nichols MD          QUERY TEXT:    Internal Medicine,    Patient admitted with chest pain.  If possible, please clarify in the progress   notes and discharge summary if you are evaluating and/or treating any of the   following:    The medical record reflects the following:  Risk Factors: heart block  Clinical Indicators: Cardiology documents: \"Elevated troponin probably type II   elevation.\"  Internal Medicine documents:\" Non-MI troponin elevation, denied any typical   CP. - Initial Tn mildly elevated and plateaued. ECG as above- Likely secondary   to HTN.\"  Treatment: labs, imaging, Cardiology consult, medical management    Thank you,  Maribell Alberto RN [facility]  [phone]  Options provided:  -- Type 2 MI  -- Demand Ischemia with MI  -- Demand Ischemia only, no MI  -- Other - I will add my own diagnosis  -- Disagree - Not applicable / Not valid  -- Disagree - Clinically unable to determine / Unknown  -- Refer to Clinical Documentation Reviewer    PROVIDER RESPONSE TEXT:    This patient has demand ischemia only, no MI.    Query created by: Maribell Alberto on 3/6/2024 1:30 PM      QUERY TEXT:    Internal Medicine,    Pt admitted with HTN emergency, complete heart block and is s/p PPM insertion.   Noted documentation of pulmonary edema for which patient received IV lasix.   If possible, please document in the progress notes and discharge summary if   you are evaluating and/or treating any of the following:    The medical record reflects the following:  Risk Factors: Heart bloc, HTN emergency  Clinical Indicators: Per Internal Medicine documentation, CXR showed pulmonary   edema s/p lasix. CXR report suggest possible CHF, BNP and troponin elevation  Treatment: labs, imaging, ECHO, 
4 Eyes Skin Assessment     NAME:  Laurence Edwards  YOB: 1955  MEDICAL RECORD NUMBER:  [id number]    The patient is being assessed for  Admission    I agree that at least one RN has performed a thorough Head to Toe Skin Assessment on the patient. ALL assessment sites listed below have been assessed.      Areas assessed by both nurses:    Head, Face, Ears, Shoulders, Back, Chest, Arms, Elbows, Hands, Sacrum. Buttock, Coccyx, Ischium, Legs. Feet and Heels, and Under Medical Devices         Does the Patient have a Wound? No noted wound(s)       Bishnu Prevention initiated by RN: No  Wound Care Orders initiated by RN: No    Pressure Injury (Stage 3,4, Unstageable, DTI, NWPT, and Complex wounds) if present, place Wound referral order by RN under : No    New Ostomies, if present place, Ostomy referral order under : No     Nurse 1 eSignature: Electronically signed by Carolyn Marshall RN on 3/4/24 at 3:18 AM EST    **SHARE this note so that the co-signing nurse can place an eSignature**    Nurse 2 eSignature: Electronically signed by Erma Dejesus RN on 3/4/24 at 4:40 AM EST    
Outpatient Pharmacy Progress Note for Meds-to-Beds    Total number of Prescriptions Filled: 2  The following medications were dispensed to the patient during the discharge process:  metoprolol tartrate  NIFEdipine    Additional Documentation:  Patient picked-up the medication(s) in the OP Pharmacy      Thank you for letting us serve your patients.  [address] [id number]    Phone: [phone]    Fax: [phone]        
Patient left upper chest site no hematoma, no tenderness. Minimal bruise  CXR  - no acute abnormatities  Device interrogation - with in limits    Can be discharged with an outpatient follow up in 10 days.      1. Avoid raising the arm above shoulder for 4 weeks  2. No driving for 10 days  3. No lifting more than 10 lbs with the left hand  4. Do not wet the area of surgery for 10 days  5. Follow up appointment with Doctor for wound check in 10 days  6. Notify Doctor if pain, fever, chills, swelling or bleeding in the surgical area  7. Please avoid sleeping on the surgical side.   8. Please do not allow anyone other than Dr Martínez's staff to remove or redress the surgical site. If the dressing were to fall off or fall partially off before the 10 day follow up appointment, please call the office ASAP.     Patient remains hypertensive  Will restart losartan to 50 mg BID- home medication    Addendum  Will add metoprolol 50 mg BID as patient is tachycardic  
Patient seen and examined at bedside this morning.  Admitted earlier today by Dr. Allen for hypertensive emergency with hypertensive encephalopathy.  Patient was also noted to have complete heart block and EP cardiology was consulted and patient underwent pacemaker placement.  Continue current management plan and following up on further workup  
Summary (Last 24 hours) at 3/5/2024 1314  Last data filed at 3/4/2024 1519  Gross per 24 hour   Intake --   Output 500 ml   Net -500 ml         BP (!) 186/82   Pulse 98   Temp 98.1 °F (36.7 °C) (Oral)   Resp 26   Ht 1.676 m (5' 6\")   Wt 92.5 kg (203 lb 14.8 oz)   SpO2 95%   BMI 32.91 kg/m²       Physical Exam:  General:  Awake, alert, NAD  Head:normal  Eye:normal  Neck:  No JVD   Chest:  Clear to auscultation, respiration easy  Cardiovascular:  RRR S1S2  Abdomen:   nontender  Extremities:  TR edema  Pulses; palpable  Neuro: grossly normal      MEDICAL DECISION MAKING;    Pt assessed , chart reviewed, patient examined examined , all available data was reviewed, following is the plan which was discussed with SANCHEZ as well:    -Hypertensive urgency will maintain the blood pressure on medical therapy  -Diabetes on medical therapy  -Patient was in complete heart block underwent dual-chamber pacemaker yesterday per EP  -Elevated troponin probably type II elevation  -Possible hypertensive encephalopathy  -BNP level is elevated and rest suggestive of congestive heart failure          GALINA SANTANA MD [facility]

## 2024-03-07 RX ORDER — CEFDINIR 300 MG/1
300 CAPSULE ORAL 2 TIMES DAILY
Qty: 10 CAPSULE | Refills: 0 | Status: SHIPPED | OUTPATIENT
Start: 2024-03-07 | End: 2024-03-08 | Stop reason: HOSPADM

## 2024-03-08 LAB
CULTURE: ABNORMAL
CULTURE: ABNORMAL
Lab: ABNORMAL
SPECIMEN: ABNORMAL

## 2024-03-08 RX ORDER — SULFAMETHOXAZOLE AND TRIMETHOPRIM 800; 160 MG/1; MG/1
1 TABLET ORAL 2 TIMES DAILY
Qty: 20 TABLET | Refills: 0 | Status: SHIPPED | OUTPATIENT
Start: 2024-03-08 | End: 2024-03-08 | Stop reason: HOSPADM

## 2024-03-09 LAB
CULTURE: NORMAL
CULTURE: NORMAL
Lab: NORMAL
Lab: NORMAL
SPECIMEN: NORMAL
SPECIMEN: NORMAL

## 2024-03-14 ENCOUNTER — NURSE ONLY (OUTPATIENT)
Dept: CARDIOLOGY CLINIC | Age: 69
End: 2024-03-14

## 2024-03-14 VITALS — TEMPERATURE: 98.1 F

## 2024-03-14 DIAGNOSIS — Z95.0 STATUS POST PLACEMENT OF CARDIAC PACEMAKER: Primary | ICD-10-CM

## 2024-03-14 PROCEDURE — 99024 POSTOP FOLLOW-UP VISIT: CPT | Performed by: INTERNAL MEDICINE

## 2024-03-14 NOTE — PROGRESS NOTES
Patient seen for site check post pacer implant. Dressing removed. No signs of inflammation or infection noted.   Edges well approximated. Patient has no complaints of pain or discomfort. Single steri strip applied. Patient instructed to not lift arm higher than shoulder level. Instructions given on the use of [email or web address] phone April for home device checks.

## 2024-04-03 ENCOUNTER — OFFICE VISIT (OUTPATIENT)
Dept: CARDIOLOGY CLINIC | Age: 69
End: 2024-04-03

## 2024-04-03 VITALS
SYSTOLIC BLOOD PRESSURE: 128 MMHG | DIASTOLIC BLOOD PRESSURE: 80 MMHG | WEIGHT: 200 LBS | HEIGHT: 67 IN | HEART RATE: 80 BPM | BODY MASS INDEX: 31.39 KG/M2

## 2024-04-03 DIAGNOSIS — Z95.0 PACEMAKER: Primary | ICD-10-CM

## 2024-04-03 PROCEDURE — 99024 POSTOP FOLLOW-UP VISIT: CPT | Performed by: NURSE PRACTITIONER

## 2024-04-03 RX ORDER — LOSARTAN POTASSIUM 100 MG/1
100 TABLET ORAL DAILY
COMMUNITY

## 2024-04-03 NOTE — PROGRESS NOTES
Patient is here today for follow-up on pacemaker  Patient reports she is feeling well.  She denies chest pain, palpitations, shortness of breath, lightheadedness, dizziness, edema or syncope  Left upper chest site well-approximated.  No redness no swelling no hematoma  Device interrogated  Device Assessment:      The device is [employer]tronic pacemaker - Dual Chamber chamber      MRI Compatible : yes    Device interrogation was performed.    Mode: DDD     Sensing is normal. Impedence is normal.  Threshold is normal.     There has not been interval changes.     Estimated battery life is 11.4 years     The underlying rhythm is AS,.  0.5 % atrial paced; 99.3 % ventricular paced.      Atrial Arrhythmia : No    Non sustained VT episodes : No    Sustained VT episodes : No    Patient activity reported 0.2 hrs/day    The patient is pacemaker dependent.      Device is functioning appropriately at this time patient has no limitations  Patient to follow-up and 3 months or sooner if needed

## 2024-07-06 PROCEDURE — 93296 REM INTERROG EVL PM/IDS: CPT | Performed by: INTERNAL MEDICINE

## 2024-07-06 PROCEDURE — 93294 REM INTERROG EVL PM/LDLS PM: CPT | Performed by: INTERNAL MEDICINE

## 2024-07-09 ENCOUNTER — PROCEDURE VISIT (OUTPATIENT)
Dept: CARDIOLOGY CLINIC | Age: 69
End: 2024-07-09
Payer: COMMERCIAL

## 2024-07-09 DIAGNOSIS — Z95.0 PACEMAKER: Primary | ICD-10-CM

## 2024-07-24 ENCOUNTER — OFFICE VISIT (OUTPATIENT)
Dept: CARDIOLOGY CLINIC | Age: 69
End: 2024-07-24
Payer: COMMERCIAL

## 2024-07-24 VITALS
HEART RATE: 80 BPM | SYSTOLIC BLOOD PRESSURE: 130 MMHG | BODY MASS INDEX: 32.18 KG/M2 | HEIGHT: 67 IN | DIASTOLIC BLOOD PRESSURE: 70 MMHG | WEIGHT: 205 LBS

## 2024-07-24 DIAGNOSIS — I44.2 COMPLETE HEART BLOCK (HCC): Primary | ICD-10-CM

## 2024-07-24 DIAGNOSIS — Z95.0 PACEMAKER: ICD-10-CM

## 2024-07-24 DIAGNOSIS — I10 PRIMARY HYPERTENSION: ICD-10-CM

## 2024-07-24 PROCEDURE — 93000 ELECTROCARDIOGRAM COMPLETE: CPT | Performed by: NURSE PRACTITIONER

## 2024-07-24 PROCEDURE — 99214 OFFICE O/P EST MOD 30 MIN: CPT | Performed by: NURSE PRACTITIONER

## 2024-07-24 PROCEDURE — 1123F ACP DISCUSS/DSCN MKR DOCD: CPT | Performed by: NURSE PRACTITIONER

## 2024-07-24 PROCEDURE — 3078F DIAST BP <80 MM HG: CPT | Performed by: NURSE PRACTITIONER

## 2024-07-24 PROCEDURE — 3075F SYST BP GE 130 - 139MM HG: CPT | Performed by: NURSE PRACTITIONER

## 2024-07-24 ASSESSMENT — ENCOUNTER SYMPTOMS
SINUS PAIN: 0
ABDOMINAL DISTENTION: 0
SHORTNESS OF BREATH: 0
PHOTOPHOBIA: 0
SINUS PRESSURE: 0
BACK PAIN: 0
COUGH: 0
ABDOMINAL PAIN: 0
COLOR CHANGE: 0

## 2024-07-24 NOTE — PROGRESS NOTES
Electrophysiology Follow up Note      Reason for consultation:  Heart block    Chief complaint: follow up on pacemaker    Referring physician:       Primary care physician: Heriberto Goodman DO      History of Present Illness:     This visit 7/24/2024  Patient is here today for follow-up on complete heart block and pacemaker.  Patient reports she is feeling well.  She denies chest pain, palpitations, shortness of breath, lightheadedness, dizziness, edema or syncope    Previous visit  Laurence Edwards is a 68 year old female with a history of diabetes type 2 and right femur fracture.   She presents with complaints confusion, fatigue and hypertension.   He reports that she began to have these symptoms in the evening time around 7 pm. She states she \"just didn't fell right.\"  She called EMS and she was found to be hypertensive.  This morning she is alert and oriented. She reports compliancy with taking medications.   She denies chest pain,  palpitations, shortness of breath, edema, dizziness, or syncope.    EKG: high degree AV block noted  Labs on admission , K 3.6, Creatinine 1.1, Mag 1.6, Lactic Acid 2.2, ProBNP 5307, WBC 15.8, Hgb 12.0,       Pastmedical history:   Past Medical History:   Diagnosis Date    Arthritis     Diabetes mellitus (HCC)     Hypertension        Surgical history :   Past Surgical History:   Procedure Laterality Date    CHOLECYSTECTOMY  1998    EP DEVICE PROCEDURE N/A 3/4/2024    Insert PPM dual performed by Bret Martínez MD at [facility] CARDIAC CATH LAB    FEMUR FRACTURE SURGERY Right 09/23/2020    RIGHT FEMUR IM NAIL AMAYA INSERTION performed by Uche Reinoso MD at [facility] OR    PACEMAKER INSERTION Left 03/04/2024    Dual chamber PPM    TONSILLECTOMY         Family history:   Family History   Problem Relation Age of Onset    Diabetes Mother     Diabetes Sister        Social history :  reports that she quit smoking about 48 years ago. She

## 2024-10-15 PROCEDURE — 93294 REM INTERROG EVL PM/LDLS PM: CPT | Performed by: INTERNAL MEDICINE

## 2024-10-15 PROCEDURE — 93296 REM INTERROG EVL PM/IDS: CPT | Performed by: INTERNAL MEDICINE

## 2025-01-23 PROCEDURE — 93294 REM INTERROG EVL PM/LDLS PM: CPT | Performed by: INTERNAL MEDICINE

## 2025-01-23 PROCEDURE — 93296 REM INTERROG EVL PM/IDS: CPT | Performed by: INTERNAL MEDICINE

## 2025-04-30 ENCOUNTER — OFFICE VISIT (OUTPATIENT)
Age: 70
End: 2025-04-30

## 2025-04-30 VITALS
SYSTOLIC BLOOD PRESSURE: 140 MMHG | WEIGHT: 207 LBS | HEIGHT: 67 IN | BODY MASS INDEX: 32.49 KG/M2 | DIASTOLIC BLOOD PRESSURE: 90 MMHG | HEART RATE: 70 BPM

## 2025-04-30 DIAGNOSIS — I10 PRIMARY HYPERTENSION: ICD-10-CM

## 2025-04-30 DIAGNOSIS — Z95.0 PACEMAKER: ICD-10-CM

## 2025-04-30 DIAGNOSIS — I47.29 NSVT (NONSUSTAINED VENTRICULAR TACHYCARDIA) (HCC): Primary | ICD-10-CM

## 2025-04-30 RX ORDER — METOPROLOL TARTRATE 75 MG/1
50 TABLET ORAL 2 TIMES DAILY
Qty: 180 TABLET | Refills: 1 | Status: SHIPPED | OUTPATIENT
Start: 2025-04-30

## 2025-04-30 RX ORDER — MAGNESIUM OXIDE 400 MG/1
400 TABLET ORAL DAILY
Qty: 30 TABLET | Refills: 1 | Status: SHIPPED | OUTPATIENT
Start: 2025-04-30

## 2025-04-30 ASSESSMENT — ENCOUNTER SYMPTOMS
BACK PAIN: 0
COUGH: 0
SHORTNESS OF BREATH: 0
PHOTOPHOBIA: 0
SINUS PAIN: 0
ABDOMINAL PAIN: 0
COLOR CHANGE: 0
SINUS PRESSURE: 0
ABDOMINAL DISTENTION: 0

## 2025-06-02 PROCEDURE — 93296 REM INTERROG EVL PM/IDS: CPT | Performed by: INTERNAL MEDICINE

## 2025-06-02 PROCEDURE — 93294 REM INTERROG EVL PM/LDLS PM: CPT | Performed by: INTERNAL MEDICINE

## (undated) DEVICE — SOLUTION IV 1000ML 0.9% SOD CHL FOR IRRIG PLAS CONT

## (undated) DEVICE — DRESSING,GAUZE,XEROFORM,CURAD,5"X9",ST: Brand: CURAD

## (undated) DEVICE — MAT FLOOR ULTRA ABS 28X48IN

## (undated) DEVICE — YANKAUER,FLEXIBLE HANDLE,REGLR CAPACITY: Brand: MEDLINE INDUSTRIES, INC.

## (undated) DEVICE — Device

## (undated) DEVICE — TOWEL,OR,DSP,ST,BLUE,STD,6/PK,12PK/CS: Brand: MEDLINE

## (undated) DEVICE — COVER,C-ARM,41X74: Brand: MEDLINE

## (undated) DEVICE — INTRODUCER SHTH 7FR L13CM NDL 18GA GWIRE 0.035IN SYR VLV

## (undated) DEVICE — SURGICAL PROCEDURE PACK CARD IMPL LOOP KT CUST LTX

## (undated) DEVICE — SUTURE ABSORBABLE BRAIDED 2-0 CT-1 27 IN UD VICRYL J259H

## (undated) DEVICE — COUNTER NDL 30 COUNT FOAM STRP SGL MAG

## (undated) DEVICE — DRESSING TRNSPAR W4XL10IN FLM MIC POR SURESITE 123

## (undated) DEVICE — PENCIL ES CRD L10FT HND SWCHING ROCK SWCH W/ EDGE COAT BLDE

## (undated) DEVICE — BANDAGE,SELF ADHRNT,COFLEX,4"X5YD,STRL: Brand: COLABEL

## (undated) DEVICE — APPLICATOR MEDICATED 26 CC SOLUTION HI LT ORNG CHLORAPREP

## (undated) DEVICE — FAN SPRAY KIT: Brand: PULSAVAC®

## (undated) DEVICE — BANDAGE,GAUZE,BULKEE II,4.5"X4.1YD,STRL: Brand: MEDLINE

## (undated) DEVICE — GUIDEWIRE ORTH L400MM DIA3.2MM FOR TFN

## (undated) DEVICE — GLOVE ORANGE PI 8   MSG9080

## (undated) DEVICE — SUTURE VCRL SZ 0 L36IN ABSRB VLT L36MM CT-1 1/2 CIR J346H

## (undated) DEVICE — Z DISCONTINUED USE 2744636  DRESSING AQUACEL 14 IN ALG W3.5XL14IN POLYUR FLM CVR W/ HYDRCOLL

## (undated) DEVICE — ELECTRODE ES AD CRDLSS PT RET REM POLYHESIVE

## (undated) DEVICE — GLOVE SURG SZ 7 CRM LTX FREE POLYISOPRENE POLYMER BEAD ANTI

## (undated) DEVICE — ANGIOGRAPHY KIT CUST MANIFOLD

## (undated) DEVICE — TUBING, SUCTION, 9/32" X 10', STRAIGHT: Brand: MEDLINE

## (undated) DEVICE — SOLUTION IV IRRIG POUR BRL 0.9% SODIUM CHL 2F7124

## (undated) DEVICE — SUTURE VCRL SZ 4-0 L18IN ABSRB UD L16MM PS-4 1/2 CIR PRIM J507G

## (undated) DEVICE — BIT DRL L145MM DIA4.2MM NONSTERILE 3 FLUT NDL PNT QUIK CPL

## (undated) DEVICE — INTENDED FOR TISSUE SEPARATION, AND OTHER PROCEDURES THAT REQUIRE A SHARP SURGICAL BLADE TO PUNCTURE OR CUT.: Brand: BARD-PARKER ® STAINLESS STEEL BLADES

## (undated) DEVICE — GOWN,SURGICAL,AURORA,SLEEVE: Brand: MEDLINE

## (undated) DEVICE — LINER,SEMI-RIGID,3000CC,50EA/CS: Brand: MEDLINE

## (undated) DEVICE — PACK,BASIC,IX: Brand: MEDLINE

## (undated) DEVICE — SPONGE GZ W4XL8IN COT WVN 12 PLY

## (undated) DEVICE — DRESSING TRNSPAR W5XL4.5IN FLM SHT SEMIPERMEABLE WIND

## (undated) DEVICE — ROD RMR L950MM DIA2.5MM W/ EXTN BALL TIP

## (undated) DEVICE — SPONGE LAP W18XL18IN WHT COT 4 PLY FLD STRUNG RADPQ DISP ST

## (undated) DEVICE — BIT DRL L500MM DIA6X9MM CANN STP L QUIK CPL FOR DH DC TFN

## (undated) DEVICE — LIQUIBAND RAPID ADHESIVE 36/CS 0.8ML: Brand: MEDLINE

## (undated) DEVICE — PERCUTANEOUS ENTRY THINWALL NEEDLE  ONE-PART: Brand: COOK

## (undated) DEVICE — 6617 IOBAN II PATIENT ISOLATION DRAPE 5/BX,4BX/CS: Brand: STERI-DRAPE™ IOBAN™ 2

## (undated) DEVICE — SUTURE ETHBND EXCEL SZ 0 L30IN NONABSORBABLE GRN CT1 L36MM X424H

## (undated) DEVICE — SUTURE VCRL SZ 2-0 L27IN ABSRB UD L26MM CT-2 1/2 CIR J269H

## (undated) DEVICE — DRESSING TRNSPAR W6XL8IN FLM SURESITE 123

## (undated) DEVICE — PAD,ABDOMINAL,5"X9",ST,LF,25/BX: Brand: MEDLINE INDUSTRIES, INC.

## (undated) DEVICE — SYRINGE IRRIG 60ML SFT PLIABLE BLB EZ TO GRP 1 HND USE W/

## (undated) DEVICE — DRAPE SHEET ULTRAGARD: Brand: MEDLINE